# Patient Record
Sex: FEMALE | Race: WHITE | NOT HISPANIC OR LATINO | Employment: OTHER | URBAN - NONMETROPOLITAN AREA
[De-identification: names, ages, dates, MRNs, and addresses within clinical notes are randomized per-mention and may not be internally consistent; named-entity substitution may affect disease eponyms.]

---

## 2017-01-20 ENCOUNTER — TELEPHONE (OUTPATIENT)
Dept: GASTROENTEROLOGY | Facility: CLINIC | Age: 27
End: 2017-01-20

## 2017-01-20 DIAGNOSIS — Z83.71 FAMILY HISTORY OF POLYPS IN THE COLON: Primary | ICD-10-CM

## 2017-01-20 NOTE — TELEPHONE ENCOUNTER
Pt records from referring physician reviewed  According to medical record Corrine Conte does not have a history of heart disease or lung disease.  According to medical record Corrine Conte is not currently on blood thinner.  According to medical record Corrine Conte is not currently exhibiting abdominal pain, weight loss, hematochezia, melena,  change in bowels, or other symptoms to indicate pt would need to be seen in office.    Will submit order for Corrine Conte to proceed with CScope    Medication will be verified as well as a lack of GI related symptomology when pt is scheduled for procedure.

## 2017-02-17 RX ORDER — LEVONORGESTREL AND ETHINYL ESTRADIOL 0.1-0.02MG
KIT ORAL
Refills: 2 | COMMUNITY
Start: 2017-01-16 | End: 2017-02-22

## 2017-02-22 ENCOUNTER — OFFICE VISIT (OUTPATIENT)
Dept: GASTROENTEROLOGY | Facility: CLINIC | Age: 27
End: 2017-02-22

## 2017-02-22 VITALS
OXYGEN SATURATION: 99 % | DIASTOLIC BLOOD PRESSURE: 56 MMHG | SYSTOLIC BLOOD PRESSURE: 104 MMHG | HEIGHT: 68 IN | TEMPERATURE: 97.4 F | HEART RATE: 76 BPM | BODY MASS INDEX: 23.55 KG/M2 | WEIGHT: 155.4 LBS

## 2017-02-22 DIAGNOSIS — Z83.71 FAMILY HISTORY OF COLONIC POLYPS: Primary | ICD-10-CM

## 2017-02-22 PROCEDURE — S0260 H&P FOR SURGERY: HCPCS | Performed by: NURSE PRACTITIONER

## 2017-02-22 RX ORDER — LEVONORGESTREL AND ETHINYL ESTRADIOL 0.1-0.02MG
1 KIT ORAL DAILY
COMMUNITY
End: 2019-07-23 | Stop reason: HOSPADM

## 2017-02-22 NOTE — PROGRESS NOTES
Chief Complaint   Patient presents with   • Constipation     Patient states she has had problems with constipation in the past.  Her mother has a hx of colon polyps and her PCP wanted her checked to see if she needs a colonoscopy before the age of 30.     Subjective   HPI    Corrine Conte is a 26 y.o. female who presents to office for preventative maintenance.  There is not  a personal history of colon polyps.  There is not a history of colon cancer.  She does not have complaints of nausea/vomiting, change in bowels, weight loss, no BRBPR, no melena.  There is not a family history of colon cancer.  There is a family history of colon polyps-mother age of 19.  Pt last colonoscopy-no previous colonoscopy .  Bowels do move on regular basis.    Past Medical History   Diagnosis Date   • Family history of polyps in the colon      Past Surgical History   Procedure Laterality Date   • Tonsillectomy     • Leep       Outpatient Prescriptions Marked as Taking for the 2/22/17 encounter (Office Visit) with NAYAN Cornell   Medication Sig Dispense Refill   • levonorgestrel-ethinyl estradiol (SRONYX) 0.1-20 MG-MCG per tablet Take 1 tablet by mouth Daily.       Allergies   Allergen Reactions   • Diphenhydramine    • Sulfa Antibiotics      Social History     Social History   • Marital status: Single     Spouse name: N/A   • Number of children: N/A   • Years of education: N/A     Occupational History   • Not on file.     Social History Main Topics   • Smoking status: Never Smoker   • Smokeless tobacco: Never Used   • Alcohol use Yes      Comment: OCC   • Drug use: No   • Sexual activity: Defer      Comment:      Other Topics Concern   • Not on file     Social History Narrative     Family History   Problem Relation Age of Onset   • Colon polyps Mother 19     MULTIPLE POLYPS   • Hypertension Father    • Diabetes Father      PRE-DIABETES   • Obesity Father    • Hypertension Brother    • Breast cancer Maternal  Grandmother      POST-MENOPAUSE   • Heart disease Maternal Grandfather    • Diabetes Paternal Grandfather    • Heart disease Paternal Grandfather 56     BYPASS AND VALVE REPLACEMENT   • Colon cancer Neg Hx    • Rectal cancer Neg Hx    • Esophageal cancer Neg Hx    • Liver cancer Neg Hx    • Liver disease Neg Hx    • Stomach cancer Neg Hx      Review of Systems   Constitutional: Negative for fatigue, fever and unexpected weight change.   HENT: Negative for hearing loss, sore throat and voice change.    Eyes: Negative for visual disturbance.   Respiratory: Negative for cough, shortness of breath and wheezing.    Cardiovascular: Negative for chest pain and palpitations.   Gastrointestinal: Negative for abdominal pain, blood in stool and vomiting.   Endocrine: Negative for polydipsia and polyuria.   Genitourinary: Negative for difficulty urinating, dysuria, hematuria and urgency.   Musculoskeletal: Negative for joint swelling and myalgias.   Skin: Negative for color change, rash and wound.   Neurological: Negative for dizziness, tremors, seizures and syncope.   Hematological: Does not bruise/bleed easily.   Psychiatric/Behavioral: Negative for agitation and confusion. The patient is not nervous/anxious.      Objective   Vitals:    02/22/17 0813   BP: 104/56   Pulse: 76   Temp: 97.4 °F (36.3 °C)   SpO2: 99%     Physical Exam   Constitutional: She is oriented to person, place, and time. She appears well-developed and well-nourished.   HENT:   Head: Normocephalic and atraumatic.   Eyes: Conjunctivae are normal. Pupils are equal, round, and reactive to light. No scleral icterus.   Neck: No JVD present. No thyroid mass and no thyromegaly present.   Cardiovascular: Normal rate, regular rhythm and normal heart sounds.  Exam reveals no gallop and no friction rub.    No murmur heard.  Pulmonary/Chest: Effort normal and breath sounds normal. No accessory muscle usage. No respiratory distress. She has no wheezes. She has no rales.    Abdominal: Soft. Bowel sounds are normal. She exhibits no distension, no ascites and no mass. There is no splenomegaly or hepatomegaly. There is no tenderness. There is no rebound and no guarding.   Genitourinary:   Genitourinary Comments: Rectal-Did not examine   Musculoskeletal: Normal range of motion. She exhibits no edema.   Neurological: She is alert and oriented to person, place, and time.   Deemed a reliable historian, able to converse without difficulty and able to move all extremities without difficulty   Skin: Skin is warm and dry.   Psychiatric: She has a normal mood and affect. Her behavior is normal.     Imaging Results (most recent)     None        Assessment/Plan   Corrine was seen today for constipation.    Diagnoses and all orders for this visit:    Family history of colonic polyps  -     Case request  -     Sod Picosulfate-Mag Ox-Cit Acd (PREPOPIK) 10-3.5-12 MG-GM-GM pack; Take as directed      COLONOSCOPY WITH ANESTHESIA (N/A)      All risks, benefits, alternatives, and indications of colonoscopy procedure have been discussed with the patient. Risks to include perforation of the colon requiring possible surgery or colostomy, risk of bleeding from biopsies or removal of colon tissue, possibility of missing a colon polyp or cancer, or adverse drug reaction.  Benefits to include the diagnosis and management of disease of the colon and rectum. Alternatives to include barium enema, radiographic evaluation, lab testing or no intervention. Pt verbalizes understanding and agrees.     Current guidelines indicate first degree relative should undergo CScope at age 40 or 10 yrs prior to age of diagnosis, whichever is first.    There are no Patient Instructions on file for this visit.

## 2017-03-13 ENCOUNTER — ANESTHESIA EVENT (OUTPATIENT)
Dept: GASTROENTEROLOGY | Facility: HOSPITAL | Age: 27
End: 2017-03-13

## 2017-03-14 ENCOUNTER — ANESTHESIA (OUTPATIENT)
Dept: GASTROENTEROLOGY | Facility: HOSPITAL | Age: 27
End: 2017-03-14

## 2017-03-14 ENCOUNTER — HOSPITAL ENCOUNTER (OUTPATIENT)
Facility: HOSPITAL | Age: 27
Setting detail: HOSPITAL OUTPATIENT SURGERY
Discharge: HOME | End: 2017-03-14
Attending: INTERNAL MEDICINE | Admitting: INTERNAL MEDICINE

## 2017-03-14 ENCOUNTER — TELEPHONE (OUTPATIENT)
Dept: GASTROENTEROLOGY | Facility: CLINIC | Age: 27
End: 2017-03-14

## 2017-03-14 VITALS
TEMPERATURE: 98.1 F | WEIGHT: 150 LBS | HEIGHT: 68 IN | SYSTOLIC BLOOD PRESSURE: 107 MMHG | HEART RATE: 63 BPM | OXYGEN SATURATION: 100 % | BODY MASS INDEX: 22.73 KG/M2 | DIASTOLIC BLOOD PRESSURE: 63 MMHG | RESPIRATION RATE: 14 BRPM

## 2017-03-14 LAB — B-HCG UR QL: NEGATIVE

## 2017-03-14 PROCEDURE — 45378 DIAGNOSTIC COLONOSCOPY: CPT | Performed by: INTERNAL MEDICINE

## 2017-03-14 PROCEDURE — 25010000002 PROPOFOL 10 MG/ML EMULSION: Performed by: NURSE ANESTHETIST, CERTIFIED REGISTERED

## 2017-03-14 PROCEDURE — 81025 URINE PREGNANCY TEST: CPT | Performed by: NURSE ANESTHETIST, CERTIFIED REGISTERED

## 2017-03-14 RX ORDER — SODIUM CHLORIDE 0.9 % (FLUSH) 0.9 %
1-10 SYRINGE (ML) INJECTION AS NEEDED
Status: DISCONTINUED | OUTPATIENT
Start: 2017-03-14 | End: 2017-03-14 | Stop reason: HOSPADM

## 2017-03-14 RX ORDER — PROPOFOL 10 MG/ML
VIAL (ML) INTRAVENOUS AS NEEDED
Status: DISCONTINUED | OUTPATIENT
Start: 2017-03-14 | End: 2017-03-14 | Stop reason: SURG

## 2017-03-14 RX ORDER — LIDOCAINE HYDROCHLORIDE 20 MG/ML
INJECTION, SOLUTION INFILTRATION; PERINEURAL AS NEEDED
Status: DISCONTINUED | OUTPATIENT
Start: 2017-03-14 | End: 2017-03-14 | Stop reason: SURG

## 2017-03-14 RX ORDER — SODIUM CHLORIDE 9 MG/ML
100 INJECTION, SOLUTION INTRAVENOUS CONTINUOUS
Status: DISCONTINUED | OUTPATIENT
Start: 2017-03-14 | End: 2017-03-14 | Stop reason: HOSPADM

## 2017-03-14 RX ORDER — GLYCOPYRROLATE 0.2 MG/ML
INJECTION INTRAMUSCULAR; INTRAVENOUS AS NEEDED
Status: DISCONTINUED | OUTPATIENT
Start: 2017-03-14 | End: 2017-03-14 | Stop reason: SURG

## 2017-03-14 RX ADMIN — LIDOCAINE HYDROCHLORIDE 30 MG: 20 INJECTION, SOLUTION INFILTRATION; PERINEURAL at 09:57

## 2017-03-14 RX ADMIN — PROPOFOL 50 MG: 10 INJECTION, EMULSION INTRAVENOUS at 10:05

## 2017-03-14 RX ADMIN — PROPOFOL 100 MG: 10 INJECTION, EMULSION INTRAVENOUS at 09:57

## 2017-03-14 RX ADMIN — SODIUM CHLORIDE 100 ML/HR: 9 INJECTION, SOLUTION INTRAVENOUS at 09:35

## 2017-03-14 RX ADMIN — PROPOFOL 50 MG: 10 INJECTION, EMULSION INTRAVENOUS at 10:02

## 2017-03-14 RX ADMIN — GLYCOPYRROLATE 0.2 MG: 0.2 INJECTION, SOLUTION INTRAMUSCULAR; INTRAVENOUS at 09:55

## 2017-03-14 RX ADMIN — PROPOFOL 50 MG: 10 INJECTION, EMULSION INTRAVENOUS at 09:59

## 2017-03-14 NOTE — PLAN OF CARE
Problem: Patient Care Overview (Adult)  Goal: Plan of Care Review  Outcome: Outcome(s) achieved Date Met:  03/14/17 03/14/17 1019   Patient Care Overview   Progress progress toward functional goals as expected   Outcome Evaluation   Outcome Summary/Follow up Plan d/c criteria met   Coping/Psychosocial Response Interventions   Plan Of Care Reviewed With patient;family

## 2017-03-14 NOTE — PLAN OF CARE
Problem: GI Endoscopy (Adult)  Goal: Signs and Symptoms of Listed Potential Problems Will be Absent or Manageable (GI Endoscopy)  Outcome: Outcome(s) achieved Date Met:  03/14/17

## 2017-03-14 NOTE — PLAN OF CARE
Problem: Patient Care Overview (Adult)  Goal: Plan of Care Review  Outcome: Ongoing (interventions implemented as appropriate)    03/14/17 1007   Patient Care Overview   Progress improving   Outcome Evaluation   Outcome Summary/Follow up Plan pt tolerated procedure well          Problem: GI Endoscopy (Adult)  Goal: Signs and Symptoms of Listed Potential Problems Will be Absent or Manageable (GI Endoscopy)  Outcome: Ongoing (interventions implemented as appropriate)    03/14/17 1007   GI Endoscopy   Problems Assessed (GI Endoscopy) all   Problems Present (GI Endoscopy) none

## 2017-03-14 NOTE — ANESTHESIA PREPROCEDURE EVALUATION
Anesthesia Evaluation     Patient summary reviewed   history of anesthetic complications: PONV     Airway   Mallampati: I  TM distance: >3 FB  Neck ROM: full  no difficulty expected  Dental - normal exam     Pulmonary - negative pulmonary ROS   Cardiovascular - negative cardio ROS  Exercise tolerance: excellent (>7 METS)        Neuro/Psych- negative ROS  GI/Hepatic/Renal/Endo - negative ROS     Musculoskeletal (-) negative ROS    Abdominal    Substance History - negative use     OB/GYN          Other - negative ROS                                   Anesthesia Plan    ASA 1     general     intravenous induction   Anesthetic plan and risks discussed with patient.

## 2017-03-14 NOTE — ANESTHESIA POSTPROCEDURE EVALUATION
Patient: Corrine Conte    Procedure Summary     Date Anesthesia Start Anesthesia Stop Room / Location    03/14/17 0953 1008  [facility] /  [facility]       Procedure Diagnosis Surgeon Provider    COLONOSCOPY WITH ANESTHESIA (N/A ) Family history of colonic polyps  (Family history of colonic polyps [Z83.71]) DO Rick Barrientos CRNA          Anesthesia Type: general  Last vitals  /77 (03/14/17 0917)    Temp 98.1 °F (36.7 °C) (03/14/17 0917)    Pulse 79 (03/14/17 0917)   Resp 16 (03/14/17 0917)    SpO2 100 % (03/14/17 0917)      Post Anesthesia Care and Evaluation    Patient location during evaluation: PHASE II  Patient participation: complete - patient cannot participate  Level of consciousness: responsive to light touch  Pain score: 0  Pain management: adequate  Airway patency: patent  Anesthetic complications: No anesthetic complications  PONV Status: none  Cardiovascular status: acceptable  Respiratory status: acceptable  Hydration status: acceptable

## 2017-04-07 RX ORDER — LEVONORGESTREL AND ETHINYL ESTRADIOL 0.1-0.02MG
KIT ORAL
Qty: 28 TABLET | Refills: 0 | Status: SHIPPED | OUTPATIENT
Start: 2017-04-07 | End: 2017-04-30 | Stop reason: SDUPTHER

## 2017-05-01 RX ORDER — LEVONORGESTREL AND ETHINYL ESTRADIOL 0.1-0.02MG
KIT ORAL
Qty: 28 TABLET | Refills: 0 | Status: SHIPPED | OUTPATIENT
Start: 2017-05-01 | End: 2017-06-21 | Stop reason: SDUPTHER

## 2017-06-16 RX ORDER — LEVONORGESTREL AND ETHINYL ESTRADIOL 0.1-0.02MG
KIT ORAL
Qty: 28 TABLET | Refills: 0 | OUTPATIENT
Start: 2017-06-16

## 2017-06-21 RX ORDER — LEVONORGESTREL AND ETHINYL ESTRADIOL 0.1-0.02MG
KIT ORAL
Qty: 28 TABLET | Refills: 1 | Status: SHIPPED | OUTPATIENT
Start: 2017-06-21 | End: 2017-07-14 | Stop reason: SDUPTHER

## 2017-07-14 ENCOUNTER — OFFICE VISIT (OUTPATIENT)
Dept: OBGYN | Age: 27
End: 2017-07-14
Payer: COMMERCIAL

## 2017-07-14 VITALS
DIASTOLIC BLOOD PRESSURE: 76 MMHG | SYSTOLIC BLOOD PRESSURE: 117 MMHG | HEIGHT: 68 IN | BODY MASS INDEX: 22.73 KG/M2 | HEART RATE: 77 BPM | WEIGHT: 150 LBS

## 2017-07-14 DIAGNOSIS — Z12.4 CERVICAL CANCER SCREENING: Primary | ICD-10-CM

## 2017-07-14 DIAGNOSIS — B37.31 YEAST INFECTION OF THE VAGINA: ICD-10-CM

## 2017-07-14 PROCEDURE — 99395 PREV VISIT EST AGE 18-39: CPT | Performed by: NURSE PRACTITIONER

## 2017-07-14 RX ORDER — LEVONORGESTREL AND ETHINYL ESTRADIOL 0.1-0.02MG
KIT ORAL
Qty: 28 TABLET | Refills: 11 | Status: SHIPPED | OUTPATIENT
Start: 2017-07-14 | End: 2019-01-23

## 2017-07-14 RX ORDER — FLUCONAZOLE 100 MG/1
100 TABLET ORAL ONCE
Qty: 1 TABLET | Refills: 0 | Status: SHIPPED | OUTPATIENT
Start: 2017-07-14 | End: 2017-07-14

## 2017-07-14 ASSESSMENT — ENCOUNTER SYMPTOMS
APNEA: 0
CONSTIPATION: 0
NAUSEA: 0
WHEEZING: 0
SORE THROAT: 0
SHORTNESS OF BREATH: 0
ABDOMINAL PAIN: 0
TROUBLE SWALLOWING: 0
DIARRHEA: 0

## 2017-08-31 ENCOUNTER — OFFICE VISIT (OUTPATIENT)
Dept: OTOLARYNGOLOGY | Facility: CLINIC | Age: 27
End: 2017-08-31

## 2017-08-31 VITALS
TEMPERATURE: 98.1 F | BODY MASS INDEX: 22.07 KG/M2 | SYSTOLIC BLOOD PRESSURE: 118 MMHG | DIASTOLIC BLOOD PRESSURE: 88 MMHG | WEIGHT: 145.6 LBS | HEIGHT: 68 IN

## 2017-08-31 DIAGNOSIS — D48.9 NEOPLASM OF UNCERTAIN BEHAVIOR: Primary | ICD-10-CM

## 2017-08-31 PROCEDURE — 99203 OFFICE O/P NEW LOW 30 MIN: CPT | Performed by: OTOLARYNGOLOGY

## 2017-08-31 RX ORDER — FLUCONAZOLE 200 MG/1
200 TABLET ORAL
COMMUNITY
End: 2019-07-23 | Stop reason: HOSPADM

## 2017-09-06 ENCOUNTER — TELEPHONE (OUTPATIENT)
Dept: OTOLARYNGOLOGY | Facility: CLINIC | Age: 27
End: 2017-09-06

## 2017-09-06 NOTE — TELEPHONE ENCOUNTER
----- Message from Kasandra Warren RN sent at 9/5/2017  4:38 PM CDT -----  Call and schedule iop.    9/6/17 I contacted patient to schedule IOP. She will check her school schedule and work schedule and call me next week to schedule appt.

## 2019-01-23 ENCOUNTER — OFFICE VISIT (OUTPATIENT)
Dept: OBGYN | Age: 29
End: 2019-01-23
Payer: COMMERCIAL

## 2019-01-23 VITALS
HEIGHT: 67 IN | HEART RATE: 89 BPM | WEIGHT: 151 LBS | BODY MASS INDEX: 23.7 KG/M2 | TEMPERATURE: 98.2 F | SYSTOLIC BLOOD PRESSURE: 125 MMHG | DIASTOLIC BLOOD PRESSURE: 79 MMHG

## 2019-01-23 DIAGNOSIS — Z01.419 WELL FEMALE EXAM WITH ROUTINE GYNECOLOGICAL EXAM: Primary | ICD-10-CM

## 2019-01-23 DIAGNOSIS — Z31.9 INFERTILITY MANAGEMENT: ICD-10-CM

## 2019-01-23 DIAGNOSIS — Z12.4 SCREENING FOR CERVICAL CANCER: ICD-10-CM

## 2019-01-23 PROCEDURE — 99395 PREV VISIT EST AGE 18-39: CPT | Performed by: OBSTETRICS & GYNECOLOGY

## 2019-01-23 RX ORDER — NITROFURANTOIN 25; 75 MG/1; MG/1
CAPSULE ORAL
Refills: 0 | COMMUNITY
Start: 2019-01-21 | End: 2019-04-30 | Stop reason: ALTCHOICE

## 2019-01-23 ASSESSMENT — ENCOUNTER SYMPTOMS
EYES NEGATIVE: 1
GASTROINTESTINAL NEGATIVE: 1
RESPIRATORY NEGATIVE: 1

## 2019-02-18 ENCOUNTER — TELEPHONE (OUTPATIENT)
Dept: OBGYN | Age: 29
End: 2019-02-18

## 2019-03-11 ENCOUNTER — HOSPITAL ENCOUNTER (OUTPATIENT)
Dept: GENERAL RADIOLOGY | Age: 29
Discharge: HOME | End: 2019-03-11
Payer: COMMERCIAL

## 2019-03-11 DIAGNOSIS — Z31.9 INFERTILITY MANAGEMENT: ICD-10-CM

## 2019-03-11 PROCEDURE — 6360000004 HC RX CONTRAST MEDICATION: Performed by: OBSTETRICS & GYNECOLOGY

## 2019-03-11 PROCEDURE — 74740 X-RAY FEMALE GENITAL TRACT: CPT

## 2019-03-11 RX ADMIN — IOPAMIDOL 15 ML: 612 INJECTION, SOLUTION INTRATHECAL at 14:35

## 2019-03-22 ENCOUNTER — HOSPITAL ENCOUNTER (OUTPATIENT)
Dept: ULTRASOUND IMAGING | Age: 29
Discharge: HOME | End: 2019-03-22
Payer: COMMERCIAL

## 2019-03-22 DIAGNOSIS — Z31.9 INFERTILITY MANAGEMENT: ICD-10-CM

## 2019-03-22 PROCEDURE — 76830 TRANSVAGINAL US NON-OB: CPT

## 2019-04-08 ENCOUNTER — OFFICE VISIT (OUTPATIENT)
Dept: OBGYN | Age: 29
End: 2019-04-08
Payer: COMMERCIAL

## 2019-04-08 VITALS
SYSTOLIC BLOOD PRESSURE: 120 MMHG | WEIGHT: 150 LBS | HEART RATE: 81 BPM | DIASTOLIC BLOOD PRESSURE: 71 MMHG | HEIGHT: 67 IN | BODY MASS INDEX: 23.54 KG/M2

## 2019-04-08 DIAGNOSIS — N91.2 AMENORRHEA: ICD-10-CM

## 2019-04-08 DIAGNOSIS — N91.2 AMENORRHEA: Primary | ICD-10-CM

## 2019-04-08 LAB
CONTROL: ABNORMAL
GONADOTROPIN, CHORIONIC (HCG) QUANT: 8121 MIU/ML (ref 0–5.3)
PREGNANCY TEST URINE, POC: ABNORMAL
PROGESTERONE LEVEL: 24.35 NG/ML

## 2019-04-08 PROCEDURE — 81025 URINE PREGNANCY TEST: CPT | Performed by: NURSE PRACTITIONER

## 2019-04-08 PROCEDURE — 99213 OFFICE O/P EST LOW 20 MIN: CPT | Performed by: NURSE PRACTITIONER

## 2019-04-08 RX ORDER — AMOXICILLIN 500 MG
2 CAPSULE ORAL NIGHTLY
COMMUNITY

## 2019-04-08 RX ORDER — CHLORAL HYDRATE 500 MG
3000 CAPSULE ORAL 3 TIMES DAILY
COMMUNITY
End: 2019-04-30 | Stop reason: ALTCHOICE

## 2019-04-08 ASSESSMENT — ENCOUNTER SYMPTOMS
EYES NEGATIVE: 1
RESPIRATORY NEGATIVE: 1
GASTROINTESTINAL NEGATIVE: 1
ALLERGIC/IMMUNOLOGIC NEGATIVE: 1
DIARRHEA: 0
CONSTIPATION: 0

## 2019-04-08 NOTE — PROGRESS NOTES
Nika Harris is a 33 y.o. female who presents today for her medical conditions/ complaints as noted below. Nika Harris is c/o of Confirmation        HPI  Pt presents with +UPT at home and in office. Has been TTC x1 year. Had HSG last month, very excited. Taking PNV and DHA. Current on pap and exam. Sure about LMP, approx 5-6 weeks today. Patient's last menstrual period was 2019 (exact date).     Past Medical History:   Diagnosis Date    Dysplasia of cervix      Past Surgical History:   Procedure Laterality Date    BREAST ENHANCEMENT SURGERY  2019    LEEP      TONSILLECTOMY AND ADENOIDECTOMY       Family History   Problem Relation Age of Onset    High Blood Pressure Father     Diabetes Paternal Grandfather     Heart Disease Paternal Grandfather      Social History     Tobacco Use    Smoking status: Never Smoker    Smokeless tobacco: Never Used   Substance Use Topics    Alcohol use: No       Current Outpatient Medications   Medication Sig Dispense Refill    Omega-3 Fatty Acids (FISH OIL) 1200 MG CAPS Take 2 capsules by mouth nightly Indications: pt takes 2pills nightly to equal the 1200mg      Prenatal Vit-Fe Fumarate-FA (PRENATAL VITAMIN PO) Take by mouth      MELATONIN PO Take by mouth      Omega-3 Fatty Acids (FISH OIL) 1000 MG CAPS Take 3,000 mg by mouth 3 times daily      nitrofurantoin, macrocrystal-monohydrate, (MACROBID) 100 MG capsule TK 1 C PO Q 12 H WITH FOOD  0     No current facility-administered medications for this visit. Allergies   Allergen Reactions    Benadryl [Diphenhydramine]     Sulfa Antibiotics      Vitals:    19 1446   BP: 120/71   Pulse: 81     Body mass index is 23.49 kg/m². Review of Systems   Constitutional: Negative. HENT: Negative. Eyes: Negative. Respiratory: Negative. Cardiovascular: Negative. Gastrointestinal: Negative. Negative for constipation and diarrhea. Endocrine: Negative.     Genitourinary: Positive for menstrual problem (missed period x1). Negative for frequency and urgency. Musculoskeletal: Negative. Skin: Negative. Allergic/Immunologic: Negative. Neurological: Negative. Hematological: Negative. Psychiatric/Behavioral: Negative. All other systems reviewed and are negative. Physical Exam   Constitutional: She is oriented to person, place, and time. She appears well-developed and well-nourished. Musculoskeletal: Normal range of motion. Neurological: She is alert and oriented to person, place, and time. Skin: Skin is warm and dry. Psychiatric: She has a normal mood and affect. Nursing note and vitals reviewed. Diagnosis Orders   1. Amenorrhea  US OB Transvaginal    POCT urine pregnancy    HCG, Quantitative, Pregnancy    Progesterone       MEDICATIONS:  No orders of the defined types were placed in this encounter. ORDERS:  Orders Placed This Encounter   Procedures    US OB Transvaginal    HCG, Quantitative, Pregnancy    Progesterone    POCT urine pregnancy       PLAN:  NOB visit, viability u/s and labs in 2 weeks  Gave handouts  Checking quant and prog today    Patient Instructions   We are committed to providing you with the best care possible. In order to help us achieve these goals please remember to bring all medications, herbal products, and over the counter supplements with you to each visit. If your provider has ordered testing for you, please be sure to follow up with our office if you have not received results within 7 days after testing took place. *If you receive a survey after visiting one of our offices, please take the time to share your experience concerning your physician office visit. These surveys are confidential and no health information about you is shared. We are eager to improve for you and we are counting on your feedback to help make that happen.

## 2019-04-30 ENCOUNTER — INITIAL PRENATAL (OUTPATIENT)
Dept: OBGYN | Age: 29
End: 2019-04-30

## 2019-04-30 ENCOUNTER — TELEPHONE (OUTPATIENT)
Dept: OBGYN | Age: 29
End: 2019-04-30

## 2019-04-30 ENCOUNTER — HOSPITAL ENCOUNTER (OUTPATIENT)
Dept: ULTRASOUND IMAGING | Age: 29
Discharge: HOME | End: 2019-04-30
Payer: COMMERCIAL

## 2019-04-30 VITALS
SYSTOLIC BLOOD PRESSURE: 114 MMHG | DIASTOLIC BLOOD PRESSURE: 68 MMHG | WEIGHT: 150 LBS | HEART RATE: 86 BPM | BODY MASS INDEX: 23.49 KG/M2

## 2019-04-30 DIAGNOSIS — O34.41 HISTORY OF LOOP ELECTROSURGICAL EXCISION PROCEDURE (LEEP) OF CERVIX AFFECTING PREGNANCY IN FIRST TRIMESTER: ICD-10-CM

## 2019-04-30 DIAGNOSIS — N91.2 AMENORRHEA: ICD-10-CM

## 2019-04-30 DIAGNOSIS — Z3A.08 8 WEEKS GESTATION OF PREGNANCY: ICD-10-CM

## 2019-04-30 DIAGNOSIS — Z98.890 HISTORY OF LOOP ELECTROSURGICAL EXCISION PROCEDURE (LEEP) OF CERVIX AFFECTING PREGNANCY IN FIRST TRIMESTER: ICD-10-CM

## 2019-04-30 DIAGNOSIS — O30.031 MONOCHORIONIC DIAMNIOTIC TWIN GESTATION IN FIRST TRIMESTER: Primary | ICD-10-CM

## 2019-04-30 DIAGNOSIS — O30.031 MONOCHORIONIC DIAMNIOTIC TWIN GESTATION IN FIRST TRIMESTER: ICD-10-CM

## 2019-04-30 LAB
ABO/RH: NORMAL
ANTIBODY SCREEN: NORMAL

## 2019-04-30 PROCEDURE — 76817 TRANSVAGINAL US OBSTETRIC: CPT

## 2019-04-30 PROCEDURE — 0500F INITIAL PRENATAL CARE VISIT: CPT | Performed by: NURSE PRACTITIONER

## 2019-04-30 RX ORDER — UREA 10 %
800 LOTION (ML) TOPICAL DAILY
Qty: 30 TABLET | Refills: 11 | Status: SHIPPED | OUTPATIENT
Start: 2019-04-30

## 2019-04-30 RX ORDER — ONDANSETRON 4 MG/1
4 TABLET, ORALLY DISINTEGRATING ORAL EVERY 8 HOURS PRN
Qty: 30 TABLET | Refills: 1 | Status: SHIPPED | OUTPATIENT
Start: 2019-04-30 | End: 2019-07-02

## 2019-04-30 NOTE — TELEPHONE ENCOUNTER
Appt with [facility] is for 5/6/2019 at 11:30. Faxed records. Left message for pt to call back.  Elizabeth

## 2019-04-30 NOTE — PROGRESS NOTES
Pt presents today for routine prenatal visit. Pt denies vaginal bleeding,  or leaking of fluid. Pt states has had some cramping thinks might be constipation.

## 2019-04-30 NOTE — PROGRESS NOTES
Cecilio Ramirez is here for a return obstetrical visit. Today she is 8w4d weeks EGA. She is doing well and has no complaints. She  does not have vaginal bleeding, leaking of fluid, contractions. She does not have blurred vision, SOB, or increased swelling in legs or face. Pt does not feel fetal movement regularly. Pt found out today she is having TWINS! Mono/Di, will need MFM referral. Getting Nob labs today. Has also had LEEP. Plan of care was discussed with patient. Patient was encouraged to adhere to a well-balanced diet, including increasing water intake and limiting excessive caffeine and salt. The benefits of exercise were discussed; however she was advised against heavy lifting, sit-ups and abdominal crunches. A list of safe OTC medications was provided and discussed. The patient was cautioned against the use of tanning beds, hot tubs, saunas, and x-rays. Avoidance of tobacco, alcohol and illicit drugs was also discussed due to harmful effects on the fetus and increased risks associated with pregnancy. Certain labs and ultrasounds are required at certain times during pregnancy but others are optional, including the serum integrated screen/Standish/AFP/ Panorama, and other genetic testing. The patient was encouraged to attend childbirth classes and general hospital information was provided based on patients hospital of choice. Objective: Mother's Prenatal Vitals  BP: 114/68  Weight: 150 lb (68 kg)  Pulse: 86  Alb/Glu  Albumin: Negative  Glucose: Negative  Prenatal Fetal Information  Fetal Heart Rate: 167  Movement: Absent  Prenatal Fetal Information B  Fetal Heart Rate B: 170  Pt is A&Ox3, in no acute distress. Normocephalic, atraumatic. PERRL. Resp even and non-labored. Skin pink, warm & dry. Gravid abdomen. PARTIDA's well. Gait steady. Assessment:  IUP at 8w4d wks      Diagnosis Orders   1.  Monochorionic diamniotic twin gestation in first trimester  HIV Obstetric Panel    Urine Culture    C.trachomatis N.gonorrhoeae DNA    Varicella Zoster Antibody, IgG    External Referral To Maternal Fetal Medicine   2. 8 weeks gestation of pregnancy     3. History of loop electrosurgical excision procedure (LEEP) of cervix affecting pregnancy in first trimester  External Referral To Maternal Fetal Medicine     Plan:Pt counseled on Genetic testing  Continue with routine prenatal care.   RTC in 4 wk for prenatal visit  MFM referral  Starting extra folic acid    MEDICATIONS:  Orders Placed This Encounter   Medications    folic acid (FOLVITE) 358 MCG tablet     Sig: Take 1 tablet by mouth daily     Dispense:  30 tablet     Refill:  11    ondansetron (ZOFRAN ODT) 4 MG disintegrating tablet     Sig: Take 1 tablet by mouth every 8 hours as needed for Nausea or Vomiting     Dispense:  30 tablet     Refill:  1       ORDERS:  Orders Placed This Encounter   Procedures    Urine Culture    C.trachomatis N.gonorrhoeae DNA    HIV Obstetric Panel    Varicella Zoster Antibody, IgG    External Referral To Maternal Fetal Medicine

## 2019-05-02 LAB
BASOPHILS ABSOLUTE: 0 K/UL (ref 0–0.2)
BASOPHILS RELATIVE PERCENT: 0.1 % (ref 0–1)
EOSINOPHILS ABSOLUTE: 0.2 K/UL (ref 0–0.6)
EOSINOPHILS RELATIVE PERCENT: 2.2 % (ref 0–5)
HCT VFR BLD CALC: 38.4 % (ref 37–47)
HEMOGLOBIN: 12.8 G/DL (ref 12–16)
HEPATITIS B SURFACE ANTIGEN INTERPRETATION: ABNORMAL
HIV 1,2 COMBO ANTIGEN/ANTIBODY: ABNORMAL
LYMPHOCYTES ABSOLUTE: 1.5 K/UL (ref 1.1–4.5)
LYMPHOCYTES RELATIVE PERCENT: 20.8 % (ref 20–40)
MCH RBC QN AUTO: 32.3 PG (ref 27–31)
MCHC RBC AUTO-ENTMCNC: 33.3 G/DL (ref 33–37)
MCV RBC AUTO: 97 FL (ref 81–99)
MONOCYTES ABSOLUTE: 0.7 K/UL (ref 0–0.9)
MONOCYTES RELATIVE PERCENT: 10.2 % (ref 0–10)
NEUTROPHILS ABSOLUTE: 4.8 K/UL (ref 1.5–7.5)
NEUTROPHILS RELATIVE PERCENT: 66.4 % (ref 50–65)
PDW BLD-RTO: 12.7 % (ref 11.5–14.5)
PLATELET # BLD: 136 K/UL (ref 130–400)
PMV BLD AUTO: 11.2 FL (ref 9.4–12.3)
RBC # BLD: 3.96 M/UL (ref 4.2–5.4)
RPR: ABNORMAL
RUBELLA ANTIBODY IGG: REACTIVE
URINE CULTURE, ROUTINE: NORMAL
WBC # BLD: 7.3 K/UL (ref 4.8–10.8)

## 2019-05-03 LAB
APTIMA MEDIA TYPE: NORMAL
CHLAMYDIA TRACHOMATIS AMPLIFIED DET: NEGATIVE
N GONORRHOEAE AMPLIFIED DET: NEGATIVE
SPECIMEN SOURCE: NORMAL

## 2019-05-06 LAB — VZV IGG SER QL IA: 2.91

## 2019-05-15 ENCOUNTER — ROUTINE PRENATAL (OUTPATIENT)
Dept: OBGYN | Age: 29
End: 2019-05-15

## 2019-05-15 VITALS
BODY MASS INDEX: 23.81 KG/M2 | DIASTOLIC BLOOD PRESSURE: 76 MMHG | SYSTOLIC BLOOD PRESSURE: 114 MMHG | HEART RATE: 80 BPM | WEIGHT: 152 LBS

## 2019-05-15 DIAGNOSIS — O30.031 MONOCHORIONIC DIAMNIOTIC TWIN GESTATION IN FIRST TRIMESTER: Primary | ICD-10-CM

## 2019-05-15 PROCEDURE — 0502F SUBSEQUENT PRENATAL CARE: CPT | Performed by: OBSTETRICS & GYNECOLOGY

## 2019-05-15 NOTE — PATIENT INSTRUCTIONS
Patient Education        Weeks 10 to 14 of Your Pregnancy: Care Instructions  Your Care Instructions    By weeks 10 to 14 of your pregnancy, the placenta has formed inside your uterus. It is possible to hear your baby's heartbeat with a special ultrasound device. Your baby's eyes can and do move. The arms and legs can bend. This is a good time to think about testing for birth defects. There are two types of tests: screening and diagnostic. Screening tests show the chance that a baby has a certain birth defect. They can't tell you for sure that your baby has a problem. Diagnostic tests show if a baby has a certain birth defect. It's your choice whether to have these tests. You and your partner can talk to your doctor or midwife about birth defects tests. Follow-up care is a key part of your treatment and safety. Be sure to make and go to all appointments, and call your doctor if you are having problems. It's also a good idea to know your test results and keep a list of the medicines you take. How can you care for yourself at home? Decide about tests  · You can have screening tests and diagnostic tests to check for birth defects. The decision to have a test for birth defects is personal. Think about your age, your chance of passing on a family disease, your need to know about any problems, and what you might do after you have the test results. ? Triple or quadruple (quad) blood tests. These screening tests can be done between 15 and 20 weeks of pregnancy. They check the amounts of three or four substances in your blood. The doctor looks at these test results, along with your age and other factors, to find out the chance that your baby may have certain problems. ? Amniocentesis. This diagnostic test is used to look for chromosomal problems in the baby's cells.  It can be done between 15 and 20 weeks of pregnancy, usually around week 16.  ? Nuchal translucency test. This test uses ultrasound to measure the thickness of the area at the back of the baby's neck. An increase in the thickness can be an early sign of Down syndrome. ? Chorionic villus sampling (CVS). This is a test that looks for certain genetic problems with your baby. The same genes that are in your baby are in the placenta. A small piece of the placenta is taken out and tested. This test is done when you are 10 to 13 weeks pregnant. Ease discomfort  · Slow down and take naps when you feel tired. · If your emotions swing, talk to someone. Crying, anxiety, and concentration problems are common. · If your gums bleed, try a softer toothbrush. If your gums are puffy and bleed a lot, see your dentist.  · If you feel dizzy:  ? Get up slowly after sitting or lying down. ? Drink plenty of fluids. ? Eat small snacks to keep your blood sugar stable. ? Put your head between your legs as though you were tying your shoelaces. ? Lie down with your legs higher than your head. Use pillows to prop up your feet. · If you have a headache:  ? Lie down. ? Ask your partner or a good friend for a neck massage. ? Try cool cloths over your forehead or across the back of your neck. ? Use acetaminophen (Tylenol) for pain relief. Do not use nonsteroidal anti-inflammatory drugs (NSAIDs), such as ibuprofen (Advil, Motrin) or naproxen (Aleve), unless your doctor says it is okay. · If you have a nosebleed, pinch your nose gently, and hold it for a short while. To prevent nosebleeds, try massaging a small dab of petroleum jelly, such as Vaseline, in your nostrils. · If your nose is stuffed up, try saline (saltwater) nose sprays. Do not use decongestant sprays. Care for your breasts  · Wear a bra that gives you good support. · Know that changes in your breasts are normal.  ? Your breasts may get larger and more tender. Tenderness usually gets better by 12 weeks. ? Your nipples may get darker and larger, and small bumps around your nipples may show more. ?  The veins in your chest and breasts may show more. · Don't worry about \"toughening'\" your nipples. Breastfeeding will naturally do this. Where can you learn more? Go to https://chpepiceweb.[email or web address] org and sign in to your [employer] account. Enter B916 in the Digital Chocolate box to learn more about \"Weeks 10 to 14 of Your Pregnancy: Care Instructions. \"     If you do not have an account, please click on the \"Sign Up Now\" link. Current as of: September 5, 2018  Content Version: 12.0  © 0322-4789 [employer]. Care instructions adapted under license by [facility] [place] ([facility]). If you have questions about a medical condition or this instruction, always ask your healthcare professional. [address] any warranty or liability for your use of this information.

## 2019-05-15 NOTE — PROGRESS NOTES
Subjective:  Agata Freedman is here for a return obstetrical visit. Today she is 10w5d weeks EGA. She is having nausea and vomiting. She  does not have vaginal bleeding, dizziness, or cramping. Objective: Mother's Prenatal Vitals  BP: 114/76  Weight: 152 lb (68.9 kg)  Pulse: 80  Patient Position: Sitting  Alb/Glu  Albumin: Negative  Glucose: Negative  Prenatal Fetal Information  Fetal Heart Rate: 158/US  Movement: Absent  Prenatal Fetal Information B  Fetal Heart Rate B: 163/US  Movement B: Absent  Pt is A&Ox3, in no acute distress. Normocephalic, atraumatic. PERRL. Resp even and non-labored. Skin pink, warm & dry. Gravid abdomen. PARTIDA's well. Gait steady. Assessment:    IUP at 10w5d wks      Diagnosis Orders   1. Monochorionic diamniotic twin gestation in first trimester       Plan:  Pt counseled on SAB  Pt will continue to see MFM  Continue with routine prenatal care. Bonjesta samples given to pt and rx sent in.   RTC in 4 wks for prenatal visit  MEDICATIONS:  Orders Placed This Encounter   Medications    Doxylamine-Pyridoxine ER 20-20 MG TBCR     Sig: Take 1 tablet by mouth at bedtime. May take 1 tablet in am if needed     Dispense:  60 tablet     Refill:  5     ORDERS:  No orders of the defined types were placed in this encounter.

## 2019-05-15 NOTE — PROGRESS NOTES
Pt presents today for routine prenatal visit. Pt denies vaginal bleeding,  or leaking of fluid. Pt states has had some cramping. Pt states her INS will not cover zofran would like something different.

## 2019-05-15 NOTE — PROGRESS NOTES
Pt presents today for a routine prenatal visit. Pt denies vaginal bleeding, leaking of fluid or cramping.

## 2019-06-03 ENCOUNTER — TRANSCRIBE ORDERS (OUTPATIENT)
Dept: LAB | Facility: HOSPITAL | Age: 29
End: 2019-06-03

## 2019-07-02 ENCOUNTER — ROUTINE PRENATAL (OUTPATIENT)
Dept: OBGYN | Age: 29
End: 2019-07-02

## 2019-07-02 VITALS
SYSTOLIC BLOOD PRESSURE: 117 MMHG | HEART RATE: 81 BPM | DIASTOLIC BLOOD PRESSURE: 76 MMHG | BODY MASS INDEX: 25.69 KG/M2 | WEIGHT: 164 LBS

## 2019-07-02 DIAGNOSIS — O09.92 HIGH-RISK PREGNANCY IN SECOND TRIMESTER: Primary | ICD-10-CM

## 2019-07-02 DIAGNOSIS — O34.42 HISTORY OF LOOP ELECTROSURGICAL EXCISION PROCEDURE (LEEP) OF CERVIX AFFECTING PREGNANCY IN SECOND TRIMESTER: ICD-10-CM

## 2019-07-02 DIAGNOSIS — Z98.890 HISTORY OF LOOP ELECTROSURGICAL EXCISION PROCEDURE (LEEP) OF CERVIX AFFECTING PREGNANCY IN SECOND TRIMESTER: ICD-10-CM

## 2019-07-02 DIAGNOSIS — O30.032 MONOCHORIONIC DIAMNIOTIC TWIN GESTATION IN SECOND TRIMESTER: ICD-10-CM

## 2019-07-02 PROCEDURE — 0502F SUBSEQUENT PRENATAL CARE: CPT | Performed by: NURSE PRACTITIONER

## 2019-07-19 ENCOUNTER — ROUTINE PRENATAL (OUTPATIENT)
Dept: OBGYN | Age: 29
End: 2019-07-19

## 2019-07-19 VITALS
SYSTOLIC BLOOD PRESSURE: 127 MMHG | DIASTOLIC BLOOD PRESSURE: 73 MMHG | WEIGHT: 171 LBS | BODY MASS INDEX: 26.78 KG/M2 | HEART RATE: 88 BPM

## 2019-07-19 DIAGNOSIS — O30.032 MONOCHORIONIC DIAMNIOTIC TWIN GESTATION IN SECOND TRIMESTER: Primary | ICD-10-CM

## 2019-07-19 PROCEDURE — 0502F SUBSEQUENT PRENATAL CARE: CPT | Performed by: OBSTETRICS & GYNECOLOGY

## 2019-07-19 NOTE — PROGRESS NOTES
Pt presents today for routine prenatal visit. Pt denies vaginal bleeding, cramping, or leaking of fluid +fetal movement.

## 2019-07-19 NOTE — PATIENT INSTRUCTIONS
to help with muscle ache. Prevent leg cramps  · Be sure to get enough calcium. If you are worried that you are not getting enough, talk to your doctor. · Exercise every day, and stretch your legs before bed. · Take a warm bath before bed, and try leg warmers at night. Where can you learn more? Go to [email or web address]://chpemagdaleneewnata.[email or web address]. org and sign in to your [employer] account. Enter H784 in the [employer] box to learn more about \"Weeks 18 to 22 of Your Pregnancy: Care Instructions. \"     If you do not have an account, please click on the \"Sign Up Now\" link. Current as of: September 5, 2018  Content Version: 12.0  © 4819-4930 Healthwise, Incorporated. Care instructions adapted under license by [facility] ([facility]). If you have questions about a medical condition or this instruction, always ask your healthcare professional. [address] any warranty or liability for your use of this information.

## 2019-07-22 ENCOUNTER — HOSPITAL ENCOUNTER (OUTPATIENT)
Facility: HOSPITAL | Age: 29
Setting detail: OBSERVATION
Discharge: HOME | End: 2019-07-23
Attending: OBSTETRICS & GYNECOLOGY | Admitting: OBSTETRICS & GYNECOLOGY

## 2019-07-22 PROBLEM — Z34.90 SUPERVISION OF NORMAL PREGNANCY: Status: ACTIVE | Noted: 2019-07-22

## 2019-07-22 PROCEDURE — G0378 HOSPITAL OBSERVATION PER HR: HCPCS

## 2019-07-22 PROCEDURE — 96360 HYDRATION IV INFUSION INIT: CPT

## 2019-07-22 RX ORDER — ASPIRIN 81 MG/1
81 TABLET, CHEWABLE ORAL DAILY
COMMUNITY
End: 2019-08-19

## 2019-07-22 RX ORDER — SODIUM CHLORIDE, SODIUM LACTATE, POTASSIUM CHLORIDE, CALCIUM CHLORIDE 600; 310; 30; 20 MG/100ML; MG/100ML; MG/100ML; MG/100ML
100 INJECTION, SOLUTION INTRAVENOUS CONTINUOUS
Status: DISCONTINUED | OUTPATIENT
Start: 2019-07-22 | End: 2019-07-23 | Stop reason: HOSPADM

## 2019-07-22 RX ORDER — FOLIC ACID 1 MG/1
1 TABLET ORAL DAILY
COMMUNITY
End: 2019-08-19

## 2019-07-22 RX ORDER — SODIUM CHLORIDE, SODIUM LACTATE, POTASSIUM CHLORIDE, CALCIUM CHLORIDE 600; 310; 30; 20 MG/100ML; MG/100ML; MG/100ML; MG/100ML
999 INJECTION, SOLUTION INTRAVENOUS CONTINUOUS
Status: DISCONTINUED | OUTPATIENT
Start: 2019-07-22 | End: 2019-07-23 | Stop reason: HOSPADM

## 2019-07-22 RX ADMIN — SODIUM CHLORIDE, POTASSIUM CHLORIDE, SODIUM LACTATE AND CALCIUM CHLORIDE 100 ML/HR: 600; 310; 30; 20 INJECTION, SOLUTION INTRAVENOUS at 20:27

## 2019-07-22 RX ADMIN — SODIUM CHLORIDE, POTASSIUM CHLORIDE, SODIUM LACTATE AND CALCIUM CHLORIDE 999 ML/HR: 600; 310; 30; 20 INJECTION, SOLUTION INTRAVENOUS at 17:31

## 2019-07-22 RX ADMIN — SODIUM CHLORIDE, POTASSIUM CHLORIDE, SODIUM LACTATE AND CALCIUM CHLORIDE 999 ML/HR: 600; 310; 30; 20 INJECTION, SOLUTION INTRAVENOUS at 19:08

## 2019-07-22 NOTE — NURSING NOTE
Spoke with Dr. Darby by phone.  Reported that pt is having contractions but does not feel them.  Orders received.

## 2019-07-23 VITALS
BODY MASS INDEX: 26.68 KG/M2 | RESPIRATION RATE: 16 BRPM | TEMPERATURE: 98.5 F | HEART RATE: 75 BPM | WEIGHT: 170 LBS | SYSTOLIC BLOOD PRESSURE: 115 MMHG | OXYGEN SATURATION: 99 % | HEIGHT: 67 IN | DIASTOLIC BLOOD PRESSURE: 67 MMHG

## 2019-07-23 PROBLEM — D48.9 NEOPLASM OF UNCERTAIN BEHAVIOR: Status: RESOLVED | Noted: 2017-08-31 | Resolved: 2019-07-23

## 2019-07-23 PROBLEM — Z34.90 SUPERVISION OF NORMAL PREGNANCY: Status: RESOLVED | Noted: 2019-07-22 | Resolved: 2019-07-23

## 2019-07-23 PROCEDURE — G0378 HOSPITAL OBSERVATION PER HR: HCPCS

## 2019-07-23 NOTE — H&P
"[facility]   Corrine Conte  : 1990  MRN: [id number]  CSN: [id number]    History and Physical    Subjective   Corrine Conte is a 29 y.o.  who presented to L&D due to cramping. Noted to have uterine irritability. P had an u/s this afternoon and showed some funneling.  Pt reports good  Mov, known Mono/Di twins.  Pt had  A leep procedure in the past.    Past Medical History:   Diagnosis Date   • Family history of polyps in the colon    • PONV (postoperative nausea and vomiting)      Past Surgical History:   Procedure Laterality Date   • ADENOIDECTOMY     • COLONOSCOPY N/A 3/14/2017    Procedure: COLONOSCOPY WITH ANESTHESIA;  Surgeon: Tony Farias DO;  Location: [facility] ENDOSCOPY;  Service:    • LEEP     • TONSILLECTOMY       Social History    Tobacco Use      Smoking status: Never Smoker      Smokeless tobacco: Never Used      Current Facility-Administered Medications:   •  lactated ringers infusion, 999 mL/hr, Intravenous, Continuous, Kobe Darby MD, Last Rate: 999 mL/hr at 19, 999 mL/hr at 19  •  lactated ringers infusion, 999 mL/hr, Intravenous, Continuous, Kobe Darby MD, Stopped at 19  •  lactated ringers infusion, 100 mL/hr, Intravenous, Continuous, Kobe Darby MD, Last Rate: 100 mL/hr at 19, 100 mL/hr at 19    Allergies   Allergen Reactions   • Diphenhydramine Rash   • Sulfa Antibiotics Rash       Review of Systems      Objective   /67   Pulse 75   Temp 98.5 °F (36.9 °C) (Temporal)   Resp 16   Ht 170.2 cm (67\")   Wt 77.1 kg (170 lb)   SpO2 99%   Breastfeeding? Yes   BMI 26.63 kg/m²   General: well developed; well nourished  no acute distress   Heart: regular rate and rhythm, S1, S2 normal, no murmur, click, rub or gallop   Lungs: breathing is unlabored   Abdomen: soft, non-tender; no masses  no umbilical or inguinal hernias are present  no hepato-splenomegaly   Pelvis:: Clinical staff was present for " exam, cervix long closed soft   Labs  Lab Results   Component Value Date     06/02/2016    HGB 13.2 06/02/2016    HCT 38.2 06/02/2016    WBC 5.48 06/02/2016        Assessment   1. Cramping  2. Mono/Di twins  3. Hx of leep procedure    The risks, benefits, and alternatives of the procedure; along with the risks of anesthesia was discussed in full with the patient and family and all questions were answered.       Plan   1. Admit to labor and delivery for observation, iv fluids if contractions become significant.    Kobe Darby MD  7/23/2019  12:09 PM

## 2019-07-23 NOTE — NURSING NOTE
Dr. Rogers at bedside.  Pessary placed without diff. POC reviewed.  No additional questions from pt.

## 2019-07-23 NOTE — PROGRESS NOTES
Pt reports feeling well. Pt was seen by carlos Rodríguez consult pending. Plan  Discharge home on  progesterone

## 2019-07-23 NOTE — CONSULTS
MFM Consult    Corrine is well known to me  G1 with MC/DA twins at 20 2/7 weeks    Multiple issues with this pregnancy  Initially some concern for an enlarged NT of one of the twins.   Negative ffDNA screen and this issue resolved.    Over the last few weeks, more concerned for asymmetry of the growth and fluid. Suspicion for TTTS.     On US yesterday (see AS report), both fetuses are growing appropriately, 14% discordance and fluid volumes were appropriate for each (>3cm), however her cervix found to have significant funneling and cervix 1cm dilated    Admitted last night for uterine monitoring  Having occ contractions on the monitor. Not felt by Corrine.   Consistent with twins. No clinical suspicion for infection    Long discussion with the couple regarding the premature cervical dilation with twins and the concern for a , possibly raysa-viable delivery.     We discussed different clinical options  With her having twins, cervical cerclage is not considered a reasonable clinical option  We discussed observation versus vaginal progesterone plus pessary.     We broadly discussed M&M characteristics for early delivery. At this gestational age, recommend management, with bed rest, at home. Couple also meeting with Dr Ordoñez (appreciated) from [facility].   We need to gain weeks for this pregnancy. Will form a plan in the coming weeks on when we will change from outpatient to inpatient management.   Multiple questions answered for the couple.     Recommend  Bed rest until delivery  Vaginal progesterone nightly, script sent in.   Will stop work--will provide letter  A #4 pessary was placed without complication  Will have return to my office for reassessment of fluid, cervix and further discussion.   S/sx of PTL reviewed. To call if changes/concerns.     All questions answered. Time on floor/unit for chart review, patient evaluation, discussion and coordination of care was >75 minutes with >50% in face to face counseling  and coordination of care on the issues as listed above.

## 2019-07-23 NOTE — CONSULTS
Prenatal Consult    Referring OB:  Dr. Rogers  Active Problems:    * No active hospital problems. *      Reason for Consultation: potential premature twins at 20 weeks gestation    Maternal History:   First last name is a 29 y.o. yr/o  with: history of LEEP procedure.  The  EDC is  Estimated Date of Delivery: Estimated Date of Delivery: 19    Consult:  mother available for discussion.  We reviewed the fetal and  aspects of the above conditions to include: estimated survival rate, estimated intact survival rate, respiratory distress syndrome, transient tachypnea of the , beneficial effects of steroids, early onset of sepsis, intraventricular hemorrhage, cerebral palsy, mental retardation, retinopathy of prematurity, deafness, chronic lung disease, patent ductus arteriosus, necrotizing enterocolitis, late onset sepsis, apnea of prematurity, anemia of prematurity, feeding difficulty, temperature instability, jaundice and hypoglycemia    Estimated length of stay: ADELE  Discussed the importance of providing human milk for pre-term and late pre-term infants: yes  Reviewed policies and procedures for NICU/ICN  Reviewed structure and function of ULP-Neonatology     We will plan to attend delivery when requested.    Plan for  resuscitation: full resuscitation    Additional comments: None    Thank you for allowing us to participate in the care of your patient.     Kenji Ordoñez MD  19  1:16 PM      40 minutes were spent in consultation, greater than 70% face to face time.

## 2019-08-02 ENCOUNTER — APPOINTMENT (OUTPATIENT)
Dept: ULTRASOUND IMAGING | Facility: HOSPITAL | Age: 29
End: 2019-08-02

## 2019-08-02 ENCOUNTER — HOSPITAL ENCOUNTER (OUTPATIENT)
Facility: HOSPITAL | Age: 29
Setting detail: OBSERVATION
Discharge: HOME | End: 2019-08-05
Attending: OBSTETRICS & GYNECOLOGY | Admitting: OBSTETRICS & GYNECOLOGY

## 2019-08-02 PROBLEM — Z34.90 PREGNANCY: Status: ACTIVE | Noted: 2019-08-02

## 2019-08-02 PROBLEM — O30.002 TWIN GESTATION IN SECOND TRIMESTER: Status: ACTIVE | Noted: 2019-08-02

## 2019-08-02 LAB
A1 MICROGLOB PLACENTAL VAG QL: POSITIVE
ABO GROUP BLD: NORMAL
BASOPHILS # BLD AUTO: 0.02 10*3/MM3 (ref 0–0.2)
BASOPHILS NFR BLD AUTO: 0.2 % (ref 0–2)
BILIRUB UR QL STRIP: NEGATIVE
BLD GP AB SCN SERPL QL: NEGATIVE
CLARITY UR: CLEAR
COLOR UR: YELLOW
DEPRECATED RDW RBC AUTO: 48.1 FL (ref 40–54)
EOSINOPHIL # BLD AUTO: 0.23 10*3/MM3 (ref 0–0.7)
EOSINOPHIL NFR BLD AUTO: 1.8 % (ref 0–4)
ERYTHROCYTE [DISTWIDTH] IN BLOOD BY AUTOMATED COUNT: 13.8 % (ref 12–15)
GLUCOSE UR STRIP-MCNC: ABNORMAL MG/DL
HCT VFR BLD AUTO: 30.3 % (ref 37–47)
HGB BLD-MCNC: 10.8 G/DL (ref 12–16)
HGB UR QL STRIP.AUTO: NEGATIVE
IMM GRANULOCYTES # BLD AUTO: 0.07 10*3/MM3 (ref 0–0.05)
IMM GRANULOCYTES NFR BLD AUTO: 0.5 % (ref 0–5)
KETONES UR QL STRIP: NEGATIVE
LEUKOCYTE ESTERASE UR QL STRIP.AUTO: NEGATIVE
LYMPHOCYTES # BLD AUTO: 1.27 10*3/MM3 (ref 0.72–4.86)
LYMPHOCYTES NFR BLD AUTO: 9.7 % (ref 15–45)
MCH RBC QN AUTO: 33.5 PG (ref 28–32)
MCHC RBC AUTO-ENTMCNC: 35.6 G/DL (ref 33–36)
MCV RBC AUTO: 94.1 FL (ref 82–98)
MONOCYTES # BLD AUTO: 1.21 10*3/MM3 (ref 0.19–1.3)
MONOCYTES NFR BLD AUTO: 9.2 % (ref 4–12)
NEUTROPHILS # BLD AUTO: 10.34 10*3/MM3 (ref 1.87–8.4)
NEUTROPHILS NFR BLD AUTO: 78.6 % (ref 39–78)
NITRITE UR QL STRIP: NEGATIVE
NRBC BLD AUTO-RTO: 0 /100 WBC (ref 0–0.2)
PH UR STRIP.AUTO: 6.5 [PH] (ref 5–8)
PLATELET # BLD AUTO: 140 10*3/MM3 (ref 130–400)
PMV BLD AUTO: 10.3 FL (ref 6–12)
PROT UR QL STRIP: NEGATIVE
RBC # BLD AUTO: 3.22 10*6/MM3 (ref 4.2–5.4)
RH BLD: POSITIVE
SP GR UR STRIP: 1.02 (ref 1–1.03)
T&S EXPIRATION DATE: NORMAL
UROBILINOGEN UR QL STRIP: ABNORMAL
WBC NRBC COR # BLD: 13.14 10*3/MM3 (ref 4.8–10.8)

## 2019-08-02 PROCEDURE — 86900 BLOOD TYPING SEROLOGIC ABO: CPT | Performed by: OBSTETRICS & GYNECOLOGY

## 2019-08-02 PROCEDURE — 86901 BLOOD TYPING SEROLOGIC RH(D): CPT | Performed by: OBSTETRICS & GYNECOLOGY

## 2019-08-02 PROCEDURE — 81003 URINALYSIS AUTO W/O SCOPE: CPT | Performed by: OBSTETRICS & GYNECOLOGY

## 2019-08-02 PROCEDURE — 84112 EVAL AMNIOTIC FLUID PROTEIN: CPT | Performed by: OBSTETRICS & GYNECOLOGY

## 2019-08-02 PROCEDURE — G0378 HOSPITAL OBSERVATION PER HR: HCPCS

## 2019-08-02 PROCEDURE — 96367 TX/PROPH/DG ADDL SEQ IV INF: CPT

## 2019-08-02 PROCEDURE — 76815 OB US LIMITED FETUS(S): CPT

## 2019-08-02 PROCEDURE — 25010000002 AMPICILLIN PER 500 MG: Performed by: OBSTETRICS & GYNECOLOGY

## 2019-08-02 PROCEDURE — 99219 PR INITIAL OBSERVATION CARE/DAY 50 MINUTES: CPT | Performed by: OBSTETRICS & GYNECOLOGY

## 2019-08-02 PROCEDURE — 96365 THER/PROPH/DIAG IV INF INIT: CPT

## 2019-08-02 PROCEDURE — 85025 COMPLETE CBC W/AUTO DIFF WBC: CPT | Performed by: OBSTETRICS & GYNECOLOGY

## 2019-08-02 PROCEDURE — 25010000002 AZITHROMYCIN PER 500 MG: Performed by: OBSTETRICS & GYNECOLOGY

## 2019-08-02 PROCEDURE — 86850 RBC ANTIBODY SCREEN: CPT | Performed by: OBSTETRICS & GYNECOLOGY

## 2019-08-02 RX ORDER — SODIUM CHLORIDE 0.9 % (FLUSH) 0.9 %
5 SYRINGE (ML) INJECTION AS NEEDED
Status: DISCONTINUED | OUTPATIENT
Start: 2019-08-02 | End: 2019-08-05 | Stop reason: HOSPADM

## 2019-08-02 RX ORDER — ACETAMINOPHEN 325 MG/1
650 TABLET ORAL EVERY 4 HOURS PRN
Status: DISCONTINUED | OUTPATIENT
Start: 2019-08-02 | End: 2019-08-05 | Stop reason: HOSPADM

## 2019-08-02 RX ORDER — SODIUM CHLORIDE 0.9 % (FLUSH) 0.9 %
3 SYRINGE (ML) INJECTION EVERY 12 HOURS SCHEDULED
Status: DISCONTINUED | OUTPATIENT
Start: 2019-08-02 | End: 2019-08-05 | Stop reason: HOSPADM

## 2019-08-02 RX ORDER — HYDROXYZINE HYDROCHLORIDE 25 MG/1
50 TABLET, FILM COATED ORAL NIGHTLY PRN
Status: DISCONTINUED | OUTPATIENT
Start: 2019-08-02 | End: 2019-08-05 | Stop reason: HOSPADM

## 2019-08-02 RX ADMIN — AMPICILLIN SODIUM 2 G: 2 INJECTION, POWDER, FOR SOLUTION INTRAVENOUS at 21:22

## 2019-08-02 RX ADMIN — AZITHROMYCIN MONOHYDRATE 500 MG: 500 INJECTION, POWDER, LYOPHILIZED, FOR SOLUTION INTRAVENOUS at 22:32

## 2019-08-02 RX ADMIN — ACETAMINOPHEN 650 MG: 325 TABLET, FILM COATED ORAL at 23:34

## 2019-08-02 NOTE — H&P
"[facility]  Obstetric History and Physical    Chief Complaint   Patient presents with   • Vaginal Bleeding     since 0800 this am, states bleeding has been a small amount and bright red, denies any pain.  states her babies have been moving this morning but not as much as usual.       Subjective     Patient is a 29 y.o. female  currently at 21w6d, who presents with spotting 3 times over the last 12 hours. She has twins with premature cervical dilation and has been under the care of [facility]. She currently has a 3\" donut pessary in place, and is using vaginal progesterone. She noticed the blood initially with voiding, but the last time had a spot on a pad. She denies leaking fluid at home, but had a small gush on L&D and a few drips while walking to the bathroom. She denies fever, chills, cramping, or abdominal pain. Her symptoms are unchanged since starting, and there are no aggravating factors.    No current facility-administered medications on file prior to encounter.      Current Outpatient Medications on File Prior to Encounter   Medication Sig   • aspirin 81 MG chewable tablet Chew 81 mg Daily.   • folic acid (FOLVITE) 1 MG tablet Take 1 mg by mouth Daily.   • Omega-3 Fatty Acids (FISH OIL PO) Take  by mouth.   • Prenatal Vit-Fe Fumarate-FA (PRENATAL FORMULA PO) Take  by mouth.   • progesterone (PROMETRIUM) 100 MG capsule Take 2 capsules by mouth Daily. (Patient taking differently: Insert 200 mg into the vagina Daily.)   • [DISCONTINUED] progesterone (PROMETRIUM) 100 MG capsule Take 2 capsules by mouth Daily.      Allergies   Allergen Reactions   • Diphenhydramine Rash   • Sulfa Antibiotics Rash         Past OB History:     Obstetric History       T0      L0     SAB0   TAB0   Ectopic0   Molar0   Multiple0   Live Births0       # Outcome Date GA Lbr Adam/2nd Weight Sex Delivery Anes PTL Lv   1 Current                   Past Medical History: Past Medical History:   Diagnosis Date   • Family history of " polyps in the colon    • PONV (postoperative nausea and vomiting)       Past Surgical History Past Surgical History:   Procedure Laterality Date   • ADENOIDECTOMY     • COLONOSCOPY N/A 3/14/2017    Procedure: COLONOSCOPY WITH ANESTHESIA;  Surgeon: Tony Farias DO;  Location: [facility] ENDOSCOPY;  Service:    • LEEP  2010   • TONSILLECTOMY        Family History: Family History   Problem Relation Age of Onset   • Colon polyps Mother 19        MULTIPLE POLYPS   • Hypertension Father    • Diabetes Father         PRE-DIABETES   • Obesity Father    • Hypertension Brother    • Breast cancer Maternal Grandmother         POST-MENOPAUSE   • Heart disease Maternal Grandfather    • Diabetes Paternal Grandfather    • Heart disease Paternal Grandfather 56        BYPASS AND VALVE REPLACEMENT   • Colon cancer Neg Hx    • Rectal cancer Neg Hx    • Esophageal cancer Neg Hx    • Liver cancer Neg Hx    • Liver disease Neg Hx    • Stomach cancer Neg Hx       Social History:  reports that she has never smoked. She has never used smokeless tobacco.   reports that she does not drink alcohol.   reports that she does not use drugs.        Review of Systems   Constitutional: Negative for chills and fever.   HENT: Negative for congestion.    Eyes: Negative for visual disturbance.   Respiratory: Negative for shortness of breath.    Cardiovascular: Negative for chest pain.   Gastrointestinal: Negative for abdominal pain and nausea.   Endocrine: Negative for polyuria.   Genitourinary: Positive for vaginal bleeding and vaginal discharge. Negative for difficulty urinating.   Musculoskeletal: Negative for back pain.   Skin: Negative for rash.   Allergic/Immunologic: Negative for immunocompromised state.   Neurological: Negative for headaches.   Hematological: Does not bruise/bleed easily.         Objective     Vital Signs Range for the last 24 hours  Temperature: Temp:  [97.5 °F (36.4 °C)-97.8 °F (36.6 °C)] 97.8 °F (36.6 °C)   Temp Source: Temp  src: Axillary   BP: BP: (106-118)/(60-64) 106/60   Pulse: Heart Rate:  [] 81   Respirations: Resp:  [16] 16   SPO2: SpO2:  [97 %-98 %] 97 %   O2 Amount (l/min):     O2 Devices Device (Oxygen Therapy): room air   Weight: Weight:  [76.7 kg (169 lb)] 76.7 kg (169 lb)     Physical Examination: General appearance - alert, well appearing, and in no distress  Eyes - sclera anicteric  Neck - trachea midline  Chest - breathing unlabored  Heart - normal rate and regular rhythm  Abdomen - soft, nontender, nondistended  Pelvic - pessary in place, no active vaginal bleeding, small amount clear fluid on glove  Musculoskeletal - no calf tenderness  Extremities - no ankle edema  Skin - dry    Fern negative    Presentation: Vertex / vertex by US   Cervix: Exam by:  MD, pessary in place, no active vaginal bleeding, small amount clear fluid     Fetal Heart Rate Assessment   Method: Fetal HR Assessment Method: intermittent auscultation, using Doppler   Beats/min: Fetal HR (beats/min): 145   Present x 2 by US     Laboratory Results: UA normal except for glucose, Amnisure positive  Radiology Review: Bedside US performed, vertex / vertex presentation, normal fluid x 2 by single deepest pocket    Assessment & Plan    Assessment:  1.  Intrauterine pregnancy at 21w6d gestation with twins.    2.  Leaking fluid, vaginal bleeding  3.  Obstetrical history significant for premature cervical dilation.  4.  Donut pessary in place    Plan:  1. Admit overnight for observation, will observe for leaking fluid and possible contractions  2. Plan of care has been reviewed with patient and Dr Rogers with [facility]  3.  Risks, benefits of treatment plan have been discussed.  4.  All questions have been answered.  5.  Plan repeat US in the morning to recheck fluid levels, if ruptured membranes then will give antibiotics for latency.  6. CBC on admission      Foster Flores MD  8/2/2019  5:37 PM

## 2019-08-02 NOTE — NURSING NOTE
Vaginal bleeding since 0800 this am, states bleeding has been a small amount and bright red, denies any pain.  states her babies have been moving this morning but not as much as usual. States small pink spot on a v pad she wore to hospital.

## 2019-08-03 ENCOUNTER — APPOINTMENT (OUTPATIENT)
Dept: ULTRASOUND IMAGING | Facility: HOSPITAL | Age: 29
End: 2019-08-03

## 2019-08-03 PROCEDURE — 99224 PR SBSQ OBSERVATION CARE/DAY 15 MINUTES: CPT | Performed by: OBSTETRICS & GYNECOLOGY

## 2019-08-03 PROCEDURE — 25010000002 AMPICILLIN PER 500 MG: Performed by: OBSTETRICS & GYNECOLOGY

## 2019-08-03 PROCEDURE — 76815 OB US LIMITED FETUS(S): CPT

## 2019-08-03 PROCEDURE — 25010000002 AZITHROMYCIN PER 500 MG: Performed by: OBSTETRICS & GYNECOLOGY

## 2019-08-03 PROCEDURE — G0378 HOSPITAL OBSERVATION PER HR: HCPCS

## 2019-08-03 RX ADMIN — AZITHROMYCIN MONOHYDRATE 500 MG: 500 INJECTION, POWDER, LYOPHILIZED, FOR SOLUTION INTRAVENOUS at 22:51

## 2019-08-03 RX ADMIN — AMPICILLIN SODIUM 2 G: 2 INJECTION, POWDER, FOR SOLUTION INTRAVENOUS at 21:37

## 2019-08-03 RX ADMIN — AMPICILLIN SODIUM 2 G: 2 INJECTION, POWDER, FOR SOLUTION INTRAVENOUS at 03:09

## 2019-08-03 RX ADMIN — ACETAMINOPHEN 650 MG: 325 TABLET, FILM COATED ORAL at 09:46

## 2019-08-03 RX ADMIN — AMPICILLIN SODIUM 2 G: 2 INJECTION, POWDER, FOR SOLUTION INTRAVENOUS at 15:40

## 2019-08-03 RX ADMIN — SODIUM CHLORIDE, PRESERVATIVE FREE 10 ML: 5 INJECTION INTRAVENOUS at 09:44

## 2019-08-03 RX ADMIN — ACETAMINOPHEN 650 MG: 325 TABLET, FILM COATED ORAL at 15:45

## 2019-08-03 RX ADMIN — ACETAMINOPHEN 650 MG: 325 TABLET, FILM COATED ORAL at 19:52

## 2019-08-03 RX ADMIN — AMPICILLIN SODIUM 2 G: 2 INJECTION, POWDER, FOR SOLUTION INTRAVENOUS at 09:35

## 2019-08-03 NOTE — PROGRESS NOTES
No c/o contractions but leaking small amount of fluid when ambulating to bathroom  Fern +  IV antibiotics started  Will remove pessary if signs of labor or infection

## 2019-08-03 NOTE — PROGRESS NOTES
Foster Flores MD  [facility]  [address]  [place] [address]  Office [phone]  Fax [phone]      [facility]  Corrine Conte  : 1990  MRN: [id number]  CSN: [id number]    Labor progress note    Subjective   She reports leaking small amounts of clear fluid. No vaginal bleeding or contractions. No fever or chills.     Objective   Min/max vitals past 24 hours:  Temp  Min: 97.5 °F (36.4 °C)  Max: 99.1 °F (37.3 °C)   BP  Min: 99/62  Max: 119/76   Pulse  Min: 78  Max: 101   Resp  Min: 16  Max: 18        FHT's: Present by doppler and US    No acute distress  Breathing unlabored  Abdomen nontender, uterine fundus nontender  No vaginal exam performed     US today   Shows oligohydramnios baby A      Assessment   1. IUP at 22w0d  2. Twins  3. Ruptured membranes, baby A     Plan   1.   Continue IV antibiotics, observe for signs of infection or labor  2.   Repeat CBC in next couple of days  3.   Possible MFM consult on Monday    Foster Flores MD  8/3/2019  9:19 AM

## 2019-08-03 NOTE — NURSING NOTE
Pt assisted to bathroom to void, small amount of blood and mucus noted in toilet.  Approximately 1/2 of v pad saturated with clear fluid.  Dr. Flores informed.  Pt denies any pain.

## 2019-08-04 PROCEDURE — 99224 PR SBSQ OBSERVATION CARE/DAY 15 MINUTES: CPT | Performed by: OBSTETRICS & GYNECOLOGY

## 2019-08-04 PROCEDURE — G0378 HOSPITAL OBSERVATION PER HR: HCPCS

## 2019-08-04 PROCEDURE — 25010000002 AMPICILLIN PER 500 MG: Performed by: OBSTETRICS & GYNECOLOGY

## 2019-08-04 RX ORDER — AZITHROMYCIN 250 MG/1
500 TABLET, FILM COATED ORAL
Status: DISCONTINUED | OUTPATIENT
Start: 2019-08-04 | End: 2019-08-05 | Stop reason: HOSPADM

## 2019-08-04 RX ORDER — AMOXICILLIN 500 MG/1
1000 CAPSULE ORAL EVERY 8 HOURS SCHEDULED
Status: DISCONTINUED | OUTPATIENT
Start: 2019-08-04 | End: 2019-08-05 | Stop reason: HOSPADM

## 2019-08-04 RX ADMIN — AMPICILLIN SODIUM 2 G: 2 INJECTION, POWDER, FOR SOLUTION INTRAVENOUS at 15:32

## 2019-08-04 RX ADMIN — HYDROXYZINE HYDROCHLORIDE 50 MG: 25 TABLET, FILM COATED ORAL at 00:41

## 2019-08-04 RX ADMIN — AMOXICILLIN 1000 MG: 500 CAPSULE ORAL at 22:08

## 2019-08-04 RX ADMIN — AZITHROMYCIN 500 MG: 250 TABLET, FILM COATED ORAL at 22:08

## 2019-08-04 RX ADMIN — SODIUM CHLORIDE, PRESERVATIVE FREE 3 ML: 5 INJECTION INTRAVENOUS at 09:31

## 2019-08-04 RX ADMIN — ACETAMINOPHEN 650 MG: 325 TABLET, FILM COATED ORAL at 00:23

## 2019-08-04 RX ADMIN — AMPICILLIN SODIUM 2 G: 2 INJECTION, POWDER, FOR SOLUTION INTRAVENOUS at 03:58

## 2019-08-04 RX ADMIN — AMPICILLIN SODIUM 2 G: 2 INJECTION, POWDER, FOR SOLUTION INTRAVENOUS at 09:32

## 2019-08-04 NOTE — PLAN OF CARE
Problem: Patient Care Overview  Goal: Plan of Care Review  Outcome: Ongoing (interventions implemented as appropriate)   19 0633   Coping/Psychosocial   Plan of Care Reviewed With patient;spouse   Plan of Care Review   Progress no change   OTHER   Outcome Summary Patient is  with PPROM with monochorionic diamniotic twins. US done 8/3 and the fluid in sac a had decreased to 1.5. Patient receiving antibiotics.     Goal: Individualization and Mutuality  Outcome: Ongoing (interventions implemented as appropriate)    Goal: Discharge Needs Assessment  Outcome: Ongoing (interventions implemented as appropriate)    Goal: Interprofessional Rounds/Family Conf  Outcome: Ongoing (interventions implemented as appropriate)      Problem:  Labor (Adult,Obstetrics,Pediatric)  Goal: Signs and Symptoms of Listed Potential Problems Will be Absent, Minimized or Managed ( Labor)  Outcome: Ongoing (interventions implemented as appropriate)

## 2019-08-04 NOTE — PROGRESS NOTES
Foster Flores MD  [facility]  [address]  [place] [address]  Office [phone]  Fax [phone]      [facility]  Corrine Conte  : 1990  MRN: [id number]  CSN: [id number]    Labor progress note    22 1/7 weeks    Subjective   She reports leaking small amounts of clear fluid. No vaginal bleeding or contractions. No fever or chills.       Objective   Min/max vitals past 24 hours:  Temp  Min: 97.9 °F (36.6 °C)  Max: 98.8 °F (37.1 °C)   BP  Min: 98/57  Max: 112/66   Pulse  Min: 81  Max: 98   Resp  Min: 16  Max: 18        FHT's: Present x 2 by doppler  No acute distress  Breathing unlabored  Abdomen nontender, uterine fundus nontender  No vaginal exam performed              Assessment   1. IUP at 22w1d  2. Twins  3. Ruptured membranes, baby A     Plan   1.   Finish IV antibiotics this evening, change to oral antibiotics  2.  Observe for signs of infection or labor  3.  Repeat CBC and MFM consult tomorrow morning    Foster Flores MD  2019  9:38 AM

## 2019-08-04 NOTE — PLAN OF CARE
Problem: Patient Care Overview  Goal: Plan of Care Review  Outcome: Ongoing (interventions implemented as appropriate)   19 1546   Coping/Psychosocial   Plan of Care Reviewed With patient;spouse   Plan of Care Review   Progress no change   OTHER   Outcome Summary Patient is  with PPROM with monochorionic diamniotic twins. US done 8/3. Fluid in sac decreased to 1.5. Patient receiving antibiotics. 19 0330     Goal: Individualization and Mutuality  Outcome: Ongoing (interventions implemented as appropriate)   19 0633   Individualization   Patient Specific Goals (Include Timeframe) Stay pregnant with healthy fetus' until viability, No infection   Mutuality/Individual Preferences   How Would You and/or Your Support Person Like to Participate in Your Care? Be involved in every aspect of care and have all plans of care reviewed with patient and spouse   Mutuality/Individual Preferences   How to Address Anxieties/Fears All questions answered as they arise     Goal: Discharge Needs Assessment  Outcome: Ongoing (interventions implemented as appropriate)   19 1546   Discharge Needs Assessment   Readmission Within the Last 30 Days no previous admission in last 30 days   Concerns to be Addressed discharge planning   Patient/Family Anticipated Services at Transition none   Transportation Concerns car, none   Transportation Anticipated car, drives self   Anticipated Changes Related to Illness none   Equipment Needed After Discharge none   Disability   Equipment Currently Used at Home none       Problem:  Labor (Adult,Obstetrics,Pediatric)  Goal: Signs and Symptoms of Listed Potential Problems Will be Absent, Minimized or Managed ( Labor)  Outcome: Ongoing (interventions implemented as appropriate)   19 0633   Goal/Outcome Evaluation   Problems Assessed ( Labor) all   Problems Present ( Labor) premature rupture of membranes

## 2019-08-05 VITALS
BODY MASS INDEX: 26.53 KG/M2 | WEIGHT: 169 LBS | OXYGEN SATURATION: 99 % | HEIGHT: 67 IN | HEART RATE: 81 BPM | SYSTOLIC BLOOD PRESSURE: 109 MMHG | RESPIRATION RATE: 16 BRPM | DIASTOLIC BLOOD PRESSURE: 63 MMHG | TEMPERATURE: 98 F

## 2019-08-05 LAB
BASOPHILS # BLD AUTO: 0.03 10*3/MM3 (ref 0–0.2)
BASOPHILS NFR BLD AUTO: 0.2 % (ref 0–2)
DEPRECATED RDW RBC AUTO: 49 FL (ref 40–54)
EOSINOPHIL # BLD AUTO: 0.32 10*3/MM3 (ref 0–0.7)
EOSINOPHIL NFR BLD AUTO: 2.5 % (ref 0–4)
ERYTHROCYTE [DISTWIDTH] IN BLOOD BY AUTOMATED COUNT: 13.9 % (ref 12–15)
HCT VFR BLD AUTO: 32.3 % (ref 37–47)
HGB BLD-MCNC: 10.9 G/DL (ref 12–16)
IMM GRANULOCYTES # BLD AUTO: 0.09 10*3/MM3 (ref 0–0.05)
IMM GRANULOCYTES NFR BLD AUTO: 0.7 % (ref 0–5)
LYMPHOCYTES # BLD AUTO: 1.52 10*3/MM3 (ref 0.72–4.86)
LYMPHOCYTES NFR BLD AUTO: 12 % (ref 15–45)
MCH RBC QN AUTO: 32.7 PG (ref 28–32)
MCHC RBC AUTO-ENTMCNC: 33.7 G/DL (ref 33–36)
MCV RBC AUTO: 97 FL (ref 82–98)
MONOCYTES # BLD AUTO: 1.25 10*3/MM3 (ref 0.19–1.3)
MONOCYTES NFR BLD AUTO: 9.9 % (ref 4–12)
NEUTROPHILS # BLD AUTO: 9.47 10*3/MM3 (ref 1.87–8.4)
NEUTROPHILS NFR BLD AUTO: 74.7 % (ref 39–78)
NRBC BLD AUTO-RTO: 0 /100 WBC (ref 0–0.2)
PLATELET # BLD AUTO: 146 10*3/MM3 (ref 130–400)
PMV BLD AUTO: 10.1 FL (ref 6–12)
RBC # BLD AUTO: 3.33 10*6/MM3 (ref 4.2–5.4)
WBC NRBC COR # BLD: 12.68 10*3/MM3 (ref 4.8–10.8)

## 2019-08-05 PROCEDURE — 99217 PR OBSERVATION CARE DISCHARGE MANAGEMENT: CPT | Performed by: OBSTETRICS & GYNECOLOGY

## 2019-08-05 PROCEDURE — 85025 COMPLETE CBC W/AUTO DIFF WBC: CPT | Performed by: OBSTETRICS & GYNECOLOGY

## 2019-08-05 PROCEDURE — G0378 HOSPITAL OBSERVATION PER HR: HCPCS

## 2019-08-05 RX ORDER — AZITHROMYCIN 250 MG/1
TABLET, FILM COATED ORAL
Qty: 8 TABLET | Refills: 0 | Status: SHIPPED | OUTPATIENT
Start: 2019-08-06 | End: 2019-08-12 | Stop reason: HOSPADM

## 2019-08-05 RX ORDER — AMOXICILLIN 500 MG/1
1000 CAPSULE ORAL 3 TIMES DAILY
Qty: 26 CAPSULE | Refills: 0 | Status: SHIPPED | OUTPATIENT
Start: 2019-08-05 | End: 2019-08-12 | Stop reason: HOSPADM

## 2019-08-05 RX ADMIN — AMOXICILLIN 1000 MG: 500 CAPSULE ORAL at 05:59

## 2019-08-05 NOTE — PROGRESS NOTES
Foster Flores MD  [facility]  [address]  [place] [id number]  Office [phone]  Fax [phone]      [facility]  Corrine Conte  : 1990  MRN: [id number]  CSN: [id number]    Labor progress note    Subjective   She reports leaking small amounts of clear fluid. No vaginal bleeding or contractions. No fever or chills.           Objective   Min/max vitals past 24 hours:  Temp  Min: 97.9 °F (36.6 °C)  Max: 98.6 °F (37 °C)   BP  Min: 98/57  Max: 104/60   Pulse  Min: 78  Max: 82   Resp  Min: 16  Max: 16        FHT's: Present x 2 by doppler  No acute distress  Breathing unlabored  Abdomen nontender, uterine fundus nontender  No vaginal exam performed              Assessment   1. IUP at 22w2d  2. Twins  3. Ruptured membranes, baby A     Plan   1.  IV antibiotic course completed. Now on oral antibiotics.  2.  Observe for signs of infection or labor  3.  CBC and MFM consult this morning  4.  Possible discharge later today, pending MFM recommendations      Foster Flores MD  2019  7:39 AM

## 2019-08-05 NOTE — PAYOR COMM NOTE
"[facility]  JEANETTE,   [phone] OR  FAX  [phone]    REF:  [id number]    Corrine Conte (29 y.o. Female)     Date of Birth Social Security Number Address Home Phone MRN    1990  [address] [address] [phone] [id number]    Rastafari Marital Status          None        Admission Date Admission Type Admitting Provider Attending Provider Department, Room/Bed    8/2/19 Urgent Foster Flores MD Sublette, Matthew L, MD [facility] LABOR DELIVERY, L04/1    Discharge Date Discharge Disposition Discharge Destination                       Attending Provider:  Foster Flores MD    Allergies:  Diphenhydramine, Sulfa Antibiotics    Isolation:  None   Infection:  None   Code Status:  CPR    Ht:  170.2 cm (67\")   Wt:  76.7 kg (169 lb)    Admission Cmt:  None   Principal Problem:  None                Active Insurance as of 8/2/2019     Primary Coverage     Payor Plan Insurance Group Employer/Plan Group    [employer] [place] [id number]     Payor Plan Address Payor Plan Phone Number Payor Plan Fax Number Effective Dates    [address]   10/5/2014 - None Entered    [place] [id number]       Subscriber Name Subscriber Birth Date Member ID       NEIL CONTE 7/21/1984 [id number]                 Emergency Contacts      (Rel.) Home Phone Work Phone Mobile Phone    Neil Conte (Spouse) [phone] -- --        Darlene Phelan RN   Registered Nurse   Obstetrics   Nursing Note   Signed   Date of Service:  8/2/2019  6:15 PM   Creation Time:  8/2/2019  7:17 PM            Signed            Added by:  [x]Darlene Phelan, RN    Pt assisted to bathroom to void, small amount of blood and mucus noted in toilet.  Approximately 1/2 of v pad saturated with clear fluid.  Dr. Flores informed.  Pt denies any pain.                  Darlene Phelan, RN   Registered Nurse   Obstetrics   Nursing Note   Addendum   Date of Service:  " "2019  7:06 PM   Creation Time:  2019  7:03 PM                  Added by:  [dayne]Darlene Phelan RN    Pt states she started having some small amounts of watery fluid leak out since .          Revision History     19 0633    Coping/Psychosocial   Plan of Care Reviewed With patient;spouse   Plan of Care Review   Progress no change   OTHER   Outcome Summary Patient is 22/1 with PPROM with monochorionic diamniotic twins. US done 8/3 and the fluid in sac a had decreased to 1.5. Patient receiving antibiotics.        19 1546    Coping/Psychosocial   Plan of Care Reviewed With patient;spouse   Plan of Care Review   Progress no change   OTHER   Outcome Summary Patient is 22/1 with PPROM with monochorionic diamniotic twins. US done 8/3. Fluid in sac decreased to 1.5. Patient receiving antibiotics. 19 0330          History & Physical      Foster Flores MD at 2019  5:37 PM          [facility]  Obstetric History and Physical    Chief Complaint   Patient presents with   • Vaginal Bleeding     since 0800 this am, states bleeding has been a small amount and bright red, denies any pain.  states her babies have been moving this morning but not as much as usual.       Subjective     Patient is a 29 y.o. female  currently at 21w6d, who presents with spotting 3 times over the last 12 hours. She has twins with premature cervical dilation and has been under the care of [facility]. She currently has a 3\" donut pessary in place, and is using vaginal progesterone. She noticed the blood initially with voiding, but the last time had a spot on a pad. She denies leaking fluid at home, but had a small gush on L&D and a few drips while walking to the bathroom. She denies fever, chills, cramping, or abdominal pain. Her symptoms are unchanged since starting, and there are no aggravating factors.    No current facility-administered medications on file prior to encounter.      Current Outpatient Medications on File " Prior to Encounter   Medication Sig   • aspirin 81 MG chewable tablet Chew 81 mg Daily.   • folic acid (FOLVITE) 1 MG tablet Take 1 mg by mouth Daily.   • Omega-3 Fatty Acids (FISH OIL PO) Take  by mouth.   • Prenatal Vit-Fe Fumarate-FA (PRENATAL FORMULA PO) Take  by mouth.   • progesterone (PROMETRIUM) 100 MG capsule Take 2 capsules by mouth Daily. (Patient taking differently: Insert 200 mg into the vagina Daily.)   • [DISCONTINUED] progesterone (PROMETRIUM) 100 MG capsule Take 2 capsules by mouth Daily.      Allergies   Allergen Reactions   • Diphenhydramine Rash   • Sulfa Antibiotics Rash         Past OB History:     Obstetric History       T0      L0     SAB0   TAB0   Ectopic0   Molar0   Multiple0   Live Births0       # Outcome Date GA Lbr Adam/2nd Weight Sex Delivery Anes PTL Lv   1 Current                   Past Medical History: Past Medical History:   Diagnosis Date   • Family history of polyps in the colon    • PONV (postoperative nausea and vomiting)       Past Surgical History Past Surgical History:   Procedure Laterality Date   • ADENOIDECTOMY     • COLONOSCOPY N/A 3/14/2017    Procedure: COLONOSCOPY WITH ANESTHESIA;  Surgeon: Tony Farias DO;  Location: [facility] ENDOSCOPY;  Service:    • LEEP     • TONSILLECTOMY        Family History: Family History   Problem Relation Age of Onset   • Colon polyps Mother 19        MULTIPLE POLYPS   • Hypertension Father    • Diabetes Father         PRE-DIABETES   • Obesity Father    • Hypertension Brother    • Breast cancer Maternal Grandmother         POST-MENOPAUSE   • Heart disease Maternal Grandfather    • Diabetes Paternal Grandfather    • Heart disease Paternal Grandfather 56        BYPASS AND VALVE REPLACEMENT   • Colon cancer Neg Hx    • Rectal cancer Neg Hx    • Esophageal cancer Neg Hx    • Liver cancer Neg Hx    • Liver disease Neg Hx    • Stomach cancer Neg Hx       Social History:  reports that she has never smoked. She has never used  smokeless tobacco.   reports that she does not drink alcohol.   reports that she does not use drugs.        Review of Systems   Constitutional: Negative for chills and fever.   HENT: Negative for congestion.    Eyes: Negative for visual disturbance.   Respiratory: Negative for shortness of breath.    Cardiovascular: Negative for chest pain.   Gastrointestinal: Negative for abdominal pain and nausea.   Endocrine: Negative for polyuria.   Genitourinary: Positive for vaginal bleeding and vaginal discharge. Negative for difficulty urinating.   Musculoskeletal: Negative for back pain.   Skin: Negative for rash.   Allergic/Immunologic: Negative for immunocompromised state.   Neurological: Negative for headaches.   Hematological: Does not bruise/bleed easily.         Objective     Vital Signs Range for the last 24 hours  Temperature: Temp:  [97.5 °F (36.4 °C)-97.8 °F (36.6 °C)] 97.8 °F (36.6 °C)   Temp Source: Temp src: Axillary   BP: BP: (106-118)/(60-64) 106/60   Pulse: Heart Rate:  [] 81   Respirations: Resp:  [16] 16   SPO2: SpO2:  [97 %-98 %] 97 %   O2 Amount (l/min):     O2 Devices Device (Oxygen Therapy): room air   Weight: Weight:  [76.7 kg (169 lb)] 76.7 kg (169 lb)     Physical Examination: General appearance - alert, well appearing, and in no distress  Eyes - sclera anicteric  Neck - trachea midline  Chest - breathing unlabored  Heart - normal rate and regular rhythm  Abdomen - soft, nontender, nondistended  Pelvic - pessary in place, no active vaginal bleeding, small amount clear fluid on glove  Musculoskeletal - no calf tenderness  Extremities - no ankle edema  Skin - dry    Fern negative    Presentation: Vertex / vertex by US   Cervix: Exam by:  MD, pessary in place, no active vaginal bleeding, small amount clear fluid     Fetal Heart Rate Assessment   Method: Fetal HR Assessment Method: intermittent auscultation, using Doppler   Beats/min: Fetal HR (beats/min): 145   Present x 2 by US     Laboratory  Results: UA normal except for glucose, Amnisure positive  Radiology Review: Bedside US performed, vertex / vertex presentation, normal fluid x 2 by single deepest pocket    Assessment & Plan    Assessment:  1.  Intrauterine pregnancy at 21w6d gestation with twins.    2.  Leaking fluid, vaginal bleeding  3.  Obstetrical history significant for premature cervical dilation.  4.  Donut pessary in place    Plan:  1. Admit overnight for observation, will observe for leaking fluid and possible contractions  2. Plan of care has been reviewed with patient and Dr Rogers with [facility]  3.  Risks, benefits of treatment plan have been discussed.  4.  All questions have been answered.  5.  Plan repeat US in the morning to recheck fluid levels, if ruptured membranes then will give antibiotics for latency.  6. CBC on admission      Foster Flores MD  8/2/2019  5:37 PM    Electronically signed by Foster Flores MD at 8/2/2019  5:49 PM       Hospital Medications (all)       Dose Frequency Start End    acetaminophen (TYLENOL) tablet 650 mg 650 mg Every 4 Hours PRN 8/2/2019     Sig - Route: Take 2 tablets by mouth Every 4 (Four) Hours As Needed for Mild Pain  or Headache. - Oral    amoxicillin (AMOXIL) capsule 1,000 mg 1,000 mg Every 8 Hours Scheduled 8/4/2019 8/9/2019    Sig - Route: Take 2 capsules by mouth Every 8 (Eight) Hours. - Oral    ampicillin 2 g/100 mL 0.9% NS (MBP) 2 g Every 6 Hours 8/2/2019 8/4/2019    Sig - Route: Infuse 100 mL into a venous catheter Every 6 (Six) Hours. - Intravenous    azithromycin (ZITHROMAX) tablet 500 mg 500 mg Every 24 Hours Scheduled 8/4/2019 8/10/2019    Sig - Route: Take 2 tablets by mouth Daily. - Oral    AZITHROMYCIN 500 MG/250 ML 0.9% NS IVPB (vial-mate) 500 mg Every 24 Hours 8/2/2019 8/3/2019    Sig - Route: Infuse 500 mg into a venous catheter Daily. - Intravenous    hydrOXYzine (ATARAX) tablet 50 mg 50 mg Nightly PRN 8/2/2019     Sig - Route: Take 2 tablets by mouth At Night As  Needed for Sleep. - Oral    sodium chloride 0.9 % flush 3 mL 3 mL Every 12 Hours Scheduled 8/2/2019     Sig - Route: Infuse 3 mL into a venous catheter Every 12 (Twelve) Hours. - Intravenous    sodium chloride 0.9 % flush 5 mL 5 mL As Needed 8/2/2019     Sig - Route: Infuse 5 mL into a venous catheter As Needed for Line Care (After Medication Administration or Blood Draw). - Intravenous    ampicillin (PRINCIPEN) capsule 500 mg (Discontinued) 500 mg Every 6 Hours Scheduled 8/4/2019 8/4/2019    Sig - Route: Take 2 capsules by mouth Every 6 (Six) Hours. - Oral    Reason for Discontinue: Alternate therapy          Orders (last 72 hrs)     Start     Ordered    08/05/19 0702  Inpatient Maternal & Fetal Medicine Consult  IN AM     Specialty:  Maternal and Fetal Medicine  Provider:  (Not yet assigned)    08/04/19 1027    08/05/19 0600  CBC & Differential  Morning Draw      08/04/19 1027    08/05/19 0600  CBC Auto Differential  PROCEDURE ONCE      08/05/19 0001    08/04/19 2200  ampicillin (PRINCIPEN) capsule 500 mg  Every 6 Hours Scheduled,   Status:  Discontinued      08/04/19 0945    08/04/19 2200  azithromycin (ZITHROMAX) tablet 500 mg  Every 24 Hours Scheduled      08/04/19 0945    08/04/19 2200  amoxicillin (AMOXIL) capsule 1,000 mg  Every 8 Hours Scheduled      08/04/19 1046    08/03/19 0910  US Ob Limited 1 + Fetuses  1 Time Imaging      08/03/19 0750    08/03/19 0750  US Ob 14 + Weeks Each Additional Gestation  1 Time Imaging,   Status:  Canceled      08/03/19 0750    08/02/19 2200  AZITHROMYCIN 500 MG/250 ML 0.9% NS IVPB (vial-mate)  Every 24 Hours      08/02/19 2039 08/02/19 2130  ampicillin 2 g/100 mL 0.9% NS (MBP)  Every 6 Hours      08/02/19 2039 08/02/19 2130  sodium chloride 0.9 % flush 3 mL  Every 12 Hours Scheduled      08/02/19 2044 08/02/19 2044  Insert Peripheral IV  Once      08/02/19 2044 08/02/19 2044  PERIPHERAL IV CARE - Connectors / Hubs Must Be Scrubbed 15 Seconds Using 70% Alcohol &  Allowed to Dry Before Accessing Line  Continuous      08/02/19 2044 08/02/19 2043  sodium chloride 0.9 % flush 5 mL  As Needed      08/02/19 2044 08/02/19 1735  hydrOXYzine (ATARAX) tablet 50 mg  Nightly PRN      08/02/19 1735    08/02/19 1735  acetaminophen (TYLENOL) tablet 650 mg  Every 4 Hours PRN      08/02/19 1735    08/02/19 1735  Check and Document Fetal Heart Tones if Less Than 24 Weeks  Every Shift      08/02/19 1735    08/02/19 1735  Diet Regular  Diet Effective Now      08/02/19 1735    08/02/19 1735  CBC & Differential  Once      08/02/19 1735    08/02/19 1735  CBC Auto Differential  PROCEDURE ONCE      08/02/19 1735    08/02/19 1732  VTE Prophylaxis Not Indicated: No Risk Factors (0); </= 3 (Low Risk)  Once      08/02/19 1735    08/02/19 1731  Admit To Obstetrics Inpatient  Once      08/02/19 1735    08/02/19 1731  Code Status and Medical Interventions:  Continuous      08/02/19 1735    08/02/19 1731  Vital Signs Per Hospital Policy  Per Hospital Policy      08/02/19 1735    08/02/19 1731  Notify Provider (Specified)  Until Discontinued      08/02/19 1735    08/02/19 1731  Notify Provider if Membranes Ruptured, Bleeding Greater Than 1 Pad Per Hour, Fetal Heart Tone Abnormality or Severe Pain  Until Discontinued      08/02/19 1735    08/02/19 1731  Type & Screen  Once      08/02/19 1735    08/02/19 1720  Ok to leave toco off, apply toco if pt c/o cramping  Misc Nursing Order (Specify)  Once     Comments:  Ok to leave toco off, apply toco if pt c/o cramping    08/02/19 1914    08/02/19 1701  US Ob Limited 1 + Fetuses  1 Time Imaging      08/02/19 1646    08/02/19 1647  US Ob 14 + Weeks Single or First Gestation  1 Time Imaging,   Status:  Canceled      08/02/19 1646    08/02/19 1546  Urinalysis With Microscopic If Indicated (No Culture) -  Once      08/02/19 1546    08/02/19 1546  PAMG-1, Rapid Assay For ROM - Amniotic Fluid,  STAT      08/02/19 1546    08/02/19 1517  Initiate Observation Status   Once      19 1518    19 1431  Obtain fetal heart tones with doppler  Misc Nursing Order (Specify)  Once     Comments:  Obtain fetal heart tones with doppler    19 1518    Unscheduled  Change Peripheral IV Site  As Needed     Comments:  Frequency Per Facility Policy    19 2044    Unscheduled  PERIPHERAL IV CARE - Change Dressing As Needed When Damp, Loose or Soiled  As Needed      19 204             Physician Progress Notes (last 72 hours) (Notes from 2019  8:12 AM through 2019  8:12 AM)      Foster Flores MD at 2019  7:39 AM              Foster Flores MD  [facility]  [address] [place] [address] [address] [place], [place] [address]  Office [phone]  Fax [phone]       Patricia Conte  : 1990  MRN: [id number]  CSN: [id number]    Labor progress note    Subjective   She reports leaking small amounts of clear fluid. No vaginal bleeding or contractions. No fever or chills.          Objective   Min/max vitals past 24 hours:  Temp  Min: 97.9 °F (36.6 °C)  Max: 98.6 °F (37 °C)   BP  Min: 98/57  Max: 104/60   Pulse  Min: 78  Max: 82   Resp  Min: 16  Max: 16        FHT's: Present x 2 by doppler  No acute distress  Breathing unlabored  Abdomen nontender, uterine fundus nontender  No vaginal exam performed             Assessment   1. IUP at 22w2d  2. Twins  3. Ruptured membranes, baby A    Plan   1.  IV antibiotic course completed. Now on oral antibiotics.  2.  Observe for signs of infection or labor  3.  CBC and MFM consult this morning  4.  Possible discharge later today, pending MFM recommendations      Foster Flores MD  2019  7:39 AM            Electronically signed by Foster Flores MD at 2019  7:42 AM     Foster Flores MD at 2019  9:38 AM              Foster Flores MD  [facility]  [address] [facility] [address] [place], [place] [address]  Office [phone]  Fax [phone]       Patricia BARON  Inés  : 1990  MRN: [id number]  CSN: [id number]    Labor progress note    22 1/7 weeks    Subjective   She reports leaking small amounts of clear fluid. No vaginal bleeding or contractions. No fever or chills.      Objective   Min/max vitals past 24 hours:  Temp  Min: 97.9 °F (36.6 °C)  Max: 98.8 °F (37.1 °C)   BP  Min: 98/57  Max: 112/66   Pulse  Min: 81  Max: 98   Resp  Min: 16  Max: 18        FHT's: Present x 2 by doppler  No acute distress  Breathing unlabored  Abdomen nontender, uterine fundus nontender  No vaginal exam performed             Assessment   4. IUP at 22w1d  5. Twins  6. Ruptured membranes, baby A    Plan   1.   Finish IV antibiotics this evening, change to oral antibiotics  2.  Observe for signs of infection or labor  3.  Repeat CBC and MFM consult tomorrow morning    Foster Flores MD  2019  9:38 AM            Electronically signed by Foster Flores MD at 2019  9:42 AM     Foster Flores MD at 8/3/2019  9:19 AM              Foster Flores MD  [facility]  [address] [place] [address] [address] [place] [address]  Office [phone]  Fax [phone]      [facility]  Corrine Conte  : 1990  MRN: [id number]  CSN: [id number]    Labor progress note    Subjective   She reports leaking small amounts of clear fluid. No vaginal bleeding or contractions. No fever or chills.    Objective   Min/max vitals past 24 hours:  Temp  Min: 97.5 °F (36.4 °C)  Max: 99.1 °F (37.3 °C)   BP  Min: 99/62  Max: 119/76   Pulse  Min: 78  Max: 101   Resp  Min: 16  Max: 18        FHT's: Present by doppler and US    No acute distress  Breathing unlabored  Abdomen nontender, uterine fundus nontender  No vaginal exam performed     US today   Shows oligohydramnios baby A     Assessment   7. IUP at 22w0d  8. Twins  9. Ruptured membranes, baby A    Plan   1.   Continue IV antibiotics, observe for signs of infection or labor  2.   Repeat CBC in next couple of days  3.   Possible MFM consult on  Monday    Foster Flores MD  8/3/2019  9:19 AM            Electronically signed by Foster Flores MD at 8/3/2019  9:25 AM     Foster Flores MD at 8/2/2019 10:39 PM        No c/o contractions but leaking small amount of fluid when ambulating to bathroom  Fern +  IV antibiotics started  Will remove pessary if signs of labor or infection    Electronically signed by Foster Flores MD at 8/2/2019 10:40 PM

## 2019-08-05 NOTE — CONSULTS
After Visit Summary   7/14/2017    Radha Leija    MRN: [id number]           Patient Information     Date Of Birth          1983        Visit Information        Provider Department      7/14/2017 9:30 Phoebe Matta MD [facility]        Today's Diagnoses     Supervision of other normal pregnancy, antepartum    -  1    GBS (group B Streptococcus carrier), +RV culture, currently pregnant           Follow-ups after your visit        Your next 10 appointments already scheduled     Jul 21, 2017  3:00 PM CDT   ESTABLISHED PRENATAL with Phoebe Hammer MD   [facility] ([facility])    [address] [place] [id number] [phone]              Who to contact     If you have questions or need follow up information about today's clinic visit or your schedule please contact [facility] directly at [phone].  Normal or non-critical lab and imaging results will be communicated to you by MyChart, letter or phone within 4 business days after the clinic has received the results. If you do not hear from us within 7 days, please contact the clinic through [employer] or phone. If you have a critical or abnormal lab result, we will notify you by phone as soon as possible.  Submit refill requests through [employer] or call your pharmacy and they will forward the refill request to us. Please allow 3 business days for your refill to be completed.          Additional Information About Your Visit        MyChart Information     [employer] gives you secure access to your electronic health record. If you see a primary care provider, you can also send messages to your care team and make appointments. If you have questions, please call your primary care clinic.  If you do not have a primary care provider, please call [phone] and they will assist you.        Care EveryWhere ID     This is your Care EveryWhere ID.  "MFM consult    Date of consults:  19    Reason for Consult:  PPROM/TWINS    Consulting Physician:  Dr. Flores    Active issues:    1.  IUP:  Pt is currently 22w2w, EDC 19.    2. PPROM:  Corrine is a .  She is well known to our office and being followed for mono/di twins and shortened cervix.  Currently has a pessary in place.  Pt reports light spotting and leaking early Friday and \"gush\" of fluid late Friday afternoon.  Fern +.  US supports ROM of twin A.  MVP on twin B nl.  She has completed IV antibiotics and now on oral antibiotics. She has remained afebrile and denies contractions.  Plan of care discussed with Dr. Rogers.  If patient remains stable, without signs of labor or infection, would support outpatient management.  She should remain on oral antibiotics x 1 wk.  Our goal would be to transfer her [place] when she is closer to 23-24wks for inpatient management based upon thoughts of resuscitation from the couple. Reviewed signs of PTL/infection and pt to return to L&D if any changes.      FOLLOW-UP:  Pt has a f/u appt in our office 19 @12:15    All questions answered.  Time on floor/unit for chart review, patient evaluation, discussion and coordination of care was >25 minutes with >50% in face to face counseling and coordination of care.        Discussed the case at length with Salma. Stable without signs of infection at this time  Will discuss longer term management when she returns to my office later this week.   Ángel    " This could be used by other organizations to access your [facility] medical records  [id number]        Your Vitals Were     Pulse Temperature Last Period BMI (Body Mass Index)          106 97.8  F (36.6  C) (Oral) 10/23/2016 24.52 kg/m2         Blood Pressure from Last 3 Encounters:   07/14/17 110/65   07/07/17 111/60   06/23/17 105/69    Weight from Last 3 Encounters:   07/14/17 170 lb 14.4 oz (77.5 kg)   07/07/17 168 lb 12.8 oz (76.6 kg)   06/23/17 167 lb 9.6 oz (76 kg)              Today, you had the following     No orders found for display       Primary Care Provider Office Phone # Fax #    Sandra S MARY Arboleda [phone]       [facility] [address] [place] [address]        Equal Access to Services     BRUNA ROGEL : Hadii aad ku elainao [place], waaxda luqadaha, qaybta kaalmada adeegyada, edd wei . So [facility] [phone].    ATENCIÓN: Si habla español, tiene a arrieta disposición servicios gratuitos de asistencia lingüística. Llame al [phone].    We comply with applicable federal civil rights laws and [place] laws. We do not discriminate on the basis of race, color, national origin, age, disability sex, sexual orientation or gender identity.            Thank you!     Thank you for choosing [facility]  for your care. Our goal is always to provide you with excellent care. Hearing back from our patients is one way we can continue to improve our services. Please take a few minutes to complete the written survey that you may receive in the mail after your visit with us. Thank you!             Your Updated Medication List - Protect others around you: Learn how to safely use, store and throw away your medicines at www.disposemymeds.org.          This list is accurate as of: 7/14/17  2:47 PM.  Always use your most recent med list.                   Brand Name Dispense Instructions for use Diagnosis    prenatal  multivitamin  plus iron 27-0.8 MG Tabs per tablet      Take 1 tablet by mouth daily

## 2019-08-05 NOTE — NURSING NOTE
Salma at bedside updating patient on plan of care. Plans to let patient go home on bedrest and will return Thursday for US.     Jaylene Rincon RN  8:43 AM

## 2019-08-05 NOTE — DISCHARGE SUMMARY
Patricia Conte  : 1990  MRN: [id number]  CSN: [id number]    Discharge Summary      Date of Admission: 2019   Date of Discharge: 2019   Discharge Diagnosis: 1.  Twin pregnancy at 22 weeks 2 days gestational age  2. ruptured membranes   Procedures Performed:  IV antibiotics      Consults:  Maternal-fetal medicine   Brief History: Patient is a 29 y.o.who presented to labor and delivery complaining of vaginal spotting. while in labor and delivery she was noted to be leaking a small amount of clear fluid.     Hospital Course:  She was admitted for observation, and was noted to continue to be leaking fluid.  AmniSure test was positive, and there was ferning noted on microscopy.  She was then started on 48 hours of intravenous ampicillin and a azithromycin.  She was observed for signs of labor or infection.  Serial ultrasounds revealed development of oligohydramnios of baby A.  She remained stable, and on hospital day #4, maternal-fetal medicine consult was obtained. WBC on day of discharge is stable compared to admission.  She will be discharged home on oral antibiotics and has follow-up scheduled on Thursday in the [facility] office. She is given instructions to return if she develops vaginal bleeding, contractions, or fever (or other signs of infection).   Pending Studies: None.     Condition at discharge: stable   Discharge Medications:    Your medication list      START taking these medications      Instructions Last Dose Given Next Dose Due   amoxicillin 500 MG capsule  Commonly known as:  AMOXIL      Take 2 capsules by mouth 3 (Three) Times a Day for 13 doses.       azithromycin 250 MG tablet  Commonly known as:  ZITHROMAX  Start taking on:  2019      Take 2 tablets daily for 4 days.          CHANGE how you take these medications      Instructions Last Dose Given Next Dose Due   progesterone 100 MG capsule  Commonly known as:  PROMETRIUM  What changed:  how to take this      Take 2  capsules by mouth Daily.          CONTINUE taking these medications      Instructions Last Dose Given Next Dose Due   aspirin 81 MG chewable tablet      Chew 81 mg Daily.       FISH OIL PO      Take  by mouth.       folic acid 1 MG tablet  Commonly known as:  FOLVITE      Take 1 mg by mouth Daily.       PRENATAL FORMULA PO      Take  by mouth.             Where to Get Your Medications      You can get these medications from any pharmacy    Bring a paper prescription for each of these medications  · amoxicillin 500 MG capsule  · azithromycin 250 MG tablet        Discharge Disposition: home   Follow-up: Future Appointments   Date Time Provider Department Center   8/19/2019 10:45 AM Foster Flores MD [facility] None            This note has been electronically signed.    Foster Flores MD  August 5, 2019  9:20 AM

## 2019-08-05 NOTE — PLAN OF CARE
Problem: Patient Care Overview  Goal: Plan of Care Review  Outcome: Outcome(s) achieved Date Met: 19 1007   Coping/Psychosocial   Plan of Care Reviewed With patient;spouse   Plan of Care Review   Progress no change     Goal: Individualization and Mutuality  Outcome: Outcome(s) achieved Date Met: 19 0633 19 1007   Individualization   Patient Specific Goals (Include Timeframe) --  Patient being discharged home on bedrest. Plans to see MFM on Thursday with hopes of transferring to [facility] to deliver at 23 weeks.    Mutuality/Individual Preferences   What Anxieties, Fears, Concerns, or Questions Do You Have About Your Care? Fear that labor will not hold off until viability --    How Would You and/or Your Support Person Like to Participate in Your Care? Be involved in every aspect of care and have all plans of care reviewed with patient and spouse --    Mutuality/Individual Preferences   How to Address Anxieties/Fears All questions answered as they arise --      Goal: Discharge Needs Assessment  Outcome: Outcome(s) achieved Date Met: 19 1546 19 1007   Discharge Needs Assessment   Readmission Within the Last 30 Days no previous admission in last 30 days --    Concerns to be Addressed --  adjustment to diagnosis/illness;coping/stress;discharge planning;grief and loss;medication   Patient/Family Anticipates Transition to --  home with family   Patient/Family Anticipated Services at Transition --  none   Transportation Concerns --  car, none   Transportation Anticipated --  family or friend will provide;car, drives self   Anticipated Changes Related to Illness --  inability to work  (able to work from home)   Equipment Needed After Discharge --  none   Outpatient/Agency/Support Group Needs --  ([facility])   Offered/Gave Vendor List --  no   Current Discharge Risk --  other (see comments)  (Risk of  delivery )   Disability   Equipment Currently Used at Home --   none       Problem:  Labor (Adult,Obstetrics,Pediatric)  Goal: Signs and Symptoms of Listed Potential Problems Will be Absent, Minimized or Managed ( Labor)  Outcome: Ongoing (interventions implemented as appropriate)   19 [facility]   Goal/Outcome Evaluation   Problems Assessed ( Labor) all   Problems Present ( Labor) premature rupture of membranes  (Pessary still in place. Patient educated on s/s of infection)

## 2019-08-05 NOTE — PAYOR COMM NOTE
"[facility]  JEANETTE,  [phone]  OR   FAX  [phone]    REF: [id number]     Corrine Conte (29 y.o. Female)     Date of Birth Social Security Number Address Home Phone MRN    1990  [address] [address] [phone] [id number]    Judaism Marital Status          None        Admission Date Admission Type Admitting Provider Attending Provider Department, Room/Bed    19 Urgent Foster Flores MD  [facility] LABOR DELIVERY, L04    Discharge Date Discharge Disposition Discharge Destination        2019 Home or Self Care              Attending Provider:  (none)   Allergies:  Diphenhydramine, Sulfa Antibiotics    Isolation:  None   Infection:  None   Code Status:  Prior    Ht:  170.2 cm (67\")   Wt:  76.7 kg (169 lb)    Admission Cmt:  None   Principal Problem:  Twin gestation in second trimester [O30.002]                 Active Insurance as of 2019     Primary Coverage     Payor Plan Insurance Group Employer/Plan Group    [facility] BLUE CROSS [place] [id number]     Payor Plan Address Payor Plan Phone Number Payor Plan Fax Number Effective Dates    [address]   10/5/2014 - None Entered    [place] [id number]       Subscriber Name Subscriber Birth Date Member ID       NEIL CONTE 1985 [id number]                 Emergency Contacts      (Rel.) Home Phone Work Phone Mobile Phone    InésNeil (Spouse) [phone] -- --               Discharge Summary      Foster Flores MD at 2019  9:19 AM           Patricia Conte  : 1990  MRN: [id number]  CSN: [id number]    Discharge Summary      Date of Admission: 2019   Date of Discharge: 2019   Discharge Diagnosis: 1.  Twin pregnancy at 22 weeks 2 days gestational age  2. ruptured membranes   Procedures Performed:  IV antibiotics      Consults:  Maternal-fetal medicine   Brief History: Patient is a 29 y.o.who presented to labor and " delivery complaining of vaginal spotting. while in labor and delivery she was noted to be leaking a small amount of clear fluid.     Hospital Course:  She was admitted for observation, and was noted to continue to be leaking fluid.  AmniSure test was positive, and there was ferning noted on microscopy.  She was then started on 48 hours of intravenous ampicillin and a azithromycin.  She was observed for signs of labor or infection.  Serial ultrasounds revealed development of oligohydramnios of baby A.  She remained stable, and on hospital day #4, maternal-fetal medicine consult was obtained. WBC on day of discharge is stable compared to admission.  She will be discharged home on oral antibiotics and has follow-up scheduled on Thursday in the [facility] office. She is given instructions to return if she develops vaginal bleeding, contractions, or fever (or other signs of infection).   Pending Studies: None.     Condition at discharge: stable   Discharge Medications:    Your medication list      START taking these medications      Instructions Last Dose Given Next Dose Due   amoxicillin 500 MG capsule  Commonly known as:  AMOXIL      Take 2 capsules by mouth 3 (Three) Times a Day for 13 doses.       azithromycin 250 MG tablet  Commonly known as:  ZITHROMAX  Start taking on:  8/6/2019      Take 2 tablets daily for 4 days.          CHANGE how you take these medications      Instructions Last Dose Given Next Dose Due   progesterone 100 MG capsule  Commonly known as:  PROMETRIUM  What changed:  how to take this      Take 2 capsules by mouth Daily.          CONTINUE taking these medications      Instructions Last Dose Given Next Dose Due   aspirin 81 MG chewable tablet      Chew 81 mg Daily.       FISH OIL PO      Take  by mouth.       folic acid 1 MG tablet  Commonly known as:  FOLVITE      Take 1 mg by mouth Daily.       PRENATAL FORMULA PO      Take  by mouth.             Where to Get Your Medications      You can get these  medications from any pharmacy    Bring a paper prescription for each of these medications  · amoxicillin 500 MG capsule  · azithromycin 250 MG tablet        Discharge Disposition: home   Follow-up: Future Appointments   Date Time Provider Department Center   8/19/2019 10:45 AM Foster Flores MD [facility] None            This note has been electronically signed.    Foster Flores MD  August 5, 2019  9:20 AM     Electronically signed by Foster Flores MD at 8/5/2019  9:25 AM

## 2019-08-11 ENCOUNTER — APPOINTMENT (OUTPATIENT)
Dept: ULTRASOUND IMAGING | Facility: HOSPITAL | Age: 29
End: 2019-08-11

## 2019-08-11 ENCOUNTER — ANESTHESIA EVENT (OUTPATIENT)
Dept: LABOR AND DELIVERY | Facility: HOSPITAL | Age: 29
End: 2019-08-11

## 2019-08-11 ENCOUNTER — HOSPITAL ENCOUNTER (INPATIENT)
Facility: HOSPITAL | Age: 29
LOS: 1 days | Discharge: HOME | End: 2019-08-12
Attending: OBSTETRICS & GYNECOLOGY | Admitting: OBSTETRICS & GYNECOLOGY

## 2019-08-11 ENCOUNTER — ANESTHESIA (OUTPATIENT)
Dept: LABOR AND DELIVERY | Facility: HOSPITAL | Age: 29
End: 2019-08-11

## 2019-08-11 PROBLEM — O60.00 PRETERM LABOR: Status: ACTIVE | Noted: 2019-08-11

## 2019-08-11 LAB
ABO GROUP BLD: NORMAL
BASOPHILS # BLD AUTO: 0.04 10*3/MM3 (ref 0–0.2)
BASOPHILS NFR BLD AUTO: 0.3 % (ref 0–1.5)
BLD GP AB SCN SERPL QL: NEGATIVE
DEPRECATED RDW RBC AUTO: 43.5 FL (ref 37–54)
EOSINOPHIL # BLD AUTO: 0.15 10*3/MM3 (ref 0–0.4)
EOSINOPHIL NFR BLD AUTO: 0.9 % (ref 0.3–6.2)
ERYTHROCYTE [DISTWIDTH] IN BLOOD BY AUTOMATED COUNT: 13 % (ref 12.3–15.4)
HCT VFR BLD AUTO: 33.1 % (ref 34–46.6)
HGB BLD-MCNC: 11.8 G/DL (ref 12–15.9)
IMM GRANULOCYTES # BLD AUTO: 0.12 10*3/MM3 (ref 0–0.05)
IMM GRANULOCYTES NFR BLD AUTO: 0.8 % (ref 0–0.5)
LYMPHOCYTES # BLD AUTO: 1.14 10*3/MM3 (ref 0.7–3.1)
LYMPHOCYTES NFR BLD AUTO: 7.1 % (ref 19.6–45.3)
MCH RBC QN AUTO: 32.9 PG (ref 26.6–33)
MCHC RBC AUTO-ENTMCNC: 35.6 G/DL (ref 31.5–35.7)
MCV RBC AUTO: 92.2 FL (ref 79–97)
MONOCYTES # BLD AUTO: 1.25 10*3/MM3 (ref 0.1–0.9)
MONOCYTES NFR BLD AUTO: 7.8 % (ref 5–12)
NEUTROPHILS # BLD AUTO: 13.29 10*3/MM3 (ref 1.7–7)
NEUTROPHILS NFR BLD AUTO: 83.1 % (ref 42.7–76)
NRBC BLD AUTO-RTO: 0 /100 WBC (ref 0–0.2)
PLATELET # BLD AUTO: 163 10*3/MM3 (ref 140–450)
PMV BLD AUTO: 9.8 FL (ref 6–12)
RBC # BLD AUTO: 3.59 10*6/MM3 (ref 3.77–5.28)
RH BLD: POSITIVE
T&S EXPIRATION DATE: NORMAL
WBC NRBC COR # BLD: 15.99 10*3/MM3 (ref 3.4–10.8)

## 2019-08-11 PROCEDURE — 59409 OBSTETRICAL CARE: CPT | Performed by: OBSTETRICS & GYNECOLOGY

## 2019-08-11 PROCEDURE — 86923 COMPATIBILITY TEST ELECTRIC: CPT

## 2019-08-11 PROCEDURE — 88305 TISSUE EXAM BY PATHOLOGIST: CPT | Performed by: OBSTETRICS & GYNECOLOGY

## 2019-08-11 PROCEDURE — 25010000002 AMPICILLIN PER 500 MG: Performed by: OBSTETRICS & GYNECOLOGY

## 2019-08-11 PROCEDURE — 25010000002 ROPIVACAINE PER 1 MG: Performed by: NURSE ANESTHETIST, CERTIFIED REGISTERED

## 2019-08-11 PROCEDURE — 86900 BLOOD TYPING SEROLOGIC ABO: CPT | Performed by: OBSTETRICS & GYNECOLOGY

## 2019-08-11 PROCEDURE — 85025 COMPLETE CBC W/AUTO DIFF WBC: CPT | Performed by: OBSTETRICS & GYNECOLOGY

## 2019-08-11 PROCEDURE — 25010000002 GENTAMICIN PER 80 MG: Performed by: OBSTETRICS & GYNECOLOGY

## 2019-08-11 PROCEDURE — 86850 RBC ANTIBODY SCREEN: CPT | Performed by: OBSTETRICS & GYNECOLOGY

## 2019-08-11 PROCEDURE — 25010000002 FENTANYL CITRATE (PF) 250 MCG/5ML SOLUTION: Performed by: NURSE ANESTHETIST, CERTIFIED REGISTERED

## 2019-08-11 PROCEDURE — 36415 COLL VENOUS BLD VENIPUNCTURE: CPT | Performed by: OBSTETRICS & GYNECOLOGY

## 2019-08-11 PROCEDURE — 86901 BLOOD TYPING SEROLOGIC RH(D): CPT | Performed by: OBSTETRICS & GYNECOLOGY

## 2019-08-11 PROCEDURE — 59410 OBSTETRICAL CARE: CPT | Performed by: OBSTETRICS & GYNECOLOGY

## 2019-08-11 PROCEDURE — 76815 OB US LIMITED FETUS(S): CPT

## 2019-08-11 RX ORDER — ZOLPIDEM TARTRATE 5 MG/1
5 TABLET ORAL NIGHTLY PRN
Status: DISCONTINUED | OUTPATIENT
Start: 2019-08-11 | End: 2019-08-12 | Stop reason: HOSPADM

## 2019-08-11 RX ORDER — BUTORPHANOL TARTRATE 1 MG/ML
1 INJECTION, SOLUTION INTRAMUSCULAR; INTRAVENOUS
Status: DISCONTINUED | OUTPATIENT
Start: 2019-08-11 | End: 2019-08-11 | Stop reason: SDUPTHER

## 2019-08-11 RX ORDER — PROMETHAZINE HYDROCHLORIDE 12.5 MG/1
12.5 SUPPOSITORY RECTAL EVERY 6 HOURS PRN
Status: DISCONTINUED | OUTPATIENT
Start: 2019-08-11 | End: 2019-08-12 | Stop reason: HOSPADM

## 2019-08-11 RX ORDER — OXYTOCIN/0.9 % SODIUM CHLORIDE 30/500 ML
125 PLASTIC BAG, INJECTION (ML) INTRAVENOUS CONTINUOUS PRN
Status: DISCONTINUED | OUTPATIENT
Start: 2019-08-11 | End: 2019-08-12 | Stop reason: HOSPADM

## 2019-08-11 RX ORDER — BISACODYL 10 MG
10 SUPPOSITORY, RECTAL RECTAL DAILY PRN
Status: DISCONTINUED | OUTPATIENT
Start: 2019-08-12 | End: 2019-08-12 | Stop reason: HOSPADM

## 2019-08-11 RX ORDER — MAGNESIUM SULFATE HEPTAHYDRATE 40 MG/ML
INJECTION, SOLUTION INTRAVENOUS
Status: COMPLETED
Start: 2019-08-11 | End: 2019-08-11

## 2019-08-11 RX ORDER — CALCIUM CARBONATE 200(500)MG
2 TABLET,CHEWABLE ORAL 3 TIMES DAILY PRN
Status: DISCONTINUED | OUTPATIENT
Start: 2019-08-11 | End: 2019-08-12 | Stop reason: HOSPADM

## 2019-08-11 RX ORDER — BUTORPHANOL TARTRATE 1 MG/ML
2 INJECTION, SOLUTION INTRAMUSCULAR; INTRAVENOUS
Status: DISCONTINUED | OUTPATIENT
Start: 2019-08-11 | End: 2019-08-12 | Stop reason: HOSPADM

## 2019-08-11 RX ORDER — ONDANSETRON 4 MG/1
4 TABLET, FILM COATED ORAL EVERY 6 HOURS PRN
Status: DISCONTINUED | OUTPATIENT
Start: 2019-08-11 | End: 2019-08-12 | Stop reason: HOSPADM

## 2019-08-11 RX ORDER — METHYLERGONOVINE MALEATE 0.2 MG/ML
200 INJECTION INTRAVENOUS ONCE AS NEEDED
Status: DISCONTINUED | OUTPATIENT
Start: 2019-08-11 | End: 2019-08-12 | Stop reason: HOSPADM

## 2019-08-11 RX ORDER — MISOPROSTOL 200 UG/1
800 TABLET ORAL AS NEEDED
Status: DISCONTINUED | OUTPATIENT
Start: 2019-08-11 | End: 2019-08-12 | Stop reason: HOSPADM

## 2019-08-11 RX ORDER — LIDOCAINE HYDROCHLORIDE 10 MG/ML
5 INJECTION, SOLUTION EPIDURAL; INFILTRATION; INTRACAUDAL; PERINEURAL AS NEEDED
Status: DISCONTINUED | OUTPATIENT
Start: 2019-08-11 | End: 2019-08-12 | Stop reason: HOSPADM

## 2019-08-11 RX ORDER — IBUPROFEN 600 MG/1
600 TABLET ORAL EVERY 8 HOURS PRN
Status: DISCONTINUED | OUTPATIENT
Start: 2019-08-11 | End: 2019-08-12 | Stop reason: HOSPADM

## 2019-08-11 RX ORDER — SODIUM CHLORIDE, SODIUM LACTATE, POTASSIUM CHLORIDE, CALCIUM CHLORIDE 600; 310; 30; 20 MG/100ML; MG/100ML; MG/100ML; MG/100ML
125 INJECTION, SOLUTION INTRAVENOUS CONTINUOUS
Status: DISCONTINUED | OUTPATIENT
Start: 2019-08-11 | End: 2019-08-12 | Stop reason: HOSPADM

## 2019-08-11 RX ORDER — CARBOPROST TROMETHAMINE 250 UG/ML
250 INJECTION, SOLUTION INTRAMUSCULAR AS NEEDED
Status: DISCONTINUED | OUTPATIENT
Start: 2019-08-11 | End: 2019-08-12 | Stop reason: HOSPADM

## 2019-08-11 RX ORDER — FENTANYL CITRATE 50 UG/ML
INJECTION, SOLUTION INTRAMUSCULAR; INTRAVENOUS AS NEEDED
Status: DISCONTINUED | OUTPATIENT
Start: 2019-08-11 | End: 2019-08-11 | Stop reason: SURG

## 2019-08-11 RX ORDER — CARBOPROST TROMETHAMINE 250 UG/ML
INJECTION, SOLUTION INTRAMUSCULAR
Status: DISCONTINUED
Start: 2019-08-11 | End: 2019-08-11 | Stop reason: WASHOUT

## 2019-08-11 RX ORDER — MISOPROSTOL 200 UG/1
TABLET ORAL
Status: DISCONTINUED
Start: 2019-08-11 | End: 2019-08-11 | Stop reason: WASHOUT

## 2019-08-11 RX ORDER — PROMETHAZINE HYDROCHLORIDE 25 MG/1
25 TABLET ORAL EVERY 6 HOURS PRN
Status: DISCONTINUED | OUTPATIENT
Start: 2019-08-11 | End: 2019-08-12 | Stop reason: HOSPADM

## 2019-08-11 RX ORDER — ACETAMINOPHEN 325 MG/1
650 TABLET ORAL EVERY 4 HOURS PRN
Status: DISCONTINUED | OUTPATIENT
Start: 2019-08-11 | End: 2019-08-12 | Stop reason: HOSPADM

## 2019-08-11 RX ORDER — SODIUM CHLORIDE 0.9 % (FLUSH) 0.9 %
3 SYRINGE (ML) INJECTION EVERY 12 HOURS SCHEDULED
Status: DISCONTINUED | OUTPATIENT
Start: 2019-08-11 | End: 2019-08-12 | Stop reason: HOSPADM

## 2019-08-11 RX ORDER — PRENATAL VIT/IRON FUM/FOLIC AC 27MG-0.8MG
1 TABLET ORAL DAILY
Status: DISCONTINUED | OUTPATIENT
Start: 2019-08-12 | End: 2019-08-12 | Stop reason: HOSPADM

## 2019-08-11 RX ORDER — IBUPROFEN 800 MG/1
800 TABLET ORAL EVERY 8 HOURS PRN
Status: DISCONTINUED | OUTPATIENT
Start: 2019-08-11 | End: 2019-08-11 | Stop reason: HOSPADM

## 2019-08-11 RX ORDER — HYDROCODONE BITARTRATE AND ACETAMINOPHEN 7.5; 325 MG/1; MG/1
1 TABLET ORAL EVERY 4 HOURS PRN
Status: DISCONTINUED | OUTPATIENT
Start: 2019-08-11 | End: 2019-08-12 | Stop reason: HOSPADM

## 2019-08-11 RX ORDER — PROMETHAZINE HYDROCHLORIDE 25 MG/ML
12.5 INJECTION, SOLUTION INTRAMUSCULAR; INTRAVENOUS EVERY 6 HOURS PRN
Status: DISCONTINUED | OUTPATIENT
Start: 2019-08-11 | End: 2019-08-12 | Stop reason: HOSPADM

## 2019-08-11 RX ORDER — OXYTOCIN/0.9 % SODIUM CHLORIDE 30/500 ML
250 PLASTIC BAG, INJECTION (ML) INTRAVENOUS CONTINUOUS
Status: ACTIVE | OUTPATIENT
Start: 2019-08-11 | End: 2019-08-11

## 2019-08-11 RX ORDER — SODIUM CHLORIDE 0.9 % (FLUSH) 0.9 %
1-10 SYRINGE (ML) INJECTION AS NEEDED
Status: DISCONTINUED | OUTPATIENT
Start: 2019-08-11 | End: 2019-08-12 | Stop reason: HOSPADM

## 2019-08-11 RX ORDER — METHYLERGONOVINE MALEATE 0.2 MG/ML
INJECTION INTRAVENOUS
Status: DISCONTINUED
Start: 2019-08-11 | End: 2019-08-11 | Stop reason: WASHOUT

## 2019-08-11 RX ORDER — ONDANSETRON 2 MG/ML
4 INJECTION INTRAMUSCULAR; INTRAVENOUS EVERY 6 HOURS PRN
Status: DISCONTINUED | OUTPATIENT
Start: 2019-08-11 | End: 2019-08-11 | Stop reason: SDUPTHER

## 2019-08-11 RX ORDER — DOCUSATE SODIUM 100 MG/1
100 CAPSULE, LIQUID FILLED ORAL 2 TIMES DAILY PRN
Status: DISCONTINUED | OUTPATIENT
Start: 2019-08-11 | End: 2019-08-12 | Stop reason: HOSPADM

## 2019-08-11 RX ORDER — MAGNESIUM SULFATE 1 G/100ML
4 INJECTION INTRAVENOUS ONCE
Status: DISCONTINUED | OUTPATIENT
Start: 2019-08-11 | End: 2019-08-11

## 2019-08-11 RX ORDER — ONDANSETRON 4 MG/1
4 TABLET, FILM COATED ORAL EVERY 6 HOURS PRN
Status: DISCONTINUED | OUTPATIENT
Start: 2019-08-11 | End: 2019-08-11 | Stop reason: SDUPTHER

## 2019-08-11 RX ORDER — LIDOCAINE HYDROCHLORIDE AND EPINEPHRINE 15; 5 MG/ML; UG/ML
INJECTION, SOLUTION EPIDURAL AS NEEDED
Status: DISCONTINUED | OUTPATIENT
Start: 2019-08-11 | End: 2019-08-11 | Stop reason: SURG

## 2019-08-11 RX ORDER — BUTORPHANOL TARTRATE 1 MG/ML
1 INJECTION, SOLUTION INTRAMUSCULAR; INTRAVENOUS
Status: DISCONTINUED | OUTPATIENT
Start: 2019-08-11 | End: 2019-08-12 | Stop reason: HOSPADM

## 2019-08-11 RX ORDER — ONDANSETRON 2 MG/ML
4 INJECTION INTRAMUSCULAR; INTRAVENOUS EVERY 6 HOURS PRN
Status: DISCONTINUED | OUTPATIENT
Start: 2019-08-11 | End: 2019-08-12 | Stop reason: HOSPADM

## 2019-08-11 RX ORDER — TERBUTALINE SULFATE 1 MG/ML
0.25 INJECTION, SOLUTION SUBCUTANEOUS AS NEEDED
Status: DISCONTINUED | OUTPATIENT
Start: 2019-08-11 | End: 2019-08-12 | Stop reason: HOSPADM

## 2019-08-11 RX ORDER — OXYTOCIN/0.9 % SODIUM CHLORIDE 30/500 ML
999 PLASTIC BAG, INJECTION (ML) INTRAVENOUS ONCE
Status: COMPLETED | OUTPATIENT
Start: 2019-08-11 | End: 2019-08-11

## 2019-08-11 RX ORDER — EPHEDRINE SULFATE 50 MG/ML
5 INJECTION, SOLUTION INTRAVENOUS
Status: DISCONTINUED | OUTPATIENT
Start: 2019-08-11 | End: 2019-08-12 | Stop reason: HOSPADM

## 2019-08-11 RX ADMIN — MAGNESIUM SULFATE HEPTAHYDRATE 20 G: 40 INJECTION, SOLUTION INTRAVENOUS at 17:55

## 2019-08-11 RX ADMIN — OXYTOCIN-SODIUM CHLORIDE 0.9% IV SOLN 30 UNIT/500ML 250 ML/HR: 30-0.9/5 SOLUTION at 18:55

## 2019-08-11 RX ADMIN — LIDOCAINE HYDROCHLORIDE AND EPINEPHRINE 3 ML: 15; 5 INJECTION, SOLUTION EPIDURAL at 18:26

## 2019-08-11 RX ADMIN — OXYTOCIN-SODIUM CHLORIDE 0.9% IV SOLN 30 UNIT/500ML 999 ML/HR: 30-0.9/5 SOLUTION at 18:38

## 2019-08-11 RX ADMIN — FENTANYL CITRATE 25 MCG: 50 INJECTION INTRAMUSCULAR; INTRAVENOUS at 18:23

## 2019-08-11 RX ADMIN — AMPICILLIN 2 G: 2 INJECTION, POWDER, FOR SOLUTION INTRAVENOUS at 18:20

## 2019-08-11 RX ADMIN — FENTANYL CITRATE 225 MCG: 50 INJECTION INTRAMUSCULAR; INTRAVENOUS at 18:30

## 2019-08-11 RX ADMIN — SODIUM CHLORIDE, POTASSIUM CHLORIDE, SODIUM LACTATE AND CALCIUM CHLORIDE 999 ML/HR: 600; 310; 30; 20 INJECTION, SOLUTION INTRAVENOUS at 18:18

## 2019-08-11 RX ADMIN — GENTAMICIN SULFATE 310 MG: 40 INJECTION, SOLUTION INTRAMUSCULAR; INTRAVENOUS at 19:18

## 2019-08-11 RX ADMIN — ROPIVACAINE HYDROCHLORIDE 10 ML/HR: 2 INJECTION, SOLUTION EPIDURAL; INFILTRATION at 18:31

## 2019-08-11 RX ADMIN — SODIUM CHLORIDE, POTASSIUM CHLORIDE, SODIUM LACTATE AND CALCIUM CHLORIDE 125 ML/HR: 600; 310; 30; 20 INJECTION, SOLUTION INTRAVENOUS at 16:58

## 2019-08-11 RX ADMIN — ZOLPIDEM TARTRATE 5 MG: 5 TABLET ORAL at 23:39

## 2019-08-11 NOTE — H&P
Mickey Pereira MD  [facility]  [address]  [place] [address]  Office [phone]  Fax [phone]      [facility]  Corrine Conte  : 1990  MRN: [id number]  CSN: [id number]    History and Physical    Subjective   Corrine Conte is a 29 y.o. year old  with an Estimated Date of Delivery: 19 currently at 23w1d presenting with regular contractions and change in vaginal discharge or green color.  This started just a few hours ago.  Her chart is reviewed.  This patient is somewhat familiar to me.  She was recently admitted for  premature rupture of membranes at approximately 21 weeks and 6 days.  She was given IV antibiotics for 48 hours.  Maternal-fetal medicine felt it was safe to send her home as there was no evidence of infection or labor.  Her pregnancy is complicated by twins.  She had a pessary placed approximately 3 weeks ago due to early cervical dilation.  She had an ultrasound just 3 days ago with maternal-fetal medicine.  Those results are reviewed with neonatology today.  A was vertex and B was transverse at that time.    Prenatal care has been with Dr. Flores.  It has been complicated by See above.    Obstetric History       T0      L0     SAB0   TAB0   Ectopic0   Molar0   Multiple0   Live Births0       # Outcome Date GA Lbr Adam/2nd Weight Sex Delivery Anes PTL Lv   1 Current                   Past Medical History:   Diagnosis Date   • Family history of polyps in the colon    • PONV (postoperative nausea and vomiting)        Past Surgical History:   Procedure Laterality Date   • ADENOIDECTOMY     • COLONOSCOPY N/A 3/14/2017    Procedure: COLONOSCOPY WITH ANESTHESIA;  Surgeon: Tony Farias DO;  Location: [facility] ENDOSCOPY;  Service:    • LEEP     • TONSILLECTOMY           Current Facility-Administered Medications:   •  magnesium sulfate 20 GM/500ML infusion  - ADS Override Pull, , , ,   •  acetaminophen (TYLENOL) tablet 650 mg, 650  Spoke with pt and he states that he is having a deep cleaning on his teeth and he needs a medical clearance before he can schedule his procedure. Please advise   mg, Oral, Q4H PRN, Mickey Pereira MD  •  ampicillin 2 g/100 mL 0.9% NS (MBP), 2 g, Intravenous, Q6H, Mickey Pereira MD  •  butorphanol (STADOL) injection 1 mg, 1 mg, Intravenous, Q3H PRN, Mickey Pereira MD  •  butorphanol (STADOL) injection 2 mg, 2 mg, Intravenous, Q3H PRN, Mickey Pereira MD  •  gentamicin (GARAMYCIN) 310 mg in sodium chloride 0.9 % 100 mL IVPB, 5 mg/kg/day (Ideal), Intravenous, Q24H, Mickey Pereira MD  •  lactated ringers infusion, 125 mL/hr, Intravenous, Continuous, Mickey Pereira MD, Last Rate: 125 mL/hr at 08/11/19 1658, 125 mL/hr at 08/11/19 1658  •  lactated ringers infusion, 125 mL/hr, Intravenous, Continuous, Mickey Pereira MD  •  lidocaine PF 1% (XYLOCAINE) injection 5 mL, 5 mL, Intradermal, PRN, Mickey Pereira MD  •  sodium chloride 0.9 % flush 1-10 mL, 1-10 mL, Intravenous, PRN, Mickey Pereira MD  •  sodium chloride 0.9 % flush 3 mL, 3 mL, Intravenous, Q12H, Mickey Pereira MD  •  terbutaline (BRETHINE) injection 0.25 mg, 0.25 mg, Subcutaneous, PRN, Mickey Pereira MD    Allergies   Allergen Reactions   • Diphenhydramine Rash   • Sulfa Antibiotics Rash       Family History   Problem Relation Age of Onset   • Colon polyps Mother 19        MULTIPLE POLYPS   • Hypertension Father    • Diabetes Father         PRE-DIABETES   • Obesity Father    • Hypertension Brother    • Breast cancer Maternal Grandmother         POST-MENOPAUSE   • Heart disease Maternal Grandfather    • Diabetes Paternal Grandfather    • Heart disease Paternal Grandfather 56        BYPASS AND VALVE REPLACEMENT   • Colon cancer Neg Hx    • Rectal cancer Neg Hx    • Esophageal cancer Neg Hx    • Liver cancer Neg Hx    • Liver disease Neg Hx    • Stomach cancer Neg Hx        Social History     Tobacco Use   • Smoking status: Never Smoker   • Smokeless tobacco: Never Used   Substance Use Topics   • Alcohol use: No     Frequency: Never   • Drug use: No       Review of Systems        Objective   BP  (!) 77/55   Pulse 108   Temp 97.7 °F (36.5 °C) (Temporal)   Resp 18   SpO2 100%   General: well developed; well nourished  And very uncomfortable with contractions   Heart: Not performed.   Lungs: breathing is unlabored   Abdomen: No fundal tenderness but there are palpable contractions and she is tender with contractions.   FHT's:  Fetal heart tones are appropriate for both fetuses at the given gestational age.  And category 1  external monitors used   Cervix: was checked (by me): 10 cm / 100 % / +1 this is an estimation.  She has a pessary in place.  I feel that the pessary is holding the vertex in the lower uterine segment if not the vagina.   Presentation: cephalic   Contractions: regular     Bedside ultrasound performed by myself shows a to be in vertex presentation.  The head is in the left upper quadrant and appears to be in footling breech presentation.  Formal ultrasound has been ordered.  Ultrasound exam is difficult given the patient's discomfort.    EFW please see recent ultrasound for maternal-fetal medicine  Pelvis not clinically important     Prenatal Labs  Lab Results   Component Value Date    HGB 11.8 (L) 2019    ABORH A POS 2019    ABO A 2019    RH Positive 2019    ABSCRN Negative 2019       Current Labs Reviewed   Maternal-fetal medicine consults       Assessment   1. IUP at 23w1d  2. Group B strep status: unknown  3.  labor  4. Mono/Di twin pregnancy  5. Prolonged rupture of membranes  6. Suspected vertex/breech presentation  7. Based on history as well as green vaginal discharge, chorioamnionitis suspected.     Plan   1. Lengthy discussion with the patient as well as with neonatology.   2. IV antibiotics for suspected chorioamnionitis  3. Magnesium neuro protection  4. Will take to the operating room for removal of pessary as I anticipate fetus a will deliver quickly.  5. Depending on her clinical situation after delivery of fetus a, will  reevaluate  6. If she stabilizes, consider expectant management.  7. However, if labor continues especially if there is obvious rupture of membranes around fetus B, will proceed with  section for malpresentation  8. All this was discussed with the patient at length she verbalizes understanding.    Mickey Pereira MD  2019  5:54 PM

## 2019-08-11 NOTE — ANESTHESIA PREPROCEDURE EVALUATION
Anesthesia Evaluation     Patient summary reviewed and Nursing notes reviewed   history of anesthetic complications: PONV               Airway   Mallampati: I  TM distance: >3 FB  Neck ROM: full  Dental      Pulmonary - negative pulmonary ROS   Cardiovascular - negative cardio ROS        Neuro/Psych- negative ROS  GI/Hepatic/Renal/Endo - negative ROS     Musculoskeletal     Abdominal    Substance History      OB/GYN    (+) Pregnant,         Other                        Anesthesia Plan    ASA 2     CSE     Anesthetic plan, all risks, benefits, and alternatives have been provided, discussed and informed consent has been obtained with: patient and spouse/significant other.

## 2019-08-11 NOTE — L&D DELIVERY NOTE
[facility]  Twin Delivery Note    Delivery Details     TWIN A  Information for the patient's :  Matilda Conte [[id number]]       Delivery Method: Vaginal, Spontaneous      YOB: 2020  Time of Birth: 6:33 PM            Forceps?: No  Vaccuum?: No  Shoulder Dystocia present: No  Anesthesia:    Combined spinal epidural (CSE)    TWIN B        Information for the patient's :  Matilda Conte B [[id number]]       Delivery Method: Vaginal, Breech      YOB: 2020  Time of Birth: 6:36 PM            Forceps?: No  Vaccuum?: No  Shoulder Dystocia present: No    Anesthesia:   Combined spinal epidural (CSE)               Delivery narrative: Please see previous notes in chart regarding plans for evaluation of the pregnancies.    Once the anesthetic was adequate, the bed was broken away and the patient's legs were placed in stirrups.  The pessary was removed without difficulty or incident.  At this time, it was clear that twin A is vertex was in the vagina as a result of complete dilation of the cervix from  labor complicated by prolonged rupture of membranes.  There was also green discharge consistent with mucopurulent amniotic fluid which is consistent with chorioamnionitis.    Patient effectively pushed to deliver twin A in a vertex presentation atraumatically.  The cord was clamped and cut and the infant was swaddled and placed on the mother's abdomen.  Immediate evaluation of twin A's skull showed some molding likely consistent with pressure from the pessary.    At this time, I reevaluated the cervix which continued to be significantly dilated.  The membranes were taut and bulging almost to the level of the introitus.  This was clearly the amnio sac around fetus B.  This was ruptured and again mucopurulent fluid was noted.  With a single push, she delivered twin B in a breech presentation without difficulty or incident.  Cord was clamped and cut, the infant was  swaddled, and placed on the mother's abdomen.    During clamping of the cords, I clamped umbilical cord a with one clamp and umbilical cord B with 2 clamps in the event pathology needed differentiation.  Pregnancy was known to be a monochorionic pregnancy.    Placenta delivered spontaneously and intact.  Three-vessel cord was noted.  Lochia was minimal.  There were no lacerations.    Sponge and needle counts were correct.    Infant Details     TWIN A  Sex: female     Infant Name:  Birth Weight: No birth weight on file.   Length:    in   Apgars at 1 minute: 1   Apgars at 5 minutes: 1       TWIN B  Sex: female     Infant Name:  Birth Weight: No birth weight on file.   Length:    in                     Placenta, Cord, and Fluid    Placenta delivered:   Twin A-          Twin B- Placenta Delivered: Spontaneous   at: 8/11/2019  6:42 PM     Cord: Twin A-     Twin B-      Nuchal Cord? Twin A- Nuchal Cord?: no  Twin B- Nuchal Cord?: No   Cord Blood obtained: Twin A-                          Twin B-      Cord Gases obtained: Twin A-                            No results found for: PHCVEN, BECVEN   No results found for: PHCART, BECART   Twin B-       No results found for: PHCVEN, BECVEN   No results found for: PHCART, BECART       Repair    Episiotomy:  Not recorded:   Lacerations:  No  Estimated Blood Loss:       Complications  prolonged ROM, chorioamnionitis    Disposition  Mother to Remain in LD  in stable condition currently.  Baby A to remains with mom  in unstable condition currently.  Baby B to remains with mom  in unstable condition currently.        Mickey Pereira MD  08/11/19  7:05 PM

## 2019-08-11 NOTE — ANESTHESIA PROCEDURE NOTES
CSE Block      Patient reassessed immediately prior to procedure    Patient location during procedure: OB  Reason for block: labor pain  Staffing  Resident/CRNA: Danae Myers CRNA  Preanesthetic Checklist  Completed: patient identified, site marked, surgical consent, pre-op evaluation, timeout performed, IV checked, risks and benefits discussed and monitors and equipment checked  CSE  Patient position: right lateral decubitus  Prep: ChloraPrep  Patient monitoring: blood pressure monitoring and EKG  Procedures: palpation technique  Spinal Needle  Needle type: Pencan   Needle gauge: 25 G  Approach: midline  Location: L3-4  Fluid Appearance: clear    Epidural Needle  Injection technique: MIR saline  Needle type: Tuohy   Needle gauge: 17 G  Location: L3-L4  Loss of Resistance: 5cm  Cath Depth at Skin (cm): 12  Aspiration: negative  Test dose: negative      Assessment  Dressing:occlusive dressing applied and secured with tape  Pt Tolerance:patient tolerated the procedure well with no apparent complications  Complications:no

## 2019-08-11 NOTE — PROGRESS NOTES
Addendum:  Further discussion with neonatology, locally.  Per her conversations with neonatology in [place], resuscitation is not required by state law 23 weeks.  She has reviewed neurologic outcomes and survival data with the parents.  They only wish for comfort care.  I discussed this with them personally.  They do not want to proceed with a  section for twin B given the poor prognosis.  For these reasons, we will proceed with plans for delivery in the labor and delivery room.  Formal ultrasound confirms vertex/breech presentation.  We will crossmatch for 2 units given twin pregnancy.  We will continue plan for IV antibiotics for maternal treatment of chorioamnionitis.  Anesthesia also consulted and informed.  They will proceed with regional anesthesia.  Once regional anesthesia is completed, pessary will be removed and delivery will begin.  Nursing staff also informed of this decision.  Uterotonic medicines will be available.    Mickey Pereira MD

## 2019-08-12 VITALS
TEMPERATURE: 98.3 F | OXYGEN SATURATION: 99 % | DIASTOLIC BLOOD PRESSURE: 57 MMHG | SYSTOLIC BLOOD PRESSURE: 103 MMHG | BODY MASS INDEX: 26.59 KG/M2 | WEIGHT: 169.4 LBS | RESPIRATION RATE: 18 BRPM | HEART RATE: 78 BPM | HEIGHT: 67 IN

## 2019-08-12 RX ORDER — ZOLPIDEM TARTRATE 5 MG/1
5 TABLET ORAL NIGHTLY PRN
Qty: 30 TABLET | Refills: 0 | Status: SHIPPED | OUTPATIENT
Start: 2019-08-12 | End: 2019-08-21

## 2019-08-12 NOTE — DISCHARGE SUMMARY
[facility] [place] [facility]    Mickey Pereira MD  [address]  [place] [address]  [phone]      Discharge Summary    [facility]  Corrine Conte  : 1990  MRN: [id number]  CSN: [id number]    Date of Admission: 2019   Date of Discharge:  2019   Delivering Physician:    Matilda Conte [[id number]]   Matilda Wolff [[id number]]   Mickey Pereira         Admission Diagnosis: 1.  labor [O60.00]   Discharge Diagnosis: 1. Pregnancy at 23w1d - delivered       Procedures:    Matilda Conte [[id number]]   2019     Matilda Conte [[id number]]   2019   -      Matilda Conte [[id number]]   Vaginal, Spontaneous     Matilda Conte [[id number]]   Vaginal, Breech        Hospital Course  Patient is a 29 y.o.  who at 23w1d had a vaginal birth of twins.  She had a history of cervical incompetence treated with a pessary, as well as rupture of membranes approximately 9 days prior to presentation in labor.  After extensive counseling, family opted for comfort care only.  Vaginal delivery occurred quickly after removal of pessary.  She did well and desired discharge home on postpartum day 1.  She did receive ampicillin and gentamicin at the time of delivery for treatment of chorioamnionitis.  She remained afebrile postpartum.  She was having no signs or symptoms of continued infection.  Infant     Matilda Conte [[id number]]   female     Matilda Conte [[id number]]   female   fetus weighing      Matilda Conte [[id number]]   480 g (1 lb 0.9 oz)     Matilda Conte [[id number]]   300 g (10.6 oz)    Apgars -       Matilda Conte [[id number]]   1      Matilda Conte [[id number]]   3   @ 1 minute /       Matilda Conte [[id number]]   1      Matilda Conte [[id number]]   1   @ 5 minutes.    Discharge labs  Lab Results   Component Value Date    WBC 15.99 (H) 2019    HGB  11.8 (L) 08/11/2019    HCT 33.1 (L) 08/11/2019     08/11/2019       Discharge Medications     Discharge Medications      New Medications      Instructions Start Date   zolpidem 5 MG tablet  Commonly known as:  AMBIEN   5 mg, Oral, Nightly PRN         Continue These Medications      Instructions Start Date   aspirin 81 MG chewable tablet   81 mg, Oral, Daily      FISH OIL PO   Oral      folic acid 1 MG tablet  Commonly known as:  FOLVITE   1 mg, Oral, Daily      PRENATAL FORMULA PO   Oral         Stop These Medications    amoxicillin 500 MG capsule  Commonly known as:  AMOXIL     azithromycin 250 MG tablet  Commonly known as:  ZITHROMAX     progesterone 100 MG capsule  Commonly known as:  PROMETRIUM            Discharge Disposition Home or Self Care   Condition on Discharge: good   Follow-up: 1 week with Sandra   Patient was encouraged to call for fevers, chills, sweats, increased bleeding, or increased pain.     Mickey Pereira MD  8/12/2019

## 2019-08-12 NOTE — NURSING NOTE
Off going nurse, Pierce, spoke with  Marianne to give update about patient.    Jaylene Rincon RN  11:49 AM

## 2019-08-12 NOTE — SIGNIFICANT NOTE
Dr. Pereira notified of pt's arrival.  Pt c/o sharp lower abd pain that moves to her back and vagina.  Pt has twin gestation, srom at 21 6/7 weeks, states she is having greenish vaginal discharge with blacks specs that started today.  States pain started yesterday and became worse today.  Pt states her pain is severe, very restless in bed. States she has been feeling her babies move today.

## 2019-08-12 NOTE — PROGRESS NOTES
"      Mickey Pereira MD  [facility]  [address]  [place] [address]  Office [phone]  Fax [phone]    [facility]  Vaginal Delivery Progress Note    Subjective   Postpartum Day 1: Vaginal Delivery    The patient feels well.  Her pain is well controlled with nonsteroidal anti-inflammatory drugs.   She is ambulating well.  Patient describes her bleeding as thin lochia.    Breastfeeding: Not applicable    Objective     Vital Signs Range for the last 24 hours  Temperature: Temp:  [97.7 °F (36.5 °C)-98.9 °F (37.2 °C)] 98.5 °F (36.9 °C)   Temp Source: Temp src: Temporal   BP: BP: ()/(49-89) 109/66   Pulse: Heart Rate:  [] 91   Respirations: Resp:  [18-20] 18   SPO2: SpO2:  [85 %-100 %] 99 %   O2 Amount (l/min):     O2 Devices Device (Oxygen Therapy): room air   Weight: Weight:  [76.8 kg (169 lb 6.4 oz)] 76.8 kg (169 lb 6.4 oz)     Admit Height:  Height: 170.2 cm (67\")      Physical Exam:  General:  no acute distresss.  Abdomen: abdomen is soft without significant tenderness, masses, organomegaly or guarding. Fundus: appropriate, firm, non tender  Extremities: normal, atraumatic, no cyanosis, and trace edema.       Lab results reviewed:  Yes   Rubella:  No results found for: RUBELLAIGGIN Nurse Transcribed from prenatal record --  No components found for: EXTRUBELQUAL  Rh Status:    RH type   Date Value Ref Range Status   08/11/2019 Positive  Final     Immunizations:   There is no immunization history on file for this patient.  Lab Results (last 24 hours)     Procedure Component Value Units Date/Time    CBC & Differential [[id number]] Collected:  08/11/19 1709    Specimen:  Blood Updated:  08/11/19 1719    Narrative:       The following orders were created for panel order CBC & Differential.  Procedure                               Abnormality         Status                     ---------                               -----------         ------                     CBC Auto " Differential[[id number]]        Abnormal            Final result                 Please view results for these tests on the individual orders.    CBC Auto Differential [[id number]]  (Abnormal) Collected:  19    Specimen:  Blood Updated:  19     WBC 15.99 10*3/mm3      RBC 3.59 10*6/mm3      Hemoglobin 11.8 g/dL      Hematocrit 33.1 %      MCV 92.2 fL      MCH 32.9 pg      MCHC 35.6 g/dL      RDW 13.0 %      RDW-SD 43.5 fl      MPV 9.8 fL      Platelets 163 10*3/mm3      Neutrophil % 83.1 %      Lymphocyte % 7.1 %      Monocyte % 7.8 %      Eosinophil % 0.9 %      Basophil % 0.3 %      Immature Grans % 0.8 %      Neutrophils, Absolute 13.29 10*3/mm3      Lymphocytes, Absolute 1.14 10*3/mm3      Monocytes, Absolute 1.25 10*3/mm3      Eosinophils, Absolute 0.15 10*3/mm3      Basophils, Absolute 0.04 10*3/mm3      Immature Grans, Absolute 0.12 10*3/mm3      nRBC 0.0 /100 WBC           Assessment/Plan        labor      Corrine Conte is Day 1  post-partum       Matilda Conte [[id number]]   Vaginal, Spontaneous     Matilda Conte [[id number]]   Vaginal, Breech        Matilda Conte [[id number]]         Matilda Conte [[id number]]   chorioamnionitis, prolonged premature rom, possible twin to twin transfusion  .      Plan:  Continue current care Discharge home with standard precautions and return to clinic in 4-6 weeks.      Mickey Pereira MD  2019  6:04 AM

## 2019-08-12 NOTE — NURSING NOTE
pt c/o sharp, low abd pain since yesterday that is worse today, pain moves into her back and is dull and down to her vagina.  states discharge today is green with black flecks, states she is feeling her babies move. States pain is a 9 on a 1-10 scale

## 2019-08-14 LAB
CYTO UR: NORMAL
LAB AP CASE REPORT: NORMAL
LAB AP CLINICAL INFORMATION: NORMAL
PATH REPORT.FINAL DX SPEC: NORMAL
PATH REPORT.GROSS SPEC: NORMAL

## 2019-08-14 NOTE — ANESTHESIA POSTPROCEDURE EVALUATION
Patient: Corrine Conte    Procedure Summary     Date:  08/11/19 Room / Location:      Anesthesia Start:  1814 Anesthesia Stop:  1836    Procedure:  LABOR ANALGESIA Diagnosis:      Scheduled Providers:   Provider:  Danae Myers CRNA    Anesthesia Type:  CSE ASA Status:  2          Anesthesia Type: CSE  Last vitals  BP   103/57 (08/12/19 0530)   Temp   98.3 °F (36.8 °C) (08/12/19 0530)   Pulse   78 (08/12/19 0530)   Resp   18 (08/12/19 0530)     SpO2   99 % (08/11/19 1831)     Post Anesthesia Care and Evaluation    Anesthetic complications: No anesthetic complications  Post Neuraxial Block status: Motor and sensory function returned to baseline and No signs or symptoms of PDPH

## 2019-08-15 LAB
ABO + RH BLD: NORMAL
ABO + RH BLD: NORMAL
BH BB BLOOD EXPIRATION DATE: NORMAL
BH BB BLOOD EXPIRATION DATE: NORMAL
BH BB BLOOD TYPE BARCODE: 6200
BH BB BLOOD TYPE BARCODE: 6200
BH BB DISPENSE STATUS: NORMAL
BH BB DISPENSE STATUS: NORMAL
BH BB PRODUCT CODE: NORMAL
BH BB PRODUCT CODE: NORMAL
BH BB UNIT NUMBER: NORMAL
BH BB UNIT NUMBER: NORMAL
UNIT  ABO: NORMAL
UNIT  ABO: NORMAL
UNIT  RH: NORMAL
UNIT  RH: NORMAL

## 2019-08-19 ENCOUNTER — OFFICE VISIT (OUTPATIENT)
Dept: OBSTETRICS AND GYNECOLOGY | Facility: CLINIC | Age: 29
End: 2019-08-19

## 2019-08-19 VITALS
WEIGHT: 154 LBS | SYSTOLIC BLOOD PRESSURE: 100 MMHG | DIASTOLIC BLOOD PRESSURE: 74 MMHG | HEIGHT: 67 IN | BODY MASS INDEX: 24.17 KG/M2

## 2019-08-19 PROBLEM — O30.002 TWIN GESTATION IN SECOND TRIMESTER: Status: RESOLVED | Noted: 2019-08-02 | Resolved: 2019-08-19

## 2019-08-19 PROBLEM — Z34.90 PREGNANCY: Status: RESOLVED | Noted: 2019-08-02 | Resolved: 2019-08-19

## 2019-08-19 PROBLEM — O60.00 PRETERM LABOR: Status: RESOLVED | Noted: 2019-08-11 | Resolved: 2019-08-19

## 2019-08-19 PROCEDURE — 99213 OFFICE O/P EST LOW 20 MIN: CPT | Performed by: OBSTETRICS & GYNECOLOGY

## 2019-08-19 NOTE — PROGRESS NOTES
"Corrine Conte is a 29 y.o. female here today for short-term follow-up after a previable  twin delivery due to premature rupture of membranes and acute chorioamnionitis.  Since her discharge from the hospital she denies fevers, abdominal pain, or heavy bleeding.  She denies malodorous bleeding.    Visit Vitals  /74 (BP Location: Left arm, Patient Position: Sitting)   Ht 170.2 cm (67\")   Wt 69.9 kg (154 lb)   BMI 24.12 kg/m²     Pleasant female no acute distress  Mood and affect normal  Breathing unlabored  Abdomen is soft and nontender, the uterine fundus is nontender    Assessment: Previable  delivery complicated by chorioamnionitis    There is no sign of persistent endometritis at today's visit.  She will call the office if she has any fevers, significant pain, or heavy vaginal bleeding.  She will follow-up in 1 month for a postpartum exam, and call the meantime if she has any questions or concerns.  Her mood is appropriate at today's visit and she has good family support.    "

## 2019-08-23 ENCOUNTER — TELEPHONE (OUTPATIENT)
Dept: OTHER | Facility: HOSPITAL | Age: 29
End: 2019-08-23

## 2019-08-23 NOTE — TELEPHONE ENCOUNTER
Bereavement Note    Infant twin girls  Date of demise:  8/11/19    Attempted to reach pt to provide emotional support, check on how she is coping with loss. No answer. Did not leave voice mail.

## 2019-09-16 ENCOUNTER — POSTPARTUM VISIT (OUTPATIENT)
Dept: OBSTETRICS AND GYNECOLOGY | Facility: CLINIC | Age: 29
End: 2019-09-16

## 2019-09-16 VITALS
DIASTOLIC BLOOD PRESSURE: 74 MMHG | HEIGHT: 67 IN | BODY MASS INDEX: 24.8 KG/M2 | WEIGHT: 158 LBS | SYSTOLIC BLOOD PRESSURE: 110 MMHG

## 2019-09-16 PROCEDURE — 0503F POSTPARTUM CARE VISIT: CPT | Performed by: OBSTETRICS & GYNECOLOGY

## 2019-09-16 NOTE — PROGRESS NOTES
"Corrine Conte is here for a postpartum visit after a previable twin vaginal delivery 5 weeks ago with chorioamnionitis after rupture of membranes.  The depression questionnaire has been completed.  She has no signs of significant postpartum depression today.  She has not had a period since her delivery.  Her last Pap smear was 2019 and normal.  She has no history of cervical dysplasia.  She declines contraception.    /74 (BP Location: Left arm, Patient Position: Sitting)   Ht 170.2 cm (67\")   Wt 71.7 kg (158 lb)   BMI 24.75 kg/m²    In general pleasant female no acute distress  Neck no thyromegaly  Breasts without erythema tenderness or masses  Abdomen soft and nontender  A Pap smear was not performed.     Assessment: Normal postpartum exam.    We have discussed current Pap smear screening guidelines. She will call if she desires to start hormonal contraception. We discussed an inter-pregnancy interval of at least 6 months as far as risk of  delivery. We also plan MFM referral in the first trimester if not pre-conception.  Corrine will return in 1 year or sooner if needed.    "

## 2019-10-03 ENCOUNTER — TELEPHONE (OUTPATIENT)
Dept: OBSTETRICS AND GYNECOLOGY | Facility: CLINIC | Age: 29
End: 2019-10-03

## 2019-10-03 NOTE — TELEPHONE ENCOUNTER
Pt calls stating she thinks she has yeast infection:  C/o burning, itching, tenderness.  Req to be seen.  Scheduling notified

## 2019-10-04 ENCOUNTER — OFFICE VISIT (OUTPATIENT)
Dept: OBSTETRICS AND GYNECOLOGY | Facility: CLINIC | Age: 29
End: 2019-10-04

## 2019-10-04 VITALS
BODY MASS INDEX: 24.96 KG/M2 | HEIGHT: 67 IN | DIASTOLIC BLOOD PRESSURE: 66 MMHG | SYSTOLIC BLOOD PRESSURE: 128 MMHG | WEIGHT: 159 LBS

## 2019-10-04 DIAGNOSIS — Z78.9 NON-SMOKER: ICD-10-CM

## 2019-10-04 DIAGNOSIS — B37.31 YEAST VAGINITIS: Primary | ICD-10-CM

## 2019-10-04 PROCEDURE — 99213 OFFICE O/P EST LOW 20 MIN: CPT | Performed by: OBSTETRICS & GYNECOLOGY

## 2019-10-04 RX ORDER — FLUCONAZOLE 150 MG/1
150 TABLET ORAL ONCE
Qty: 1 TABLET | Refills: 3 | Status: SHIPPED | OUTPATIENT
Start: 2019-10-04 | End: 2019-10-04

## 2019-10-04 NOTE — PROGRESS NOTES
Subjective   Corrine Conte is a 29 y.o. female is here today for follow-up.    Patient presents today for yeast infection symptoms.    History of Present Illness     Patient is a 2-day history of vaginal discharge, itching, and vaginal burning.  She has no urinary symptoms.  She has not tried anything over-the-counter.    The following portions of the patient's history were reviewed and updated as appropriate: allergies, current medications, past family history, past medical history, past social history, past surgical history and problem list.    Review of Systems   Constitutional: Negative for chills and fever.   HENT: Negative for sore throat.    Gastrointestinal: Negative for abdominal pain, constipation, diarrhea, nausea and vomiting.   Genitourinary: Positive for vaginal discharge. Negative for dysuria, flank pain, frequency, hematuria, pelvic pain and urgency.   Musculoskeletal: Negative for back pain.   Skin: Negative for rash.   Neurological: Negative for headaches.   All other systems reviewed and are negative.      Objective   Physical Exam   Constitutional: She is oriented to person, place, and time. She appears well-developed and well-nourished.   HENT:   Head: Normocephalic and atraumatic.   Abdominal: Soft. Bowel sounds are normal.   Genitourinary:   Genitourinary Comments: Scant blood consistent with tapering lochia.  External erythema and discharge consistent with yeast infection.   Neurological: She is alert and oriented to person, place, and time.   Skin: Skin is warm and dry.   Psychiatric: She has a normal mood and affect. Her behavior is normal. Judgment and thought content normal.   Nursing note and vitals reviewed.        Assessment/Plan   Corrine was seen today for vaginal itching.    Diagnoses and all orders for this visit:    Yeast vaginitis  -     fluconazole (DIFLUCAN) 150 MG tablet; Take 1 tablet by mouth 1 (One) Time for 1 dose.    Non-smoker      Will call if symptoms do not  resolve.  Call for concerns or questions.  Return office as needed.    Mickey Pereira MD

## 2020-05-09 ENCOUNTER — DOCUMENTATION (OUTPATIENT)
Dept: OBSTETRICS AND GYNECOLOGY | Facility: CLINIC | Age: 30
End: 2020-05-09

## 2020-05-09 ENCOUNTER — HOSPITAL ENCOUNTER (EMERGENCY)
Facility: HOSPITAL | Age: 30
Discharge: HOME | End: 2020-05-09
Admitting: EMERGENCY MEDICINE

## 2020-05-09 ENCOUNTER — APPOINTMENT (OUTPATIENT)
Dept: ULTRASOUND IMAGING | Facility: HOSPITAL | Age: 30
End: 2020-05-09

## 2020-05-09 VITALS
DIASTOLIC BLOOD PRESSURE: 77 MMHG | TEMPERATURE: 98 F | BODY MASS INDEX: 25.11 KG/M2 | RESPIRATION RATE: 14 BRPM | HEART RATE: 82 BPM | OXYGEN SATURATION: 99 % | WEIGHT: 160 LBS | SYSTOLIC BLOOD PRESSURE: 125 MMHG | HEIGHT: 67 IN

## 2020-05-09 DIAGNOSIS — O20.9 VAGINAL BLEEDING AFFECTING EARLY PREGNANCY: Primary | ICD-10-CM

## 2020-05-09 LAB
ABO GROUP BLD: NORMAL
ALBUMIN SERPL-MCNC: 4.9 G/DL (ref 3.5–5.2)
ALBUMIN/GLOB SERPL: 2.5 G/DL
ALP SERPL-CCNC: 29 U/L (ref 39–117)
ALT SERPL W P-5'-P-CCNC: 10 U/L (ref 1–33)
ANION GAP SERPL CALCULATED.3IONS-SCNC: 15 MMOL/L (ref 5–15)
AST SERPL-CCNC: 18 U/L (ref 1–32)
BACTERIA UR QL AUTO: ABNORMAL /HPF
BASOPHILS # BLD AUTO: 0.01 10*3/MM3 (ref 0–0.2)
BASOPHILS NFR BLD AUTO: 0.2 % (ref 0–1.5)
BILIRUB SERPL-MCNC: 0.3 MG/DL (ref 0.2–1.2)
BILIRUB UR QL STRIP: NEGATIVE
BLD GP AB SCN SERPL QL: NEGATIVE
BUN BLD-MCNC: 9 MG/DL (ref 6–20)
BUN/CREAT SERPL: 15.8 (ref 7–25)
CALCIUM SPEC-SCNC: 9.3 MG/DL (ref 8.6–10.5)
CHLORIDE SERPL-SCNC: 103 MMOL/L (ref 98–107)
CLARITY UR: CLEAR
CO2 SERPL-SCNC: 21 MMOL/L (ref 22–29)
COLOR UR: YELLOW
CREAT BLD-MCNC: 0.57 MG/DL (ref 0.57–1)
DEPRECATED RDW RBC AUTO: 42 FL (ref 37–54)
EOSINOPHIL # BLD AUTO: 0.16 10*3/MM3 (ref 0–0.4)
EOSINOPHIL NFR BLD AUTO: 3.3 % (ref 0.3–6.2)
ERYTHROCYTE [DISTWIDTH] IN BLOOD BY AUTOMATED COUNT: 12.5 % (ref 12.3–15.4)
GFR SERPL CREATININE-BSD FRML MDRD: 124 ML/MIN/1.73
GLOBULIN UR ELPH-MCNC: 2 GM/DL
GLUCOSE BLD-MCNC: 110 MG/DL (ref 65–99)
GLUCOSE UR STRIP-MCNC: NEGATIVE MG/DL
HCG INTACT+B SERPL-ACNC: 10.44 MIU/ML
HCT VFR BLD AUTO: 37.8 % (ref 34–46.6)
HGB BLD-MCNC: 12.9 G/DL (ref 12–15.9)
HGB UR QL STRIP.AUTO: ABNORMAL
HOLD SPECIMEN: NORMAL
HOLD SPECIMEN: NORMAL
HYALINE CASTS UR QL AUTO: ABNORMAL /LPF
IMM GRANULOCYTES # BLD AUTO: 0.01 10*3/MM3 (ref 0–0.05)
IMM GRANULOCYTES NFR BLD AUTO: 0.2 % (ref 0–0.5)
KETONES UR QL STRIP: NEGATIVE
LEUKOCYTE ESTERASE UR QL STRIP.AUTO: NEGATIVE
LYMPHOCYTES # BLD AUTO: 1.43 10*3/MM3 (ref 0.7–3.1)
LYMPHOCYTES NFR BLD AUTO: 29.2 % (ref 19.6–45.3)
MCH RBC QN AUTO: 31.5 PG (ref 26.6–33)
MCHC RBC AUTO-ENTMCNC: 34.1 G/DL (ref 31.5–35.7)
MCV RBC AUTO: 92.2 FL (ref 79–97)
MONOCYTES # BLD AUTO: 0.38 10*3/MM3 (ref 0.1–0.9)
MONOCYTES NFR BLD AUTO: 7.8 % (ref 5–12)
NEUTROPHILS # BLD AUTO: 2.91 10*3/MM3 (ref 1.7–7)
NEUTROPHILS NFR BLD AUTO: 59.3 % (ref 42.7–76)
NITRITE UR QL STRIP: NEGATIVE
NRBC BLD AUTO-RTO: 0 /100 WBC (ref 0–0.2)
NUMBER OF DOSES: NORMAL
PH UR STRIP.AUTO: 6 [PH] (ref 5–8)
PLATELET # BLD AUTO: 153 10*3/MM3 (ref 140–450)
PMV BLD AUTO: 11 FL (ref 6–12)
POTASSIUM BLD-SCNC: 3.6 MMOL/L (ref 3.5–5.2)
PROT SERPL-MCNC: 6.9 G/DL (ref 6–8.5)
PROT UR QL STRIP: NEGATIVE
RBC # BLD AUTO: 4.1 10*6/MM3 (ref 3.77–5.28)
RBC # UR: ABNORMAL /HPF
REF LAB TEST METHOD: ABNORMAL
RH BLD: POSITIVE
SODIUM BLD-SCNC: 139 MMOL/L (ref 136–145)
SP GR UR STRIP: <=1.005 (ref 1–1.03)
SQUAMOUS #/AREA URNS HPF: ABNORMAL /HPF
UROBILINOGEN UR QL STRIP: ABNORMAL
WBC NRBC COR # BLD: 4.9 10*3/MM3 (ref 3.4–10.8)
WBC UR QL AUTO: ABNORMAL /HPF
WHOLE BLOOD HOLD SPECIMEN: NORMAL
WHOLE BLOOD HOLD SPECIMEN: NORMAL

## 2020-05-09 PROCEDURE — 86900 BLOOD TYPING SEROLOGIC ABO: CPT | Performed by: NURSE PRACTITIONER

## 2020-05-09 PROCEDURE — 87086 URINE CULTURE/COLONY COUNT: CPT | Performed by: NURSE PRACTITIONER

## 2020-05-09 PROCEDURE — 81001 URINALYSIS AUTO W/SCOPE: CPT | Performed by: NURSE PRACTITIONER

## 2020-05-09 PROCEDURE — 84702 CHORIONIC GONADOTROPIN TEST: CPT | Performed by: NURSE PRACTITIONER

## 2020-05-09 PROCEDURE — 86901 BLOOD TYPING SEROLOGIC RH(D): CPT | Performed by: NURSE PRACTITIONER

## 2020-05-09 PROCEDURE — 80053 COMPREHEN METABOLIC PANEL: CPT | Performed by: NURSE PRACTITIONER

## 2020-05-09 PROCEDURE — 85025 COMPLETE CBC W/AUTO DIFF WBC: CPT | Performed by: NURSE PRACTITIONER

## 2020-05-09 PROCEDURE — 86850 RBC ANTIBODY SCREEN: CPT | Performed by: NURSE PRACTITIONER

## 2020-05-09 PROCEDURE — 76817 TRANSVAGINAL US OBSTETRIC: CPT

## 2020-05-09 PROCEDURE — 99284 EMERGENCY DEPT VISIT MOD MDM: CPT

## 2020-05-09 RX ADMIN — SODIUM CHLORIDE 500 ML: 9 INJECTION, SOLUTION INTRAVENOUS at 12:06

## 2020-05-09 NOTE — ED NOTES
Patient is a 30 year old female that presents to ER with complaints of vaginal bleeding during pregnancy. Patient reports that she had a sudden onset of painless bleeding this afternoon around 1100. Patient reports that she went to the restroom she noticed bright red blood when wiping. Patient states that she approximately 5.5 weeks pregnant at this time. Patient has enhanced concern due to the loss of twins whom were born premature in Aug. Patient denies any abdominal pain or discomfort at this time. Patient reports that bleeding has been less than or equal to one maxi pad at this time.      Cornelio Augustine, RN  05/09/20 1026

## 2020-05-09 NOTE — ED PROVIDER NOTES
Subjective   Patient is a 31yo white female presents to emergency dept with vaginal bleeding that started about 1 hour ago after lifting some boxes at work. She states that she is about 5 weeks pregnant. She states that she has not seen dr valencia yet and she has not had ultrasound. She states that she miscarried twins last year as well. She states that she had gone to the bathroom while she was at work and noticed when she wiped that there was blood on the tissue but none in the toilet. She states that she became very frightened when she saw the blood. She denies fever or chills. No nausea or vomiting. No fever or chills. She is A1      History provided by:  Patient   used: No        Review of Systems   Constitutional: Negative.    HENT: Negative.    Eyes: Negative.    Respiratory: Negative.    Cardiovascular: Negative.    Gastrointestinal: Negative.    Endocrine: Negative.    Genitourinary:        Patient is a 31yo white female presents to emergency dept with vaginal bleeding that started about 1 hour ago after lifting some boxes at work. She states that she is about 5 weeks pregnant. She states that she has not seen dr valencia yet and she has not had ultrasound. She states that she miscarried twins last year as well. She states that she had gone to the bathroom while she was at work and noticed when she wiped that there was blood on the tissue but none in the toilet. She states that she became very frightened when she saw the blood. She denies fever or chills. No nausea or vomiting. No fever or chills. She is A1     Musculoskeletal: Negative.    Skin: Negative.    Allergic/Immunologic: Negative.    Neurological: Negative.    Hematological: Negative.    Psychiatric/Behavioral: Negative.    All other systems reviewed and are negative.      Past Medical History:   Diagnosis Date   • Family history of polyps in the colon    • PONV (postoperative nausea and vomiting)        Allergies  "  Allergen Reactions   • Diphenhydramine Rash   • Sulfa Antibiotics Rash       Past Surgical History:   Procedure Laterality Date   • ADENOIDECTOMY     • COLONOSCOPY N/A 3/14/2017    Procedure: COLONOSCOPY WITH ANESTHESIA;  Surgeon: Tony Farias DO;  Location: [facility] ENDOSCOPY;  Service:    • LEEP  2010   • TONSILLECTOMY         Family History   Problem Relation Age of Onset   • Colon polyps Mother 19        MULTIPLE POLYPS   • Hypertension Father    • Diabetes Father         PRE-DIABETES   • Obesity Father    • Hypertension Brother    • Breast cancer Maternal Grandmother         POST-MENOPAUSE   • Heart disease Maternal Grandfather    • Diabetes Paternal Grandfather    • Heart disease Paternal Grandfather 56        BYPASS AND VALVE REPLACEMENT   • Colon cancer Neg Hx    • Rectal cancer Neg Hx    • Esophageal cancer Neg Hx    • Liver cancer Neg Hx    • Liver disease Neg Hx    • Stomach cancer Neg Hx        Social History     Socioeconomic History   • Marital status:      Spouse name: Not on file   • Number of children: Not on file   • Years of education: Not on file   • Highest education level: Not on file   Tobacco Use   • Smoking status: Never Smoker   • Smokeless tobacco: Never Used   Substance and Sexual Activity   • Alcohol use: No     Frequency: Never   • Drug use: No   • Sexual activity: Yes     Partners: Male     Comment:        Prior to Admission medications    Medication Sig Start Date End Date Taking? Authorizing Provider   Prenatal Vit-Fe Fumarate-FA (PRENATAL FORMULA PO) Take  by mouth.    Provider, MD Justin       /77   Pulse 82   Temp 98 °F (36.7 °C) (Oral)   Resp 14   Ht 170.2 cm (67\")   Wt 72.6 kg (160 lb)   SpO2 99%   BMI 25.06 kg/m²     Objective   Physical Exam   Constitutional: She is oriented to person, place, and time. She appears well-developed and well-nourished.   Very anxious. Non toxic appearing    HENT:   Head: Normocephalic and atraumatic.   Eyes: " Pupils are equal, round, and reactive to light. Conjunctivae and EOM are normal.   Neck: Normal range of motion. Neck supple. No tracheal deviation present. No thyromegaly present.   Cardiovascular: Normal rate, regular rhythm, normal heart sounds and intact distal pulses.   Pulmonary/Chest: Effort normal and breath sounds normal. No respiratory distress. She has no wheezes. She has no rales. She exhibits no tenderness.   Abdominal: Soft. Bowel sounds are normal.   Musculoskeletal: Normal range of motion.   Neurological: She is alert and oriented to person, place, and time. She has normal reflexes. No cranial nerve deficit.   Skin: Skin is warm and dry.   Psychiatric: She has a normal mood and affect. Her behavior is normal. Judgment and thought content normal.   Nursing note and vitals reviewed.      Procedures         Lab Results (last 24 hours)     Procedure Component Value Units Date/Time    CBC & Differential [[id number]] Collected:  05/09/20 1205    Specimen:  Blood Updated:  05/09/20 1222    Narrative:       The following orders were created for panel order CBC & Differential.  Procedure                               Abnormality         Status                     ---------                               -----------         ------                     CBC Auto Differential[[id number]]        Normal              Final result                 Please view results for these tests on the individual orders.    Comprehensive Metabolic Panel [[id number]]  (Abnormal) Collected:  05/09/20 1205    Specimen:  Blood Updated:  05/09/20 1259     Glucose 110 mg/dL      BUN 9 mg/dL      Creatinine 0.57 mg/dL      Sodium 139 mmol/L      Potassium 3.6 mmol/L      Chloride 103 mmol/L      CO2 21.0 mmol/L      Calcium 9.3 mg/dL      Total Protein 6.9 g/dL      Albumin 4.90 g/dL      ALT (SGPT) 10 U/L      AST (SGOT) 18 U/L      Comment: Specimen hemolyzed.  Results may be affected.        Alkaline Phosphatase 29 U/L      Total Bilirubin  0.3 mg/dL      eGFR Non African Amer 124 mL/min/1.73      Globulin 2.0 gm/dL      A/G Ratio 2.5 g/dL      BUN/Creatinine Ratio 15.8     Anion Gap 15.0 mmol/L     Narrative:       GFR Normal >60  Chronic Kidney Disease <60  Kidney Failure <15      hCG, Quantitative, Pregnancy [[id number]] Collected:  05/09/20 1205    Specimen:  Blood Updated:  05/09/20 1246     HCG Quantitative 10.44 mIU/mL     Narrative:       HCG Ranges by Gestational Age    Females - non-pregnant premenopausal   </= 1mIU/mL HCG  Females - postmenopausal               </= 7mIU/mL HCG    3 Weeks         5.8 -    71.2 mIU/mL  4 Weeks         9.5 -     750 mIU/mL  5 Weeks         217 -   7,138 mIU/mL  6 Weeks         158 -  31,795 mIU/mL  7 Weeks       3,697 - 163,563 mIU/mL  8 Weeks      32,065 - 149,571 mIU/mL  9 Weeks      63,803 - 151,410 mIU/mL  10 Weeks     46,509 - 186,977 mIU/mL  12 Weeks     27,832 - 210,612 mIU/mL  14 Weeks     13,950 -  62,530 mIU/mL  15 Weeks     12,039 -  70,971 mIU/mL  16 Weeks      9,040 -  56,451 mIU/mL  17 Weeks      8,175 -  55,868 mIU/mL  18 Weeks      8,099 -  58,176 mIU/mL  Results may be falsely decreased if patient taking Biotin.      CBC Auto Differential [[id number]]  (Normal) Collected:  05/09/20 1205    Specimen:  Blood Updated:  05/09/20 1222     WBC 4.90 10*3/mm3      RBC 4.10 10*6/mm3      Hemoglobin 12.9 g/dL      Hematocrit 37.8 %      MCV 92.2 fL      MCH 31.5 pg      MCHC 34.1 g/dL      RDW 12.5 %      RDW-SD 42.0 fl      MPV 11.0 fL      Platelets 153 10*3/mm3      Neutrophil % 59.3 %      Lymphocyte % 29.2 %      Monocyte % 7.8 %      Eosinophil % 3.3 %      Basophil % 0.2 %      Immature Grans % 0.2 %      Neutrophils, Absolute 2.91 10*3/mm3      Lymphocytes, Absolute 1.43 10*3/mm3      Monocytes, Absolute 0.38 10*3/mm3      Eosinophils, Absolute 0.16 10*3/mm3      Basophils, Absolute 0.01 10*3/mm3      Immature Grans, Absolute 0.01 10*3/mm3      nRBC 0.0 /100 WBC     Urinalysis With Culture If  Indicated - Urine, Clean Catch [[id number]]  (Abnormal) Collected:  05/09/20 1210    Specimen:  Urine, Clean Catch Updated:  05/09/20 1242     Color, UA Yellow     Appearance, UA Clear     pH, UA 6.0     Specific Gravity, UA <=1.005     Glucose, UA Negative     Ketones, UA Negative     Bilirubin, UA Negative     Blood, UA Moderate (2+)     Protein, UA Negative     Leuk Esterase, UA Negative     Nitrite, UA Negative     Urobilinogen, UA 0.2 E.U./dL    Urine Culture - Urine, Urine, Clean Catch [[id number]] Collected:  05/09/20 1210    Specimen:  Urine, Clean Catch Updated:  05/09/20 1239    Urinalysis, Microscopic Only - Urine, Clean Catch [[id number]]  (Abnormal) Collected:  05/09/20 1210    Specimen:  Urine, Clean Catch Updated:  05/09/20 1242     RBC, UA 0-2 /HPF      WBC, UA None Seen /HPF      Bacteria, UA None Seen /HPF      Squamous Epithelial Cells, UA None Seen /HPF      Hyaline Casts, UA None Seen /LPF      Methodology Automated Microscopy          US Ob Transvaginal   Final Result   1. No gestational sac is identified in the uterus. Ectopic pregnancy is   not entirely excluded with this exam. Recommend monitoring of hCG   levels.   2. The ovaries are sonographically normal. There is no evidence of   adnexal mass.   3. Trace amount of free fluid in the pelvis is nonspecific.   This report was finalized on 05/09/2020 13:01 by Dr. Bobby Darling MD.          ED Course  ED Course as of May 09 1517   Sat May 09, 2020   1309 Reviewed results of testing with pt - advised that hcg is 10. Advised there was no evidence of iup on ultrasound. Advised pt that she needs to follow up with dr valencia on Monday and would need to repeat hcg. Advised to return if symptoms worsen. Advised pelvic rest for next 3 days.     [CW]      ED Course User Index  [CW] Teri Darling, NAYAN          MDM  Number of Diagnoses or Management Options  Vaginal bleeding affecting early pregnancy: minor     Amount and/or Complexity of Data  Reviewed  Clinical lab tests: ordered and reviewed  Tests in the radiology section of CPT®: ordered and reviewed    Patient Progress  Patient progress: stable      Final diagnoses:   Vaginal bleeding affecting early pregnancy          Teri Darling, APRN  05/09/20 1512

## 2020-05-09 NOTE — DISCHARGE INSTRUCTIONS
Return to ER if symptoms worsen   Pelvic rest for 3 days     Vaginal Bleeding During Pregnancy, First Trimester    A small amount of bleeding (spotting) from the vagina is common during early pregnancy. Sometimes the bleeding is normal and does not cause problems. At other times, though, bleeding may be a sign of something serious. Tell your doctor about any bleeding from your vagina right away.  Follow these instructions at home:  Activity  · Follow your doctor's instructions about how active you can be.  · If needed, make plans for someone to help with your normal activities.  · Do not have sex or orgasms until your doctor says that this is safe.  General instructions  · Take over-the-counter and prescription medicines only as told by your doctor.  · Watch your condition for any changes.  · Write down:  ? The number of pads you use each day.  ? How often you change pads.  ? How soaked (saturated) your pads are.  · Do not use tampons.  · Do not douche.  · If you pass any tissue from your vagina, save it to show to your doctor.  · Keep all follow-up visits as told by your doctor. This is important.  Contact a doctor if:  · You have vaginal bleeding at any time while you are pregnant.  · You have cramps.  · You have a fever.  Get help right away if:  · You have very bad cramps in your back or belly (abdomen).  · You pass large clots or a lot of tissue from your vagina.  · Your bleeding gets worse.  · You feel light-headed.  · You feel weak.  · You pass out (faint).  · You have chills.  · You are leaking fluid from your vagina.  · You have a gush of fluid from your vagina.  Summary  · Sometimes vaginal bleeding during pregnancy is normal and does not cause problems. At other times, bleeding may be a sign of something serious.  · Tell your doctor about any bleeding from your vagina right away.  · Follow your doctor's instructions about how active you can be. You may need someone to help you with your normal  activities.  This information is not intended to replace advice given to you by your health care provider. Make sure you discuss any questions you have with your health care provider.  Document Released: 2015 Document Revised: 2018 Document Reviewed: 2018  [employer] Interactive Patient Education ©  [employer]      Activity Restriction During Pregnancy  Your health care provider may recommend specific activity restrictions during pregnancy for a variety of reasons. Activity restriction may require that you limit activities that require great effort, such as exercise, lifting, or sex.  The type of activity restriction will vary for each person, depending on your risk or the problems you are having. Activity restriction may be recommended for a period of time until your baby is delivered.  Why are activity restrictions recommended?  Activity restriction may be recommended if:  · Your placenta is partially or completely covering the opening of your cervix (placenta previa).  · There is bleeding between the wall of the uterus and the amniotic sac in the first trimester of pregnancy (subchorionic hemorrhage).  · You went into labor too early ( labor).  · You have a history of miscarriage.  · You have a condition that causes high blood pressure during pregnancy (preeclampsia or eclampsia).  · You are pregnant with more than one baby.  · Your baby is not growing well.  What are the risks?  The risks depend on your specific restriction. Strict bed rest has the most physical and emotional risks and is no longer routinely recommended. Risks of strict bed rest include:  · Loss of muscle conditioning from not moving.  · Blood clots.  · Social isolation.  · Depression.  · Loss of income.  Talk with your health care team about activity restriction to decide if it is best for you and your baby. Even if you are having problems during your pregnancy, you may be able to continue with normal levels of  activity with careful monitoring by your health care team.  Follow these instructions at home:  If needed, based on your overall health and the health of your baby, your health care provider will decide which type of activity restriction is right for you. Activity restrictions may include:  · Not lifting anything heavier than 10 pounds (4.5 kg).  · Avoiding activities that take a lot of physical effort.  · No lifting or straining.  · Resting in a sitting position or lying down for periods of time during the day.  Pelvic rest may be recommended along with activity restrictions. If pelvic rest is recommended, then:  · Do not have sex, an orgasm, or use sexual stimulation.  · Do not use tampons. Do not douche. Do not put anything into your vagina.  · Do not lift anything that is heavier than 10 lb (4.5 kg).  · Avoid activities that require a lot of effort.  · Avoid any activity in which your pelvic muscles could become strained, such as squatting.  Questions to ask your health care provider  · Why is my activity being limited?  · How will activity restrictions affect my body?  · Why is rest helpful for me and my baby?  · What activities can I do?  · When can I return to normal activities?  When should I seek immediate medical care?  Seek immediate medical care if you have:  · Vaginal bleeding.  · Vaginal discharge.  · Cramping pain in your lower abdomen.  · Regular contractions.  · A low, dull backache.  Summary  · Your health care provider may recommend specific activity restrictions during pregnancy for a variety of reasons.  · Activity restriction may require that you limit activities such as exercise, lifting, sex, or any other activity that requires great effort.  · Discuss the risks and benefits of activity restriction with your health care team to decide if it is best for you and your baby.  · Contact your health care provider right away if you think you are having contractions, or if you notice vaginal  bleeding, discharge, or cramping.  This information is not intended to replace advice given to you by your health care provider. Make sure you discuss any questions you have with your health care provider.  Document Released: 04/13/2012 Document Revised: 04/09/2019 Document Reviewed: 04/09/2019  [employer] Interactive Patient Education © 2020 [employer]

## 2020-05-11 ENCOUNTER — OFFICE VISIT (OUTPATIENT)
Dept: OBSTETRICS AND GYNECOLOGY | Facility: CLINIC | Age: 30
End: 2020-05-11

## 2020-05-11 VITALS
DIASTOLIC BLOOD PRESSURE: 74 MMHG | WEIGHT: 161 LBS | SYSTOLIC BLOOD PRESSURE: 98 MMHG | BODY MASS INDEX: 25.27 KG/M2 | HEIGHT: 67 IN

## 2020-05-11 DIAGNOSIS — O03.9 SPONTANEOUS ABORTION: Primary | ICD-10-CM

## 2020-05-11 LAB — BACTERIA SPEC AEROBE CULT: NO GROWTH

## 2020-05-11 PROCEDURE — 99213 OFFICE O/P EST LOW 20 MIN: CPT | Performed by: OBSTETRICS & GYNECOLOGY

## 2020-05-11 NOTE — PROGRESS NOTES
"Corrine Conte is a 30 y.o. female here today for follow-up of a spontaneous .  On May 2 she had a positive home urine pregnancy test.  2 days ago she had vaginal bleeding which was fairly heavy, prompting her to go to the emergency room.  Evaluation in the emergency room showed a normal pelvic ultrasound and a quantitative hCG level of 10.  The bleeding was fairly heavy yesterday, but is light today.  She had some mild cramping yesterday but is pain-free today.  She has a history of previable rupture of membranes with twins.    Visit Vitals  BP 98/74 (BP Location: Left arm, Patient Position: Sitting)   Ht 170.2 cm (67\")   Wt 73 kg (161 lb)   LMP 2020   BMI 25.22 kg/m²      Pleasant female no acute distress  Mood and affect normal  Breathing unlabored    Assessment: Spontaneous     We talked about following hCG levels down to 0, but with a level of 10 2 days ago after a positive urine pregnancy test over a week ago, we feel like the levels are already significantly decreasing.  She declines an hCG level today, and is interested in trying to get pregnant again in the near future.  We discussed that there is no waiting period before trying to get pregnant again.  She will continue her prenatal vitamin, and perhaps add a folic acid supplement.  She will contact the office if she has any significant bleeding or begins having abdominal pain.  Otherwise she will follow-up as needed.  "

## 2020-08-06 ENCOUNTER — INITIAL PRENATAL (OUTPATIENT)
Dept: OBSTETRICS AND GYNECOLOGY | Facility: CLINIC | Age: 30
End: 2020-08-06

## 2020-08-06 VITALS — DIASTOLIC BLOOD PRESSURE: 68 MMHG | BODY MASS INDEX: 24.28 KG/M2 | SYSTOLIC BLOOD PRESSURE: 104 MMHG | WEIGHT: 155 LBS

## 2020-08-06 DIAGNOSIS — O09.891 HISTORY OF PRETERM DELIVERY, CURRENTLY PREGNANT, FIRST TRIMESTER: Primary | ICD-10-CM

## 2020-08-06 PROCEDURE — 0501F PRENATAL FLOW SHEET: CPT | Performed by: OBSTETRICS & GYNECOLOGY

## 2020-08-06 NOTE — PROGRESS NOTES
New OB, US today shows 7 weeks gestation, EDC 3/25/21  Prior twin pregnancy with previable delivery at 23 weeks after PPROM  Having nausea, fatigue, but no headaches  New OB labs today  Discussed OTC Unisom and B6  Discussed normal prenatal routines  Plan referral to MFM at next visit  Questions answered

## 2020-08-10 LAB
ABO GROUP BLD: NORMAL
BACTERIA UR CULT: NO GROWTH
BACTERIA UR CULT: NORMAL
BASOPHILS # BLD AUTO: ABNORMAL 10*3/UL
BASOPHILS # BLD MANUAL: 0.08 10*3/MM3 (ref 0–0.2)
BASOPHILS NFR BLD MANUAL: 1 % (ref 0–1.5)
BLD GP AB SCN SERPL QL: NEGATIVE
C TRACH RRNA SPEC QL NAA+PROBE: NEGATIVE
DIFFERENTIAL COMMENT: ABNORMAL
DRUGS UR: NORMAL
EOSINOPHIL # BLD AUTO: ABNORMAL 10*3/UL
EOSINOPHIL # BLD MANUAL: 0.15 10*3/MM3 (ref 0–0.4)
EOSINOPHIL NFR BLD AUTO: ABNORMAL %
EOSINOPHIL NFR BLD MANUAL: 2 % (ref 0.3–6.2)
ERYTHROCYTE [DISTWIDTH] IN BLOOD BY AUTOMATED COUNT: 12.1 % (ref 12.3–15.4)
HBV SURFACE AG SERPL QL IA: NEGATIVE
HCT VFR BLD AUTO: 38.4 % (ref 34–46.6)
HGB BLD-MCNC: 12.5 G/DL (ref 12–15.9)
HIV 1+2 AB+HIV1 P24 AG SERPL QL IA: NON REACTIVE
LYMPHOCYTES # BLD AUTO: ABNORMAL 10*3/UL
LYMPHOCYTES # BLD MANUAL: 0.85 10*3/MM3 (ref 0.7–3.1)
LYMPHOCYTES NFR BLD AUTO: ABNORMAL %
LYMPHOCYTES NFR BLD MANUAL: 11 % (ref 19.6–45.3)
MCH RBC QN AUTO: 32.1 PG (ref 26.6–33)
MCHC RBC AUTO-ENTMCNC: 32.6 G/DL (ref 31.5–35.7)
MCV RBC AUTO: 98.7 FL (ref 79–97)
MONOCYTES # BLD MANUAL: 0.54 10*3/MM3 (ref 0.1–0.9)
MONOCYTES NFR BLD AUTO: ABNORMAL %
MONOCYTES NFR BLD MANUAL: 7 % (ref 5–12)
N GONORRHOEA RRNA SPEC QL NAA+PROBE: NEGATIVE
NEUTROPHILS # BLD MANUAL: 6.08 10*3/MM3 (ref 1.7–7)
NEUTROPHILS NFR BLD AUTO: ABNORMAL %
NEUTROPHILS NFR BLD MANUAL: 79 % (ref 42.7–76)
PLATELET # BLD AUTO: 140 10*3/MM3 (ref 140–450)
PLATELET BLD QL SMEAR: ABNORMAL
RBC # BLD AUTO: 3.89 10*6/MM3 (ref 3.77–5.28)
RBC MORPH BLD: ABNORMAL
RH BLD: POSITIVE
RPR SER QL: NON REACTIVE
RUBV IGG SERPL IA-ACNC: 1.52 INDEX
WBC # BLD AUTO: 7.7 10*3/MM3 (ref 3.4–10.8)

## 2020-08-24 ENCOUNTER — TELEPHONE (OUTPATIENT)
Dept: OBSTETRICS AND GYNECOLOGY | Facility: CLINIC | Age: 30
End: 2020-08-24

## 2020-08-24 RX ORDER — METOCLOPRAMIDE 10 MG/1
10 TABLET ORAL 3 TIMES DAILY PRN
Qty: 30 TABLET | Refills: 2 | Status: SHIPPED | OUTPATIENT
Start: 2020-08-24 | End: 2020-10-01

## 2020-08-24 NOTE — TELEPHONE ENCOUNTER
Patient calls with c/o Vitamin B12 and Unisom not working for her NV. Pt states having tried everything and nothing is working. Pt 9wks and 4 days.

## 2020-09-03 ENCOUNTER — ROUTINE PRENATAL (OUTPATIENT)
Dept: OBSTETRICS AND GYNECOLOGY | Facility: CLINIC | Age: 30
End: 2020-09-03

## 2020-09-03 VITALS — BODY MASS INDEX: 25.22 KG/M2 | SYSTOLIC BLOOD PRESSURE: 110 MMHG | WEIGHT: 161 LBS | DIASTOLIC BLOOD PRESSURE: 74 MMHG

## 2020-09-03 DIAGNOSIS — O09.891 HISTORY OF PRETERM DELIVERY, CURRENTLY PREGNANT, FIRST TRIMESTER: Primary | ICD-10-CM

## 2020-09-03 LAB
GLUCOSE UR STRIP-MCNC: NEGATIVE MG/DL
PROT UR STRIP-MCNC: NEGATIVE MG/DL

## 2020-09-03 PROCEDURE — 0502F SUBSEQUENT PRENATAL CARE: CPT | Performed by: OBSTETRICS & GYNECOLOGY

## 2020-09-03 NOTE — PROGRESS NOTES
Feeling well, no nausea  Reviewed normal prenatal labs  ffDNA and maternal carrier screening today  Refer to MFM due to history previable delivery

## 2020-09-14 ENCOUNTER — TELEPHONE (OUTPATIENT)
Dept: OBSTETRICS AND GYNECOLOGY | Facility: CLINIC | Age: 30
End: 2020-09-14

## 2020-10-01 ENCOUNTER — ROUTINE PRENATAL (OUTPATIENT)
Dept: OBSTETRICS AND GYNECOLOGY | Facility: CLINIC | Age: 30
End: 2020-10-01

## 2020-10-01 VITALS — SYSTOLIC BLOOD PRESSURE: 100 MMHG | DIASTOLIC BLOOD PRESSURE: 70 MMHG | BODY MASS INDEX: 25.53 KG/M2 | WEIGHT: 163 LBS

## 2020-10-01 DIAGNOSIS — O09.891 HISTORY OF PRETERM DELIVERY, CURRENTLY PREGNANT, FIRST TRIMESTER: Primary | ICD-10-CM

## 2020-10-01 LAB
GLUCOSE UR STRIP-MCNC: NEGATIVE MG/DL
PROT UR STRIP-MCNC: NEGATIVE MG/DL

## 2020-10-01 PROCEDURE — 0502F SUBSEQUENT PRENATAL CARE: CPT | Performed by: OBSTETRICS & GYNECOLOGY

## 2020-10-01 PROCEDURE — 90471 IMMUNIZATION ADMIN: CPT | Performed by: OBSTETRICS & GYNECOLOGY

## 2020-10-01 PROCEDURE — 90686 IIV4 VACC NO PRSV 0.5 ML IM: CPT | Performed by: OBSTETRICS & GYNECOLOGY

## 2020-10-01 NOTE — PROGRESS NOTES
Feeling well, no nausea  Reviewed normal ffDNA, GIRL!  Normal maternal carrier screening   Has MFM appointment next week for cervical length  Flu vaccine today

## 2020-10-24 ENCOUNTER — DOCUMENTATION (OUTPATIENT)
Dept: OBSTETRICS AND GYNECOLOGY | Facility: CLINIC | Age: 30
End: 2020-10-24

## 2020-10-24 NOTE — PROGRESS NOTES
Patient called answering service after one episode of brown discharge. Discussed with patient that if she were to have bleeding or cramping she would need to go to the ER for further evaluation. Previous cervical length was 5+ cm on 10-21. Patient verbalized understanding of plan.

## 2020-10-29 ENCOUNTER — ROUTINE PRENATAL (OUTPATIENT)
Dept: OBSTETRICS AND GYNECOLOGY | Facility: CLINIC | Age: 30
End: 2020-10-29

## 2020-10-29 VITALS — BODY MASS INDEX: 26.63 KG/M2 | DIASTOLIC BLOOD PRESSURE: 84 MMHG | WEIGHT: 170 LBS | SYSTOLIC BLOOD PRESSURE: 122 MMHG

## 2020-10-29 DIAGNOSIS — O09.892 HISTORY OF PRETERM DELIVERY, CURRENTLY PREGNANT IN SECOND TRIMESTER: Primary | ICD-10-CM

## 2020-10-29 LAB
GLUCOSE UR STRIP-MCNC: NEGATIVE MG/DL
PROT UR STRIP-MCNC: NEGATIVE MG/DL

## 2020-10-29 PROCEDURE — 0502F SUBSEQUENT PRENATAL CARE: CPT | Performed by: OBSTETRICS & GYNECOLOGY

## 2020-10-29 NOTE — PROGRESS NOTES
Starting to feel fetal movement  Feeling well, had spotting over the weekend but had vaginal US last week  Has anatomy US next week, cervical length was 5.2cm last week

## 2020-12-04 ENCOUNTER — ROUTINE PRENATAL (OUTPATIENT)
Dept: OBSTETRICS AND GYNECOLOGY | Facility: CLINIC | Age: 30
End: 2020-12-04

## 2020-12-04 VITALS — BODY MASS INDEX: 27.57 KG/M2 | WEIGHT: 176 LBS | DIASTOLIC BLOOD PRESSURE: 80 MMHG | SYSTOLIC BLOOD PRESSURE: 110 MMHG

## 2020-12-04 DIAGNOSIS — O09.892 HISTORY OF PRETERM DELIVERY, CURRENTLY PREGNANT IN SECOND TRIMESTER: Primary | ICD-10-CM

## 2020-12-04 LAB
GLUCOSE UR STRIP-MCNC: ABNORMAL MG/DL
PROT UR STRIP-MCNC: NEGATIVE MG/DL

## 2020-12-04 PROCEDURE — 0502F SUBSEQUENT PRENATAL CARE: CPT | Performed by: OBSTETRICS & GYNECOLOGY

## 2020-12-04 NOTE — PROGRESS NOTES
Good fetal movement  Reviewed normal MFM US from yesterday, cervical length 5.6cm  Glucola and Hgb next visit  Growth US next visit per MFM recommendation  Discussed possible dizzy spells and ensuring adequate hydration

## 2021-01-05 ENCOUNTER — ROUTINE PRENATAL (OUTPATIENT)
Dept: OBSTETRICS AND GYNECOLOGY | Facility: CLINIC | Age: 31
End: 2021-01-05

## 2021-01-05 VITALS — BODY MASS INDEX: 28.35 KG/M2 | DIASTOLIC BLOOD PRESSURE: 74 MMHG | WEIGHT: 181 LBS | SYSTOLIC BLOOD PRESSURE: 112 MMHG

## 2021-01-05 DIAGNOSIS — O09.893 HISTORY OF PRETERM DELIVERY, CURRENTLY PREGNANT, THIRD TRIMESTER: Primary | ICD-10-CM

## 2021-01-05 PROBLEM — O09.892 HISTORY OF PRETERM DELIVERY, CURRENTLY PREGNANT IN SECOND TRIMESTER: Status: RESOLVED | Noted: 2020-10-29 | Resolved: 2021-01-05

## 2021-01-05 LAB
GLUCOSE UR STRIP-MCNC: NEGATIVE MG/DL
PROT UR STRIP-MCNC: NEGATIVE MG/DL

## 2021-01-05 PROCEDURE — 0502F SUBSEQUENT PRENATAL CARE: CPT | Performed by: OBSTETRICS & GYNECOLOGY

## 2021-01-05 NOTE — PROGRESS NOTES
Good fetal movement  Glucola and Hgb today, Rh positive  Saw MFM 12/31, 87% growth, recommends recheck at 34 weeks  Discussed Tha Cuello contractions

## 2021-01-06 LAB
GLUCOSE 1H P 50 G GLC PO SERPL-MCNC: 175 MG/DL (ref 65–139)
HGB BLD-MCNC: 11.2 G/DL (ref 12–15.9)

## 2021-01-19 ENCOUNTER — HOSPITAL ENCOUNTER (OUTPATIENT)
Facility: HOSPITAL | Age: 31
End: 2021-01-19
Attending: OBSTETRICS & GYNECOLOGY | Admitting: OBSTETRICS & GYNECOLOGY

## 2021-01-19 ENCOUNTER — HOSPITAL ENCOUNTER (OUTPATIENT)
Facility: HOSPITAL | Age: 31
Discharge: HOME | End: 2021-01-19
Attending: OBSTETRICS & GYNECOLOGY | Admitting: OBSTETRICS & GYNECOLOGY

## 2021-01-19 ENCOUNTER — TELEPHONE (OUTPATIENT)
Dept: OBSTETRICS AND GYNECOLOGY | Facility: CLINIC | Age: 31
End: 2021-01-19

## 2021-01-19 VITALS
RESPIRATION RATE: 18 BRPM | DIASTOLIC BLOOD PRESSURE: 70 MMHG | HEIGHT: 67 IN | WEIGHT: 183 LBS | HEART RATE: 87 BPM | SYSTOLIC BLOOD PRESSURE: 112 MMHG | BODY MASS INDEX: 28.72 KG/M2 | TEMPERATURE: 97.9 F

## 2021-01-19 PROCEDURE — G0463 HOSPITAL OUTPT CLINIC VISIT: HCPCS

## 2021-01-19 PROCEDURE — G0378 HOSPITAL OBSERVATION PER HR: HCPCS

## 2021-01-19 NOTE — NURSING NOTE
Patient presented with vaginal bleeding since this am, dr valencia did a speculum exam, monitored for several hours, no more bleeding noted instructed to keep appointment on Thursday, and instructed to take it easy the rest of the day.

## 2021-01-19 NOTE — TELEPHONE ENCOUNTER
Pt called office and states that she woke up bleeding this morning and that she is on her way to the hospital now. I spoke with Dr Feng who wants the pt to go straight to L&D. Pt notified to go to L&D and voices understanding.

## 2021-01-20 PROBLEM — Z34.90 PREGNANCY: Status: ACTIVE | Noted: 2021-01-20

## 2021-01-21 ENCOUNTER — ROUTINE PRENATAL (OUTPATIENT)
Dept: OBSTETRICS AND GYNECOLOGY | Facility: CLINIC | Age: 31
End: 2021-01-21

## 2021-01-21 VITALS — SYSTOLIC BLOOD PRESSURE: 106 MMHG | DIASTOLIC BLOOD PRESSURE: 68 MMHG | BODY MASS INDEX: 28.35 KG/M2 | WEIGHT: 181 LBS

## 2021-01-21 DIAGNOSIS — O09.893 HISTORY OF PRETERM DELIVERY, CURRENTLY PREGNANT, THIRD TRIMESTER: Primary | ICD-10-CM

## 2021-01-21 PROBLEM — Z34.90 PREGNANCY: Status: RESOLVED | Noted: 2021-01-20 | Resolved: 2021-01-21

## 2021-01-21 LAB
GLUCOSE UR STRIP-MCNC: ABNORMAL MG/DL
PROT UR STRIP-MCNC: NEGATIVE MG/DL

## 2021-01-21 PROCEDURE — 0502F SUBSEQUENT PRENATAL CARE: CPT | Performed by: OBSTETRICS & GYNECOLOGY

## 2021-01-27 ENCOUNTER — TELEPHONE (OUTPATIENT)
Dept: OBSTETRICS AND GYNECOLOGY | Facility: CLINIC | Age: 31
End: 2021-01-27

## 2021-02-01 ENCOUNTER — TELEMEDICINE (OUTPATIENT)
Dept: OBSTETRICS AND GYNECOLOGY | Facility: CLINIC | Age: 31
End: 2021-02-01

## 2021-02-01 ENCOUNTER — TELEPHONE (OUTPATIENT)
Dept: OBSTETRICS AND GYNECOLOGY | Facility: CLINIC | Age: 31
End: 2021-02-01

## 2021-02-01 DIAGNOSIS — Z34.83 ENCOUNTER FOR SUPERVISION OF OTHER NORMAL PREGNANCY IN THIRD TRIMESTER: Primary | ICD-10-CM

## 2021-02-01 DIAGNOSIS — Z20.822 CLOSE EXPOSURE TO COVID-19 VIRUS: Primary | ICD-10-CM

## 2021-02-01 PROCEDURE — 0502F SUBSEQUENT PRENATAL CARE: CPT | Performed by: OBSTETRICS & GYNECOLOGY

## 2021-02-01 NOTE — TELEPHONE ENCOUNTER
Pt had video visit with Dr Flores today. COVID test recommended due to exposure to + and now symptomatic.

## 2021-02-01 NOTE — PROGRESS NOTES
32 4/7 weeks today  Good fetal movement, no bleeding   has COVID, had symptoms 1 week ago, tested positive 5 days ago  Patient has mild congestion, recommend testing  Plan Tdap next visit

## 2021-02-02 ENCOUNTER — LAB (OUTPATIENT)
Dept: LAB | Facility: HOSPITAL | Age: 31
End: 2021-02-02

## 2021-02-02 DIAGNOSIS — Z20.822 ENCOUNTER FOR PREPROCEDURE SCREENING LABORATORY TESTING FOR COVID-19: Primary | ICD-10-CM

## 2021-02-02 DIAGNOSIS — Z01.812 ENCOUNTER FOR PREPROCEDURE SCREENING LABORATORY TESTING FOR COVID-19: Primary | ICD-10-CM

## 2021-02-02 LAB — SARS-COV-2 ORF1AB RESP QL NAA+PROBE: DETECTED

## 2021-02-02 PROCEDURE — C9803 HOPD COVID-19 SPEC COLLECT: HCPCS

## 2021-02-02 PROCEDURE — U0004 COV-19 TEST NON-CDC HGH THRU: HCPCS

## 2021-02-03 ENCOUNTER — TELEPHONE (OUTPATIENT)
Dept: OBSTETRICS AND GYNECOLOGY | Facility: CLINIC | Age: 31
End: 2021-02-03

## 2021-02-03 NOTE — TELEPHONE ENCOUNTER
Pt is requesting a letter to return to work after her COVID quarantine dates are finished. Pt states symptom start date was 1/30/21. Pt informed that is considered day 0 of quarantine and she is to stay home until 2/9/21. She may return to work on 2/10/21 and I will put work letter in my chart. She will call if the letter needs to be faxed elsewhere.

## 2021-02-15 ENCOUNTER — ROUTINE PRENATAL (OUTPATIENT)
Dept: OBSTETRICS AND GYNECOLOGY | Facility: CLINIC | Age: 31
End: 2021-02-15

## 2021-02-15 VITALS — BODY MASS INDEX: 29.6 KG/M2 | DIASTOLIC BLOOD PRESSURE: 60 MMHG | SYSTOLIC BLOOD PRESSURE: 98 MMHG | WEIGHT: 189 LBS

## 2021-02-15 DIAGNOSIS — Z34.83 ENCOUNTER FOR SUPERVISION OF OTHER NORMAL PREGNANCY IN THIRD TRIMESTER: Primary | ICD-10-CM

## 2021-02-15 LAB
GLUCOSE UR STRIP-MCNC: NEGATIVE MG/DL
GLUCOSE UR STRIP-MCNC: NEGATIVE MG/DL
PROT UR STRIP-MCNC: NEGATIVE MG/DL

## 2021-02-15 PROCEDURE — 0502F SUBSEQUENT PRENATAL CARE: CPT | Performed by: NURSE PRACTITIONER

## 2021-02-15 NOTE — PROGRESS NOTES
Pt reports good fetal movements and voices no concerns.   Reviewed fetal movement counts and encouraged, pt to report decreased fetal movements.  Pt denies UC's, pelvic pain, vaginal bleeding, leaking of fluid, HA, visual disturbances and epigastric pain.    Pt has not had Tdap this pregnancy.   Discussed with  r/t pt recently had Covid 19. He has advised not to give Tdap at this time and also advised to begin ASA 81 mg.   Pt informed and voiced understanding.       Discussed plan of care and S&S to report.

## 2021-02-22 ENCOUNTER — TELEPHONE (OUTPATIENT)
Dept: OBSTETRICS AND GYNECOLOGY | Facility: CLINIC | Age: 31
End: 2021-02-22

## 2021-02-22 NOTE — TELEPHONE ENCOUNTER
----- Message from NAYAN Latham sent at 2/15/2021  9:21 PM CST -----  Pt will need a growth US. Dr. Flores's note indicates at 34 wks but it was not scheduled for today. Please schedule based on Dr. Flores's preference.

## 2021-02-22 NOTE — TELEPHONE ENCOUNTER
Information given to  to add to 2/25/21 per Gracie's note. Informed to let patient know about time changes r/t appt.

## 2021-02-25 ENCOUNTER — ROUTINE PRENATAL (OUTPATIENT)
Dept: OBSTETRICS AND GYNECOLOGY | Facility: CLINIC | Age: 31
End: 2021-02-25

## 2021-02-25 VITALS — DIASTOLIC BLOOD PRESSURE: 64 MMHG | WEIGHT: 190 LBS | SYSTOLIC BLOOD PRESSURE: 110 MMHG | BODY MASS INDEX: 29.76 KG/M2

## 2021-02-25 DIAGNOSIS — Z34.83 ENCOUNTER FOR SUPERVISION OF OTHER NORMAL PREGNANCY IN THIRD TRIMESTER: ICD-10-CM

## 2021-02-25 DIAGNOSIS — O09.893 HISTORY OF PRETERM DELIVERY, CURRENTLY PREGNANT, THIRD TRIMESTER: Primary | ICD-10-CM

## 2021-02-25 LAB
GLUCOSE UR STRIP-MCNC: NEGATIVE MG/DL
PROT UR STRIP-MCNC: NEGATIVE MG/DL

## 2021-02-25 PROCEDURE — 0502F SUBSEQUENT PRENATAL CARE: CPT | Performed by: OBSTETRICS & GYNECOLOGY

## 2021-02-25 PROCEDURE — 90715 TDAP VACCINE 7 YRS/> IM: CPT | Performed by: OBSTETRICS & GYNECOLOGY

## 2021-02-25 PROCEDURE — 90471 IMMUNIZATION ADMIN: CPT | Performed by: OBSTETRICS & GYNECOLOGY

## 2021-02-25 RX ORDER — ERGOCALCIFEROL 1.25 MG/1
50000 CAPSULE ORAL WEEKLY
COMMUNITY
End: 2022-01-17

## 2021-02-25 NOTE — PROGRESS NOTES
Good fetal movement  Has had a few contractions  Cervix moderate, posterior  GBS done, Tdap today  US today 83% growth, ZULMA 17.3cm  Labor instructions    Diagnoses and all orders for this visit:    1. History of  delivery, currently pregnant, third trimester (Primary)  -     Tdap Vaccine Greater Than or Equal To 6yo IM    2. Encounter for supervision of other normal pregnancy in third trimester  -     Chlamydia trachomatis, Neisseria gonorrhoeae, PCR w/ confirmation - Swab, Vagina  -     Strep B Screen - Swab, Vaginal/Rectum

## 2021-02-27 LAB
C TRACH RRNA SPEC QL NAA+PROBE: NEGATIVE
GP B STREP DNA SPEC QL NAA+PROBE: POSITIVE
N GONORRHOEA RRNA SPEC QL NAA+PROBE: NEGATIVE

## 2021-03-04 ENCOUNTER — ROUTINE PRENATAL (OUTPATIENT)
Dept: OBSTETRICS AND GYNECOLOGY | Facility: CLINIC | Age: 31
End: 2021-03-04

## 2021-03-04 VITALS — BODY MASS INDEX: 30.54 KG/M2 | DIASTOLIC BLOOD PRESSURE: 82 MMHG | WEIGHT: 195 LBS | SYSTOLIC BLOOD PRESSURE: 108 MMHG

## 2021-03-04 DIAGNOSIS — O09.893 HISTORY OF PRETERM DELIVERY, CURRENTLY PREGNANT, THIRD TRIMESTER: Primary | ICD-10-CM

## 2021-03-04 LAB
GLUCOSE UR STRIP-MCNC: NEGATIVE MG/DL
PROT UR STRIP-MCNC: NEGATIVE MG/DL

## 2021-03-04 PROCEDURE — 0502F SUBSEQUENT PRENATAL CARE: CPT | Performed by: OBSTETRICS & GYNECOLOGY

## 2021-03-04 NOTE — PROGRESS NOTES
Good fetal movement  No contractions  Reviewed GBS positive  Labor instructions    Diagnoses and all orders for this visit:    1. History of  delivery, currently pregnant, third trimester (Primary)

## 2021-03-11 ENCOUNTER — ROUTINE PRENATAL (OUTPATIENT)
Dept: OBSTETRICS AND GYNECOLOGY | Facility: CLINIC | Age: 31
End: 2021-03-11

## 2021-03-11 VITALS — WEIGHT: 201 LBS | SYSTOLIC BLOOD PRESSURE: 102 MMHG | BODY MASS INDEX: 31.48 KG/M2 | DIASTOLIC BLOOD PRESSURE: 82 MMHG

## 2021-03-11 DIAGNOSIS — O09.893 HISTORY OF PRETERM DELIVERY, CURRENTLY PREGNANT, THIRD TRIMESTER: Primary | ICD-10-CM

## 2021-03-11 LAB
GLUCOSE UR STRIP-MCNC: NEGATIVE MG/DL
PROT UR STRIP-MCNC: NEGATIVE MG/DL

## 2021-03-11 PROCEDURE — 0502F SUBSEQUENT PRENATAL CARE: CPT | Performed by: OBSTETRICS & GYNECOLOGY

## 2021-03-11 NOTE — PROGRESS NOTES
Good fetal movement  No contractions  Cervix posterior, moderate  Labor instructions    Diagnoses and all orders for this visit:    1. History of  delivery, currently pregnant, third trimester (Primary)

## 2021-03-18 ENCOUNTER — ROUTINE PRENATAL (OUTPATIENT)
Dept: OBSTETRICS AND GYNECOLOGY | Facility: CLINIC | Age: 31
End: 2021-03-18

## 2021-03-18 VITALS — SYSTOLIC BLOOD PRESSURE: 110 MMHG | DIASTOLIC BLOOD PRESSURE: 74 MMHG | WEIGHT: 203 LBS | BODY MASS INDEX: 31.79 KG/M2

## 2021-03-18 DIAGNOSIS — O09.893 HISTORY OF PRETERM DELIVERY, CURRENTLY PREGNANT, THIRD TRIMESTER: Primary | ICD-10-CM

## 2021-03-18 LAB
GLUCOSE UR STRIP-MCNC: NEGATIVE MG/DL
PROT UR STRIP-MCNC: NEGATIVE MG/DL

## 2021-03-18 PROCEDURE — 0502F SUBSEQUENT PRENATAL CARE: CPT | Performed by: OBSTETRICS & GYNECOLOGY

## 2021-03-18 NOTE — PROGRESS NOTES
Good fetal movement  No contractions  Cervix soft, posterior  Reviewed GBS positive  Labor instructions    Diagnoses and all orders for this visit:    1. History of  delivery, currently pregnant, third trimester (Primary)

## 2021-03-22 ENCOUNTER — TELEPHONE (OUTPATIENT)
Dept: OBSTETRICS AND GYNECOLOGY | Facility: CLINIC | Age: 31
End: 2021-03-22

## 2021-03-22 ENCOUNTER — HOSPITAL ENCOUNTER (OUTPATIENT)
Facility: HOSPITAL | Age: 31
Setting detail: OBSERVATION
Discharge: HOME | End: 2021-03-22
Attending: OBSTETRICS & GYNECOLOGY | Admitting: OBSTETRICS & GYNECOLOGY

## 2021-03-22 VITALS
BODY MASS INDEX: 32.33 KG/M2 | WEIGHT: 206 LBS | DIASTOLIC BLOOD PRESSURE: 81 MMHG | RESPIRATION RATE: 18 BRPM | HEIGHT: 67 IN | HEART RATE: 78 BPM | SYSTOLIC BLOOD PRESSURE: 129 MMHG | TEMPERATURE: 97.1 F

## 2021-03-22 PROBLEM — Z34.90 PREGNANCY: Status: ACTIVE | Noted: 2021-03-22

## 2021-03-22 PROCEDURE — G0463 HOSPITAL OUTPT CLINIC VISIT: HCPCS

## 2021-03-22 PROCEDURE — G0378 HOSPITAL OBSERVATION PER HR: HCPCS

## 2021-03-22 NOTE — TELEPHONE ENCOUNTER
Pt called state lost mucous plug and is bleeding, bled through panty liner in 10 - 15 min. Discussed with Dr Flores, he advised pt go to LDR. Gave pt this information, pt states she understands and is going now.

## 2021-03-25 ENCOUNTER — ROUTINE PRENATAL (OUTPATIENT)
Dept: OBSTETRICS AND GYNECOLOGY | Facility: CLINIC | Age: 31
End: 2021-03-25

## 2021-03-25 VITALS — WEIGHT: 205 LBS | SYSTOLIC BLOOD PRESSURE: 104 MMHG | DIASTOLIC BLOOD PRESSURE: 82 MMHG | BODY MASS INDEX: 32.11 KG/M2

## 2021-03-25 DIAGNOSIS — O09.893 HISTORY OF PRETERM DELIVERY, CURRENTLY PREGNANT, THIRD TRIMESTER: Primary | ICD-10-CM

## 2021-03-25 PROBLEM — Z34.90 PREGNANCY: Status: RESOLVED | Noted: 2021-03-22 | Resolved: 2021-03-25

## 2021-03-25 LAB
GLUCOSE UR STRIP-MCNC: NEGATIVE MG/DL
PROT UR STRIP-MCNC: NEGATIVE MG/DL

## 2021-03-25 PROCEDURE — 0502F SUBSEQUENT PRENATAL CARE: CPT | Performed by: OBSTETRICS & GYNECOLOGY

## 2021-03-25 NOTE — PROGRESS NOTES
Good fetal movement  No contractions  Cervix soft, posterior  Labor instructions    Diagnoses and all orders for this visit:    1. History of  delivery, currently pregnant, third trimester (Primary)

## 2021-03-28 ENCOUNTER — ANESTHESIA (OUTPATIENT)
Dept: LABOR AND DELIVERY | Facility: HOSPITAL | Age: 31
End: 2021-03-28

## 2021-03-28 ENCOUNTER — ANESTHESIA EVENT (OUTPATIENT)
Dept: LABOR AND DELIVERY | Facility: HOSPITAL | Age: 31
End: 2021-03-28

## 2021-03-28 ENCOUNTER — HOSPITAL ENCOUNTER (INPATIENT)
Facility: HOSPITAL | Age: 31
LOS: 2 days | Discharge: HOME | End: 2021-03-30
Attending: OBSTETRICS & GYNECOLOGY | Admitting: OBSTETRICS & GYNECOLOGY

## 2021-03-28 DIAGNOSIS — Z3A.40 40 WEEKS GESTATION OF PREGNANCY: Primary | ICD-10-CM

## 2021-03-28 PROBLEM — Z34.90 PREGNANCY: Status: RESOLVED | Noted: 2021-03-28 | Resolved: 2021-03-28

## 2021-03-28 PROBLEM — Z34.90 PREGNANCY: Status: ACTIVE | Noted: 2021-03-28

## 2021-03-28 PROBLEM — O09.891 HISTORY OF PRETERM DELIVERY, CURRENTLY PREGNANT, FIRST TRIMESTER: Status: RESOLVED | Noted: 2020-08-06 | Resolved: 2021-03-28

## 2021-03-28 PROBLEM — O09.893 HISTORY OF PRETERM DELIVERY, CURRENTLY PREGNANT, THIRD TRIMESTER: Status: RESOLVED | Noted: 2021-01-05 | Resolved: 2021-03-28

## 2021-03-28 LAB
ABO GROUP BLD: NORMAL
BLD GP AB SCN SERPL QL: NEGATIVE
DEPRECATED RDW RBC AUTO: 43.7 FL (ref 37–54)
ERYTHROCYTE [DISTWIDTH] IN BLOOD BY AUTOMATED COUNT: 13 % (ref 12.3–15.4)
HCT VFR BLD AUTO: 32 % (ref 34–46.6)
HGB BLD-MCNC: 11.6 G/DL (ref 12–15.9)
MCH RBC QN AUTO: 33.6 PG (ref 26.6–33)
MCHC RBC AUTO-ENTMCNC: 36.3 G/DL (ref 31.5–35.7)
MCV RBC AUTO: 92.8 FL (ref 79–97)
PLATELET # BLD AUTO: 101 10*3/MM3 (ref 140–450)
PMV BLD AUTO: 12.8 FL (ref 6–12)
RBC # BLD AUTO: 3.45 10*6/MM3 (ref 3.77–5.28)
RH BLD: POSITIVE
T&S EXPIRATION DATE: NORMAL
WBC # BLD AUTO: 8.47 10*3/MM3 (ref 3.4–10.8)

## 2021-03-28 PROCEDURE — 86901 BLOOD TYPING SEROLOGIC RH(D): CPT | Performed by: OBSTETRICS & GYNECOLOGY

## 2021-03-28 PROCEDURE — 0KQM0ZZ REPAIR PERINEUM MUSCLE, OPEN APPROACH: ICD-10-PCS | Performed by: OBSTETRICS & GYNECOLOGY

## 2021-03-28 PROCEDURE — S0260 H&P FOR SURGERY: HCPCS | Performed by: OBSTETRICS & GYNECOLOGY

## 2021-03-28 PROCEDURE — 25010000002 FENTANYL CITRATE (PF) 250 MCG/5ML SOLUTION: Performed by: NURSE ANESTHETIST, CERTIFIED REGISTERED

## 2021-03-28 PROCEDURE — 59400 OBSTETRICAL CARE: CPT | Performed by: OBSTETRICS & GYNECOLOGY

## 2021-03-28 PROCEDURE — 88307 TISSUE EXAM BY PATHOLOGIST: CPT | Performed by: OBSTETRICS & GYNECOLOGY

## 2021-03-28 PROCEDURE — 25010000002 PENICILLIN G POTASSIUM PER 600000 UNITS: Performed by: OBSTETRICS & GYNECOLOGY

## 2021-03-28 PROCEDURE — 85027 COMPLETE CBC AUTOMATED: CPT | Performed by: OBSTETRICS & GYNECOLOGY

## 2021-03-28 PROCEDURE — 51702 INSERT TEMP BLADDER CATH: CPT

## 2021-03-28 PROCEDURE — 10907ZC DRAINAGE OF AMNIOTIC FLUID, THERAPEUTIC FROM PRODUCTS OF CONCEPTION, VIA NATURAL OR ARTIFICIAL OPENING: ICD-10-PCS | Performed by: OBSTETRICS & GYNECOLOGY

## 2021-03-28 PROCEDURE — 86850 RBC ANTIBODY SCREEN: CPT | Performed by: OBSTETRICS & GYNECOLOGY

## 2021-03-28 PROCEDURE — C1755 CATHETER, INTRASPINAL: HCPCS | Performed by: NURSE ANESTHETIST, CERTIFIED REGISTERED

## 2021-03-28 PROCEDURE — 86900 BLOOD TYPING SEROLOGIC ABO: CPT | Performed by: OBSTETRICS & GYNECOLOGY

## 2021-03-28 PROCEDURE — 25010000002 ONDANSETRON PER 1 MG: Performed by: OBSTETRICS & GYNECOLOGY

## 2021-03-28 PROCEDURE — 25010000002 ROPIVACAINE PER 1 MG: Performed by: NURSE ANESTHETIST, CERTIFIED REGISTERED

## 2021-03-28 RX ORDER — FENTANYL CITRATE 50 UG/ML
INJECTION, SOLUTION INTRAMUSCULAR; INTRAVENOUS AS NEEDED
Status: DISCONTINUED | OUTPATIENT
Start: 2021-03-28 | End: 2021-03-28 | Stop reason: SURG

## 2021-03-28 RX ORDER — IBUPROFEN 600 MG/1
600 TABLET ORAL EVERY 8 HOURS PRN
Status: DISCONTINUED | OUTPATIENT
Start: 2021-03-28 | End: 2021-03-30 | Stop reason: HOSPADM

## 2021-03-28 RX ORDER — NALOXONE HCL 0.4 MG/ML
0.4 VIAL (ML) INJECTION
Status: DISCONTINUED | OUTPATIENT
Start: 2021-03-28 | End: 2021-03-30 | Stop reason: HOSPADM

## 2021-03-28 RX ORDER — PROMETHAZINE HYDROCHLORIDE 12.5 MG/1
12.5 SUPPOSITORY RECTAL EVERY 6 HOURS PRN
Status: DISCONTINUED | OUTPATIENT
Start: 2021-03-28 | End: 2021-03-28 | Stop reason: HOSPADM

## 2021-03-28 RX ORDER — PROMETHAZINE HYDROCHLORIDE 12.5 MG/1
12.5 SUPPOSITORY RECTAL EVERY 6 HOURS PRN
Status: DISCONTINUED | OUTPATIENT
Start: 2021-03-28 | End: 2021-03-30 | Stop reason: HOSPADM

## 2021-03-28 RX ORDER — FAMOTIDINE 20 MG/1
20 TABLET, FILM COATED ORAL ONCE AS NEEDED
Status: DISCONTINUED | OUTPATIENT
Start: 2021-03-28 | End: 2021-03-28 | Stop reason: HOSPADM

## 2021-03-28 RX ORDER — OXYTOCIN/0.9 % SODIUM CHLORIDE 30/500 ML
2-30 PLASTIC BAG, INJECTION (ML) INTRAVENOUS
Status: DISCONTINUED | OUTPATIENT
Start: 2021-03-28 | End: 2021-03-30 | Stop reason: HOSPADM

## 2021-03-28 RX ORDER — PROMETHAZINE HYDROCHLORIDE 25 MG/1
25 TABLET ORAL EVERY 6 HOURS PRN
Status: DISCONTINUED | OUTPATIENT
Start: 2021-03-28 | End: 2021-03-30 | Stop reason: HOSPADM

## 2021-03-28 RX ORDER — ACETAMINOPHEN 325 MG/1
650 TABLET ORAL EVERY 4 HOURS PRN
Status: DISCONTINUED | OUTPATIENT
Start: 2021-03-28 | End: 2021-03-28 | Stop reason: HOSPADM

## 2021-03-28 RX ORDER — LIDOCAINE HYDROCHLORIDE AND EPINEPHRINE 15; 5 MG/ML; UG/ML
INJECTION, SOLUTION EPIDURAL
Status: COMPLETED | OUTPATIENT
Start: 2021-03-28 | End: 2021-03-28

## 2021-03-28 RX ORDER — LIDOCAINE HYDROCHLORIDE 20 MG/ML
INJECTION, SOLUTION EPIDURAL; INFILTRATION; INTRACAUDAL; PERINEURAL AS NEEDED
Status: DISCONTINUED | OUTPATIENT
Start: 2021-03-28 | End: 2021-03-28 | Stop reason: SURG

## 2021-03-28 RX ORDER — SODIUM CHLORIDE 0.9 % (FLUSH) 0.9 %
10 SYRINGE (ML) INJECTION AS NEEDED
Status: DISCONTINUED | OUTPATIENT
Start: 2021-03-28 | End: 2021-03-28 | Stop reason: HOSPADM

## 2021-03-28 RX ORDER — ONDANSETRON 4 MG/1
4 TABLET, FILM COATED ORAL EVERY 6 HOURS PRN
Status: DISCONTINUED | OUTPATIENT
Start: 2021-03-28 | End: 2021-03-28 | Stop reason: HOSPADM

## 2021-03-28 RX ORDER — OXYTOCIN/0.9 % SODIUM CHLORIDE 30/500 ML
125 PLASTIC BAG, INJECTION (ML) INTRAVENOUS CONTINUOUS PRN
Status: DISCONTINUED | OUTPATIENT
Start: 2021-03-28 | End: 2021-03-28 | Stop reason: HOSPADM

## 2021-03-28 RX ORDER — TERBUTALINE SULFATE 1 MG/ML
0.25 INJECTION, SOLUTION SUBCUTANEOUS AS NEEDED
Status: DISCONTINUED | OUTPATIENT
Start: 2021-03-28 | End: 2021-03-28 | Stop reason: HOSPADM

## 2021-03-28 RX ORDER — ONDANSETRON 2 MG/ML
4 INJECTION INTRAMUSCULAR; INTRAVENOUS EVERY 6 HOURS PRN
Status: DISCONTINUED | OUTPATIENT
Start: 2021-03-28 | End: 2021-03-28 | Stop reason: HOSPADM

## 2021-03-28 RX ORDER — METHYLERGONOVINE MALEATE 0.2 MG/ML
200 INJECTION INTRAVENOUS ONCE AS NEEDED
Status: DISCONTINUED | OUTPATIENT
Start: 2021-03-28 | End: 2021-03-28 | Stop reason: HOSPADM

## 2021-03-28 RX ORDER — OXYTOCIN/0.9 % SODIUM CHLORIDE 30/500 ML
250 PLASTIC BAG, INJECTION (ML) INTRAVENOUS CONTINUOUS
Status: ACTIVE | OUTPATIENT
Start: 2021-03-28 | End: 2021-03-28

## 2021-03-28 RX ORDER — MORPHINE SULFATE 2 MG/ML
1 INJECTION, SOLUTION INTRAMUSCULAR; INTRAVENOUS EVERY 4 HOURS PRN
Status: DISCONTINUED | OUTPATIENT
Start: 2021-03-28 | End: 2021-03-30 | Stop reason: HOSPADM

## 2021-03-28 RX ORDER — SODIUM CHLORIDE 0.9 % (FLUSH) 0.9 %
1-10 SYRINGE (ML) INJECTION AS NEEDED
Status: DISCONTINUED | OUTPATIENT
Start: 2021-03-28 | End: 2021-03-30 | Stop reason: HOSPADM

## 2021-03-28 RX ORDER — CARBOPROST TROMETHAMINE 250 UG/ML
250 INJECTION, SOLUTION INTRAMUSCULAR AS NEEDED
Status: DISCONTINUED | OUTPATIENT
Start: 2021-03-28 | End: 2021-03-28 | Stop reason: HOSPADM

## 2021-03-28 RX ORDER — ROPIVACAINE HYDROCHLORIDE 2 MG/ML
INJECTION, SOLUTION EPIDURAL; INFILTRATION; PERINEURAL AS NEEDED
Status: DISCONTINUED | OUTPATIENT
Start: 2021-03-28 | End: 2021-03-28 | Stop reason: SURG

## 2021-03-28 RX ORDER — FAMOTIDINE 10 MG/ML
20 INJECTION, SOLUTION INTRAVENOUS ONCE AS NEEDED
Status: DISCONTINUED | OUTPATIENT
Start: 2021-03-28 | End: 2021-03-28 | Stop reason: HOSPADM

## 2021-03-28 RX ORDER — CARBOPROST TROMETHAMINE 250 UG/ML
250 INJECTION, SOLUTION INTRAMUSCULAR ONCE
Status: DISCONTINUED | OUTPATIENT
Start: 2021-03-28 | End: 2021-03-30 | Stop reason: HOSPADM

## 2021-03-28 RX ORDER — PROMETHAZINE HYDROCHLORIDE 25 MG/1
12.5 TABLET ORAL EVERY 6 HOURS PRN
Status: DISCONTINUED | OUTPATIENT
Start: 2021-03-28 | End: 2021-03-28 | Stop reason: HOSPADM

## 2021-03-28 RX ORDER — SODIUM CHLORIDE, SODIUM LACTATE, POTASSIUM CHLORIDE, CALCIUM CHLORIDE 600; 310; 30; 20 MG/100ML; MG/100ML; MG/100ML; MG/100ML
125 INJECTION, SOLUTION INTRAVENOUS CONTINUOUS
Status: DISCONTINUED | OUTPATIENT
Start: 2021-03-28 | End: 2021-03-30 | Stop reason: HOSPADM

## 2021-03-28 RX ORDER — CALCIUM CARBONATE 200(500)MG
2 TABLET,CHEWABLE ORAL 3 TIMES DAILY PRN
Status: CANCELLED | OUTPATIENT
Start: 2021-03-28

## 2021-03-28 RX ORDER — MORPHINE SULFATE 10 MG/ML
10 INJECTION INTRAMUSCULAR; INTRAVENOUS; SUBCUTANEOUS
Status: DISCONTINUED | OUTPATIENT
Start: 2021-03-28 | End: 2021-03-28 | Stop reason: HOSPADM

## 2021-03-28 RX ORDER — LIDOCAINE HYDROCHLORIDE 10 MG/ML
5 INJECTION, SOLUTION EPIDURAL; INFILTRATION; INTRACAUDAL; PERINEURAL AS NEEDED
Status: DISCONTINUED | OUTPATIENT
Start: 2021-03-28 | End: 2021-03-28 | Stop reason: HOSPADM

## 2021-03-28 RX ORDER — PENICILLIN G 3000000 [IU]/50ML
3 INJECTION, SOLUTION INTRAVENOUS EVERY 4 HOURS
Status: DISCONTINUED | OUTPATIENT
Start: 2021-03-28 | End: 2021-03-28 | Stop reason: HOSPADM

## 2021-03-28 RX ORDER — BISACODYL 10 MG
10 SUPPOSITORY, RECTAL RECTAL DAILY PRN
Status: DISCONTINUED | OUTPATIENT
Start: 2021-03-29 | End: 2021-03-30 | Stop reason: HOSPADM

## 2021-03-28 RX ORDER — METHYLERGONOVINE MALEATE 0.2 MG/ML
200 INJECTION INTRAVENOUS ONCE
Status: DISCONTINUED | OUTPATIENT
Start: 2021-03-28 | End: 2021-03-30 | Stop reason: HOSPADM

## 2021-03-28 RX ORDER — HYDROCORTISONE 25 MG/G
1 CREAM TOPICAL AS NEEDED
Status: DISCONTINUED | OUTPATIENT
Start: 2021-03-28 | End: 2021-03-30 | Stop reason: HOSPADM

## 2021-03-28 RX ORDER — MISOPROSTOL 200 UG/1
600 TABLET ORAL ONCE
Status: DISCONTINUED | OUTPATIENT
Start: 2021-03-28 | End: 2021-03-30 | Stop reason: HOSPADM

## 2021-03-28 RX ORDER — ONDANSETRON 2 MG/ML
4 INJECTION INTRAMUSCULAR; INTRAVENOUS EVERY 6 HOURS PRN
Status: DISCONTINUED | OUTPATIENT
Start: 2021-03-28 | End: 2021-03-30 | Stop reason: HOSPADM

## 2021-03-28 RX ORDER — OXYCODONE HYDROCHLORIDE AND ACETAMINOPHEN 5; 325 MG/1; MG/1
2 TABLET ORAL EVERY 4 HOURS PRN
Status: DISCONTINUED | OUTPATIENT
Start: 2021-03-28 | End: 2021-03-28 | Stop reason: HOSPADM

## 2021-03-28 RX ORDER — CALCIUM CARBONATE 200(500)MG
2 TABLET,CHEWABLE ORAL 3 TIMES DAILY PRN
Status: DISCONTINUED | OUTPATIENT
Start: 2021-03-28 | End: 2021-03-28 | Stop reason: HOSPADM

## 2021-03-28 RX ORDER — MISOPROSTOL 200 UG/1
800 TABLET ORAL AS NEEDED
Status: DISCONTINUED | OUTPATIENT
Start: 2021-03-28 | End: 2021-03-28 | Stop reason: HOSPADM

## 2021-03-28 RX ORDER — ONDANSETRON 4 MG/1
4 TABLET, FILM COATED ORAL EVERY 8 HOURS PRN
Status: DISCONTINUED | OUTPATIENT
Start: 2021-03-28 | End: 2021-03-30 | Stop reason: HOSPADM

## 2021-03-28 RX ORDER — ACETAMINOPHEN 325 MG/1
650 TABLET ORAL EVERY 4 HOURS PRN
Status: DISCONTINUED | OUTPATIENT
Start: 2021-03-28 | End: 2021-03-28 | Stop reason: SDUPTHER

## 2021-03-28 RX ORDER — SODIUM CHLORIDE 0.9 % (FLUSH) 0.9 %
3 SYRINGE (ML) INJECTION EVERY 12 HOURS SCHEDULED
Status: DISCONTINUED | OUTPATIENT
Start: 2021-03-28 | End: 2021-03-28 | Stop reason: HOSPADM

## 2021-03-28 RX ORDER — LIDOCAINE HYDROCHLORIDE 10 MG/ML
INJECTION, SOLUTION INFILTRATION; PERINEURAL
Status: DISCONTINUED
Start: 2021-03-28 | End: 2021-03-29 | Stop reason: WASHOUT

## 2021-03-28 RX ORDER — OXYTOCIN/0.9 % SODIUM CHLORIDE 30/500 ML
999 PLASTIC BAG, INJECTION (ML) INTRAVENOUS ONCE
Status: COMPLETED | OUTPATIENT
Start: 2021-03-28 | End: 2021-03-28

## 2021-03-28 RX ORDER — DOCUSATE SODIUM 100 MG/1
100 CAPSULE, LIQUID FILLED ORAL 2 TIMES DAILY
Status: DISCONTINUED | OUTPATIENT
Start: 2021-03-28 | End: 2021-03-30 | Stop reason: HOSPADM

## 2021-03-28 RX ADMIN — IBUPROFEN 600 MG: 600 TABLET, FILM COATED ORAL at 19:29

## 2021-03-28 RX ADMIN — PENICILLIN G 3 MILLION UNITS: 3000000 INJECTION, SOLUTION INTRAVENOUS at 12:08

## 2021-03-28 RX ADMIN — FENTANYL CITRATE 150 MCG: 50 INJECTION INTRAMUSCULAR; INTRAVENOUS at 09:03

## 2021-03-28 RX ADMIN — PENICILLIN G 3 MILLION UNITS: 3000000 INJECTION, SOLUTION INTRAVENOUS at 16:06

## 2021-03-28 RX ADMIN — ROPIVACAINE HYDROCHLORIDE 10 ML: 2 INJECTION, SOLUTION EPIDURAL; INFILTRATION at 08:59

## 2021-03-28 RX ADMIN — OXYTOCIN-SODIUM CHLORIDE 0.9% IV SOLN 30 UNIT/500ML 2 MILLI-UNITS/MIN: 30-0.9/5 SOLUTION at 14:17

## 2021-03-28 RX ADMIN — OXYTOCIN-SODIUM CHLORIDE 0.9% IV SOLN 30 UNIT/500ML 250 ML/HR: 30-0.9/5 SOLUTION at 18:51

## 2021-03-28 RX ADMIN — ONDANSETRON HYDROCHLORIDE 4 MG: 2 SOLUTION INTRAMUSCULAR; INTRAVENOUS at 11:00

## 2021-03-28 RX ADMIN — LIDOCAINE HYDROCHLORIDE 3 ML: 20 INJECTION, SOLUTION EPIDURAL; INFILTRATION; INTRACAUDAL; PERINEURAL at 08:42

## 2021-03-28 RX ADMIN — SODIUM CHLORIDE, POTASSIUM CHLORIDE, SODIUM LACTATE AND CALCIUM CHLORIDE 125 ML/HR: 600; 310; 30; 20 INJECTION, SOLUTION INTRAVENOUS at 22:42

## 2021-03-28 RX ADMIN — ONDANSETRON HYDROCHLORIDE 4 MG: 2 SOLUTION INTRAMUSCULAR; INTRAVENOUS at 17:01

## 2021-03-28 RX ADMIN — ROPIVACAINE HYDROCHLORIDE 12 ML/HR: 2 INJECTION, SOLUTION EPIDURAL; INFILTRATION at 09:03

## 2021-03-28 RX ADMIN — FENTANYL CITRATE 100 MCG: 50 INJECTION INTRAMUSCULAR; INTRAVENOUS at 08:59

## 2021-03-28 RX ADMIN — SODIUM CHLORIDE, POTASSIUM CHLORIDE, SODIUM LACTATE AND CALCIUM CHLORIDE 125 ML/HR: 600; 310; 30; 20 INJECTION, SOLUTION INTRAVENOUS at 07:47

## 2021-03-28 RX ADMIN — SODIUM CHLORIDE 5 MILLION UNITS: 900 INJECTION INTRAVENOUS at 07:52

## 2021-03-28 RX ADMIN — SODIUM CHLORIDE, POTASSIUM CHLORIDE, SODIUM LACTATE AND CALCIUM CHLORIDE 125 ML/HR: 600; 310; 30; 20 INJECTION, SOLUTION INTRAVENOUS at 15:02

## 2021-03-28 RX ADMIN — SODIUM CHLORIDE, POTASSIUM CHLORIDE, SODIUM LACTATE AND CALCIUM CHLORIDE 1000 ML: 600; 310; 30; 20 INJECTION, SOLUTION INTRAVENOUS at 09:12

## 2021-03-28 RX ADMIN — LIDOCAINE HYDROCHLORIDE AND EPINEPHRINE 3 ML: 15; 5 INJECTION, SOLUTION EPIDURAL at 08:52

## 2021-03-28 RX ADMIN — OXYTOCIN-SODIUM CHLORIDE 0.9% IV SOLN 30 UNIT/500ML 999 ML/HR: 30-0.9/5 SOLUTION at 18:46

## 2021-03-28 NOTE — ANESTHESIA PROCEDURE NOTES
Labor Epidural      Patient reassessed immediately prior to procedure    Start Time: 3/28/2021 8:40 AM  Stop Time: 3/28/2021 9:03 AM  Performed By  CRNA: Bill White CRNA  Preanesthetic Checklist  Completed: patient identified, IV checked, site marked, risks and benefits discussed, surgical consent, monitors and equipment checked, pre-op evaluation and timeout performed  Prep:  Pt Position:sitting  Sterile Tech:cap, gloves, mask and sterile barrier  Prep:DuraPrep  Monitoring:blood pressure monitoring, continuous pulse oximetry and EKG  Epidural Block Procedure:  Approach:midline  Guidance:palpation technique  Location:L2-L3  Needle Type:Tuohy  Needle Gauge:18 G  Loss of Resistance Medium: saline  Loss of Resistance: 7cm  Cath Depth at skin:13 cm  Paresthesia: none  Aspiration:negative  Test Dose:negative  Test dose medication: lidocaine 1.5%-EPINEPHrine 1:200,000 (XYLOCAINE W/EPI) injection, 3 mL  Med administered at 3/28/2021 8:52 AM  Number of Attempts: 1  Post Assessment:  Dressing:occlusive dressing applied and secured with tape  Pt Tolerance:patient tolerated the procedure well with no apparent complications  Complications:no

## 2021-03-28 NOTE — ANESTHESIA PREPROCEDURE EVALUATION
Anesthesia Evaluation     history of anesthetic complications: PONV  NPO Solid Status: > 4 hours  NPO Liquid Status: > 4 hours           Airway   Mallampati: II  TM distance: >3 FB  Dental - normal exam     Pulmonary - negative pulmonary ROS and normal exam   Cardiovascular - negative cardio ROS and normal exam  Exercise tolerance: excellent (>7 METS)        Neuro/Psych- negative ROS  GI/Hepatic/Renal/Endo    (+) obesity,       Musculoskeletal (-) negative ROS    Abdominal   (+) obese,    Substance History - negative use     OB/GYN    (+) Pregnant,         Other - negative ROS                       Anesthesia Plan    ASA 2     epidural       Anesthetic plan, all risks, benefits, and alternatives have been provided, discussed and informed consent has been obtained with: patient.

## 2021-03-29 LAB
BASOPHILS # BLD AUTO: 0.03 10*3/MM3 (ref 0–0.2)
BASOPHILS NFR BLD AUTO: 0.2 % (ref 0–1.5)
DEPRECATED RDW RBC AUTO: 45.3 FL (ref 37–54)
EOSINOPHIL # BLD AUTO: 0.01 10*3/MM3 (ref 0–0.4)
EOSINOPHIL NFR BLD AUTO: 0.1 % (ref 0.3–6.2)
ERYTHROCYTE [DISTWIDTH] IN BLOOD BY AUTOMATED COUNT: 13.3 % (ref 12.3–15.4)
HCT VFR BLD AUTO: 29.6 % (ref 34–46.6)
HGB BLD-MCNC: 10.3 G/DL (ref 12–15.9)
IMM GRANULOCYTES # BLD AUTO: 0.12 10*3/MM3 (ref 0–0.05)
IMM GRANULOCYTES NFR BLD AUTO: 0.7 % (ref 0–0.5)
LYMPHOCYTES # BLD AUTO: 1.29 10*3/MM3 (ref 0.7–3.1)
LYMPHOCYTES NFR BLD AUTO: 7.1 % (ref 19.6–45.3)
MCH RBC QN AUTO: 32.8 PG (ref 26.6–33)
MCHC RBC AUTO-ENTMCNC: 34.8 G/DL (ref 31.5–35.7)
MCV RBC AUTO: 94.3 FL (ref 79–97)
MONOCYTES # BLD AUTO: 1.24 10*3/MM3 (ref 0.1–0.9)
MONOCYTES NFR BLD AUTO: 6.8 % (ref 5–12)
NEUTROPHILS NFR BLD AUTO: 15.43 10*3/MM3 (ref 1.7–7)
NEUTROPHILS NFR BLD AUTO: 85.1 % (ref 42.7–76)
NRBC BLD AUTO-RTO: 0 /100 WBC (ref 0–0.2)
PLATELET # BLD AUTO: 95 10*3/MM3 (ref 140–450)
PMV BLD AUTO: 12 FL (ref 6–12)
RBC # BLD AUTO: 3.14 10*6/MM3 (ref 3.77–5.28)
WBC # BLD AUTO: 18.12 10*3/MM3 (ref 3.4–10.8)

## 2021-03-29 PROCEDURE — 85025 COMPLETE CBC W/AUTO DIFF WBC: CPT | Performed by: OBSTETRICS & GYNECOLOGY

## 2021-03-29 RX ORDER — HYDROCODONE BITARTRATE AND ACETAMINOPHEN 5; 325 MG/1; MG/1
1 TABLET ORAL EVERY 6 HOURS PRN
Status: DISCONTINUED | OUTPATIENT
Start: 2021-03-29 | End: 2021-03-30 | Stop reason: HOSPADM

## 2021-03-29 RX ADMIN — BENZOCAINE AND LEVOMENTHOL: 200; 5 SPRAY TOPICAL at 09:03

## 2021-03-29 RX ADMIN — IBUPROFEN 600 MG: 600 TABLET, FILM COATED ORAL at 16:06

## 2021-03-29 RX ADMIN — HYDROCORTISONE 2.5% 1 APPLICATION: 25 CREAM TOPICAL at 09:03

## 2021-03-29 RX ADMIN — HYDROCODONE BITARTRATE AND ACETAMINOPHEN 1 TABLET: 5; 325 TABLET ORAL at 22:44

## 2021-03-29 RX ADMIN — HYDROCODONE BITARTRATE AND ACETAMINOPHEN 1 TABLET: 5; 325 TABLET ORAL at 09:03

## 2021-03-29 RX ADMIN — DOCUSATE SODIUM 100 MG: 100 CAPSULE ORAL at 22:44

## 2021-03-29 RX ADMIN — IBUPROFEN 600 MG: 600 TABLET, FILM COATED ORAL at 06:06

## 2021-03-29 RX ADMIN — DOCUSATE SODIUM 100 MG: 100 CAPSULE ORAL at 09:03

## 2021-03-29 NOTE — ANESTHESIA POSTPROCEDURE EVALUATION
Patient: Corrine Conte    Procedure Summary     Date: 03/28/21 Room / Location:     Anesthesia Start: 0840 Anesthesia Stop: 1810    Procedure: LABOR ANALGESIA Diagnosis:     Scheduled Providers:  Provider: Bill White CRNA    Anesthesia Type: epidural ASA Status: 2          Anesthesia Type: epidural    Vitals  Vitals Value Taken Time   /75 03/29/21 0850   Temp 98.3 °F (36.8 °C) 03/29/21 0850   Pulse 75 03/29/21 0850   Resp 18 03/29/21 0850   SpO2 100 % 03/29/21 0850           Post Anesthesia Care and Evaluation    Patient location during evaluation: PACU  Patient participation: complete - patient participated  Level of consciousness: awake and awake and alert  Pain score: 0  Pain management: adequate  Airway patency: patent  Anesthetic complications: No anesthetic complications    Cardiovascular status: acceptable and stable  Respiratory status: acceptable and unassisted  Hydration status: acceptable  Post Neuraxial Block status: Motor and sensory function returned to baseline and No signs or symptoms of PDPH

## 2021-03-30 VITALS
RESPIRATION RATE: 16 BRPM | HEIGHT: 67 IN | BODY MASS INDEX: 32.33 KG/M2 | WEIGHT: 206 LBS | DIASTOLIC BLOOD PRESSURE: 79 MMHG | HEART RATE: 77 BPM | TEMPERATURE: 98.3 F | OXYGEN SATURATION: 98 % | SYSTOLIC BLOOD PRESSURE: 130 MMHG

## 2021-03-30 RX ORDER — HYDROCODONE BITARTRATE AND ACETAMINOPHEN 5; 325 MG/1; MG/1
1 TABLET ORAL EVERY 6 HOURS PRN
Qty: 8 TABLET | Refills: 0 | Status: SHIPPED | OUTPATIENT
Start: 2021-03-30 | End: 2021-04-05

## 2021-03-30 RX ADMIN — DOCUSATE SODIUM 100 MG: 100 CAPSULE ORAL at 08:04

## 2021-03-30 RX ADMIN — IBUPROFEN 600 MG: 600 TABLET, FILM COATED ORAL at 08:04

## 2021-03-31 ENCOUNTER — HOSPITAL ENCOUNTER (OUTPATIENT)
Dept: LACTATION | Facility: HOSPITAL | Age: 31
Discharge: HOME | End: 2021-03-31

## 2021-04-01 ENCOUNTER — HOSPITAL ENCOUNTER (OUTPATIENT)
Dept: LACTATION | Facility: HOSPITAL | Age: 31
Discharge: HOME | End: 2021-04-01

## 2021-04-01 NOTE — LACTATION NOTE
"Mother's Name:                       Corrine Conte  Contact Number:                     [phone]  G/P:                                         3/1  (twin demise at 23 weeks)  Breastfeeding Hx:                   None  Significant Medical History:  Maternal Breast Assessment:       Infant's Name:                       Sean Conte (aleksandar)  Date of Birth:                           [birth year]  Gestational age at Birth:         40w3d  Age:                                         4 days  Physician:                                Nimo Dietrich                     Reason for Visit:                     Weight and transfer eval                     Infant's Birth weight:               8-15.2  4060 g  Previous weight:                     8-7.9    3852 g             Wt Loss:  5.1%  Last weight:   8-2.6 3702 g  Wt Loss:  8.8%     Today's Weight:            8-1.2 3664 g  Wt Loss:  9.8%    Feeding History Since Discharge/Last Lactation Appt.:  Mother has been bfing and also giving EBM per syringe. Maximum amount at a pumping session was 10 mls. This given, per medicine feeder, after feeds last night. Mother has felt bf's have gone well today, so no supplements have been given. Double electric pump, Ameda, used with no pain.     Medial issue:  Mother concerned with red discharge from infant's vagina. Explained this could be caused from maternal hormones en utero. Same observed; thick, gelatinous, dark red discharge, not \"brick dust\" (uric acid crystals)  in appearance; small quantity. Parents with pictures of same. Have spoken to MD office about this. Will see Ped tomorrow.      Past 24 Hours Voids/Stools:   4-5 / 1     Color of Stool:  black           Right Breast:   17-19 mins 3670 g  8-1.4 + 6 mls  Left Breast:      15 mins 3676 g  8-1.6 + 6 mls                             Total Minutes:      32      Total Weight Gain:    12 mls     Average Feeding Amount for Age: 30-45 mls or 1- 1 1/2 ounces, 8-12 times in 24 hours. "      Interventions: Observed bfing session. Mother able to latch deeply with no assistance. Minor help given to improve positioning with infant's body in alignment. Mom compressing breast well entire feed. Noted suckling bursts, followed by cessation of suckling, rather than a swallow. One-two swallows noted while at breast.  RTF formula and slow flow nipple given with instructions for use.      Education: Need for supplements after every feed to avoid infant losing more weight; signs of swallowing, pumping after every feed, paced bottle feeding, Increasing milk supply, power pumping. Handouts given.     Notified MD/ Orders Received: N/A; will see Ped tomorrow.      Feeding Plan:   Feed at first cues going no longer than 3 hours between feeds.  Limit time at breast to 15 mins each side, UNLESS she is very obviously swallowing milk.  Give her EBM / formula supplement after every bf; minimum of 15-30 mls, more if she seems to want it.   Use paced bottle-feeding method as discussed.   Pump right after bfing for 20 mins to have milk for a supplement. Interrupt pump for 1-2 mins, massage, then cont pumping.   Power pump if desired.   Continue to hold skin to skin often.      Plan of Care:  Interventions require further assessment with [facility] Lactation     Future Appointments:     Lactation:        Monday, 2 PM, 4/5/21     Physician:        Dr. Dietrich, 4/2/21     Signature:        YANDEL Pereira     Faxed to:         Dr. Dietrich     Date:                4/1/21

## 2021-04-05 ENCOUNTER — HOSPITAL ENCOUNTER (OUTPATIENT)
Dept: LACTATION | Facility: HOSPITAL | Age: 31
Discharge: HOME | End: 2021-04-05

## 2021-04-05 NOTE — LACTATION NOTE
Mother's Name:                       Corrine Conte  Contact Number:                     [phone]  G/P:                                         3/1  (twin demise at 23 weeks)  Breastfeeding Hx:                   None  Significant Medical History:  Maternal Breast Assessment:  bilateral fullness, healing minor trama on right nipple.     Infant's Name:                       Sean Conte (aleksandar)  Date of Birth:                           [birth year]  Gestational age at Birth:         40w3d  Age:                                         8 days  Physician:                                Nimo Dietrich                     Reason for Visit:                     Weight and transfer eval                     Infant's Birth weight:  8-15.2  4060 g  Discharge weight  8-7.9    3852 g             Wt Loss:  5.1%  Previous weight:  8-2.6    3702 g             Wt Loss:  8.8%  Last weight:    8-1.2 3664 g  Wt Loss:  9.8%     Today's Weight:                     8-8.3 3864 g  Wt Loss:   4.8%  GREAT IMPROVEMENT!!!    Feeding History Since Discharge/Last Lactation Appt.:  All feeds are at breast followed by a supplement of EBM 20-30 mls. Mom pumps after all feeds except for 2 of the night feeds. Parents have become proficient at keeping Sean active for feeds. No formula has been given. Corrine is pumping  mls each session. Sessions FOLLOW a bfing session. She also easily collects 2 oz in [place] while bfing.      Past 24 Hours Voids/Stools: 8 / 6    Color of Stool:  yellow          Left Breast: 15 mins 8-12.3 3978 g  + 114 mls or 4 oounces!! PHENOMENAL!!   Left Breast:  7 mins 8-12.5 3990 g  + 12 mls     Sean also bf'ed 2 hours prior to appmt and took a 30 ml EBM bottle afterward!                               Total Minutes:      22 mins    Total Weight Gain: 126  or 4 oz        Average Feeding Amount for Age:60-90 mls or 2-3 ounces, 8-12 times in 24 hours.      Interventions: NONE-- parents independently able to position and latch.  Both now can discern swallowing, and hear same. Pre/post feed weights obtained.     Education:  average feed amounts, pumping to comfort, storing for return to work., storing guidelines.      Feeding Plan:   Feed at cues, going no longer than 3 hours between feeds.   Pump as desired/ discussed according to your own goals.  Never allow fullness or knots to be in breasts.     Plan of Care:  Interventions require further assessment with [facility] Lactation     Future Appointments:     Lactation:        as desired by parents     Physician:        Dr. Dietrich,  4/12/21     Signature:        YANDEL Pereira     Faxed to:         Dr. Dietrich     Date:                4/5/21

## 2021-04-08 ENCOUNTER — TELEPHONE (OUTPATIENT)
Dept: OBSTETRICS AND GYNECOLOGY | Facility: CLINIC | Age: 31
End: 2021-04-08

## 2021-04-08 RX ORDER — FLUCONAZOLE 150 MG/1
150 TABLET ORAL ONCE
Qty: 1 TABLET | Refills: 0 | Status: SHIPPED | OUTPATIENT
Start: 2021-04-08 | End: 2021-04-08

## 2021-04-08 NOTE — TELEPHONE ENCOUNTER
Pt called c/o external vag itching since yest. Pt delivered 3/28. Pt states it is not irritation from the pads, she thinks she has yeast infec and is requesting Diflucan. Pt uses Brittanie Marley. Please advise.

## 2021-05-11 ENCOUNTER — POSTPARTUM VISIT (OUTPATIENT)
Dept: OBSTETRICS AND GYNECOLOGY | Facility: CLINIC | Age: 31
End: 2021-05-11

## 2021-05-11 VITALS
HEIGHT: 67 IN | DIASTOLIC BLOOD PRESSURE: 78 MMHG | WEIGHT: 161 LBS | BODY MASS INDEX: 25.27 KG/M2 | SYSTOLIC BLOOD PRESSURE: 116 MMHG

## 2021-05-11 PROCEDURE — 0503F POSTPARTUM CARE VISIT: CPT | Performed by: OBSTETRICS & GYNECOLOGY

## 2021-05-11 NOTE — PROGRESS NOTES
"Corrine Conte is here for a postpartum visit after a vaginal delivery 6 weeks ago.  The depression questionnaire has been completed.  She has no signs of postpartum depression today.  She has not had a period since her delivery and is breast-feeding her infant without difficulty.  Her last Pap smear was 1/2019 and normal.  She has no history of cervical dysplasia.  She plans on using condoms for contraception.    /78 (BP Location: Right arm, Patient Position: Sitting, Cuff Size: Adult)   Ht 170.2 cm (67\")   Wt 73 kg (161 lb)   Breastfeeding Yes   BMI 25.22 kg/m²    In general pleasant female no acute distress  Neck no thyromegaly  Breasts without erythema tenderness or masses  Abdomen soft and nontender  A Pap smear was not performed.     Assessment: Normal postpartum exam.    We have discussed current Pap smear screening guidelines. She will contact the office if she desires hormonal contraception. Corrine will return in 8 months or sooner if needed.  "

## 2022-01-17 ENCOUNTER — OFFICE VISIT (OUTPATIENT)
Dept: OBSTETRICS AND GYNECOLOGY | Facility: CLINIC | Age: 32
End: 2022-01-17

## 2022-01-17 VITALS
SYSTOLIC BLOOD PRESSURE: 112 MMHG | BODY MASS INDEX: 24.17 KG/M2 | WEIGHT: 154 LBS | HEIGHT: 67 IN | DIASTOLIC BLOOD PRESSURE: 82 MMHG

## 2022-01-17 DIAGNOSIS — Z12.4 SCREENING FOR CERVICAL CANCER: ICD-10-CM

## 2022-01-17 DIAGNOSIS — Z01.419 ENCOUNTER FOR GYNECOLOGICAL EXAMINATION WITHOUT ABNORMAL FINDING: Primary | ICD-10-CM

## 2022-01-17 PROCEDURE — 87624 HPV HI-RISK TYP POOLED RSLT: CPT | Performed by: OBSTETRICS & GYNECOLOGY

## 2022-01-17 PROCEDURE — 99395 PREV VISIT EST AGE 18-39: CPT | Performed by: OBSTETRICS & GYNECOLOGY

## 2022-01-17 PROCEDURE — G0123 SCREEN CERV/VAG THIN LAYER: HCPCS | Performed by: OBSTETRICS & GYNECOLOGY

## 2022-01-17 NOTE — PROGRESS NOTES
"CC: annual exam    SUBJECTIVE: Corrine Conte is a 31 y.o. female , para 2, who comes to the office today for annual GYN examination. Last menstrual period was 1 week ago and her last Pap smear was 1/2019, and was normal. She has no history of cervical dysplasia. She is currently on a prenatal viatmin and is thinking about conception in the near future. Her medical history is reviewed.     HPI      Social History     Tobacco Use   • Smoking status: Never Smoker   • Smokeless tobacco: Never Used   Substance Use Topics   • Alcohol use: No   • Drug use: No        Review of Systems   Constitutional: Negative for fever.   Respiratory: Negative for cough.    Genitourinary: Negative for menstrual problem.   Hematological: Does not bruise/bleed easily.       Visit Vitals  /82 (BP Location: Left arm, Patient Position: Sitting)   Ht 170.2 cm (67\")   Wt 69.9 kg (154 lb)   LMP 01/12/2022 (Exact Date)   Breastfeeding Yes   BMI 24.12 kg/m²      Objective   Physical Exam  Vitals and nursing note reviewed. Exam conducted with a chaperone present.   Constitutional:       General: She is not in acute distress.     Appearance: She is well-developed.   HENT:      Head: Normocephalic and atraumatic.   Cardiovascular:      Rate and Rhythm: Normal rate and regular rhythm.      Heart sounds: No murmur heard.      Pulmonary:      Effort: Pulmonary effort is normal.      Breath sounds: Normal breath sounds.   Chest:   Breasts:      Right: No inverted nipple or mass.      Left: No inverted nipple or mass.       Abdominal:      General: There is no distension.      Palpations: Abdomen is soft.      Tenderness: There is no abdominal tenderness.   Genitourinary:     General: Normal vulva.      Exam position: Lithotomy position.      Pubic Area: No rash.       Labia:         Right: No tenderness or lesion.         Left: No tenderness or lesion.       Vagina: Normal. No vaginal discharge, tenderness or bleeding.      Cervix: No cervical " motion tenderness, discharge or friability.      Uterus: Normal.       Adnexa:         Right: No tenderness or fullness.          Left: No tenderness or fullness.        Comments: Pap done  Musculoskeletal:         General: Normal range of motion.      Cervical back: Normal range of motion and neck supple.   Skin:     General: Skin is warm and dry.   Neurological:      Mental Status: She is alert and oriented to person, place, and time.   Psychiatric:         Behavior: Behavior normal.         Judgment: Judgment normal.       Assessment/Plan   Diagnoses and all orders for this visit:    1. Encounter for gynecological examination without abnormal finding (Primary)    2. Screening for cervical cancer  -     Liquid-based Pap Smear, Screening      We will notify her when the Pap smear results are available. We have discussed current Pap smear screening guidelines.  She will return in one year. In the meantime if she develops questions or problems, she will notify the office.

## 2022-01-19 LAB
GEN CATEG CVX/VAG CYTO-IMP: NORMAL
HPV I/H RISK 4 DNA CVX QL PROBE+SIG AMP: NOT DETECTED
LAB AP CASE REPORT: NORMAL
LAB AP GYN ADDITIONAL INFORMATION: NORMAL
LAB AP GYN OTHER FINDINGS: NORMAL
PATH INTERP SPEC-IMP: NORMAL
STAT OF ADQ CVX/VAG CYTO-IMP: NORMAL

## 2022-08-12 ENCOUNTER — INITIAL PRENATAL (OUTPATIENT)
Dept: OBSTETRICS AND GYNECOLOGY | Facility: CLINIC | Age: 32
End: 2022-08-12

## 2022-08-12 VITALS — BODY MASS INDEX: 24.59 KG/M2 | WEIGHT: 157 LBS | DIASTOLIC BLOOD PRESSURE: 72 MMHG | SYSTOLIC BLOOD PRESSURE: 106 MMHG

## 2022-08-12 DIAGNOSIS — Z98.890 HISTORY OF CERVICAL LEEP BIOPSY AFFECTING CARE OF MOTHER, ANTEPARTUM: ICD-10-CM

## 2022-08-12 DIAGNOSIS — Z78.9 NON-SMOKER: ICD-10-CM

## 2022-08-12 DIAGNOSIS — O21.9 NAUSEA AND VOMITING DURING PREGNANCY: ICD-10-CM

## 2022-08-12 DIAGNOSIS — Z34.81 PRENATAL CARE, SUBSEQUENT PREGNANCY, FIRST TRIMESTER: Primary | ICD-10-CM

## 2022-08-12 DIAGNOSIS — O34.40 HISTORY OF CERVICAL LEEP BIOPSY AFFECTING CARE OF MOTHER, ANTEPARTUM: ICD-10-CM

## 2022-08-12 DIAGNOSIS — O09.891 HISTORY OF PRETERM DELIVERY, CURRENTLY PREGNANT, FIRST TRIMESTER: ICD-10-CM

## 2022-08-12 PROCEDURE — 0501F PRENATAL FLOW SHEET: CPT | Performed by: ADVANCED PRACTICE MIDWIFE

## 2022-08-12 RX ORDER — METOCLOPRAMIDE 10 MG/1
10 TABLET ORAL
Qty: 120 TABLET | Refills: 1 | Status: SHIPPED | OUTPATIENT
Start: 2022-08-12 | End: 2022-10-18

## 2022-08-12 RX ORDER — METOCLOPRAMIDE 10 MG/1
10 TABLET ORAL
COMMUNITY
End: 2022-08-12 | Stop reason: SDUPTHER

## 2022-08-12 NOTE — PROGRESS NOTES
32-year old patient arrived to initiate prenatal care.     HPI: 32-year old . Patient's last menstrual period was 2022. Reports nausea and vomiting daily. Pre-pregnancy weight of 154 pounds.    Previous prenatal history significant for: prolonged PPROM with chorioamnionitis with twin gestation, resulting in  vaginal delivery at 23w1d; SAB x 1; TSVD x 1  History significant for: history of abnormal pap smear with HPV detected; LEEP    The following portion of the patient's history were reviewed and updated as needed: allergies, current medications, past family history, past medical history, social history, surgical history, and problem list.    ROS: All systems reviewed and are negative with exception of the following: anxious, nausea, vomiting      US ordered today, reviewed and shows IUP of 9w0d gestation and Estimated Date of Delivery: 3/17/23  Last Pap Smear: 2022 NILM; HPV not detected    Exam:  Wt: 157 lb for TWG of 1.361 kg (3 lb), B/P 106/72, FHTs 181   General Appearance:  healthy-appearing .  HEENT:  NCAT, EOMI, neck supple, no thyroidmegaly.  HR str and reg. No murmur. Lungs clear. Resp even and unlabored.  Abd: Soft, nontender. Bowel sounds active. Uterus is consistent with EGA.  Ext: NT, nontender. No cyanosis or edema.    Diagnoses and all orders for this visit:    1. Prenatal care, subsequent pregnancy, first trimester (Primary)  - Reviewed information in new OB packet, including weight gain in pregnancy, OTC medications for use during pregnancy, first timester of pregnancy and discomforts, regular OB routine, ffDNA and maternal carrier screening testing.  First Trimester of Pregnancy video included in AVS.  - Advised to maintain regular activity.  - Reviewed and encouraged pt to report vaginal bleeding, pelvic pain or cramping, any prolonged illness or infection, or any other concerns.  - RTO in 4 weeks with Dr. Flores (per patient request) and as needed with  concerns  - Discussed and ordered initial prenatal labs today. Patient left urine today but plans to return next week and have genetic screening drawn with prenatal labs.   -     Ambulatory Referral to Perinatology  -     ToxASSURE Select 13 (MW) - Urine, Clean Catch  -     Urine Culture - Urine, Urine, Clean Catch  -     Chlamydia trachomatis, Neisseria gonorrhoeae, PCR - Urine, Urine, Clean Catch  -     ABO / Rh  -     Antibody Screen  -     CBC & Differential  -     Hepatitis B Surface Antigen  -     Hepatitis C Antibody  -     HIV-1 / O / 2 Ag / Antibody 4th Generation  -     RPR  -     Rubella Antibody, IgG    2. Nausea and vomiting during pregnancy  - Discussed nonpharmacologic nausea relief measures, including small frequent meals, dry crackers upon rising, carbonated beverages, food/drink containing ginger (ginger ale, tea, gum), avoidance of triggers such as smells, accupressure wrist bands, rest, pregnancy pops, sour candies, aromatherapy. Morning Sickness education included in the AVS.  - Encouraged adequate hydration. Discussed signs of dehydration.  - Counseled on OTC pharmacologic tx with Vitamin B6 25 mg po and Doxylamine and instructions included in the AVS.  - Call if symptoms fail to improve or worsen.  - Patient has been taking reglan, which she relates has been helping. Refill prescription sent.  -     metoclopramide (REGLAN) 10 MG tablet; Take 1 tablet by mouth 4 (Four) Times a Day Before Meals & at Bedtime.  Dispense: 120 tablet; Refill: 1    3. History of  delivery, currently pregnant, first trimester  - Previous delivery at 23-weeks with twin gestation.    4. History of cervical LEEP biopsy affecting care of mother, antepartum    5. Non-smoker        This note has been signed electronically.    Nata Hughes, DNP, APRN, CNM, RNC-OB

## 2022-08-15 PROBLEM — O09.299 PRIOR PREGNANCY WITH FETAL DEMISE: Status: ACTIVE | Noted: 2022-08-15

## 2022-08-15 PROBLEM — O09.899 HISTORY OF PRETERM DELIVERY, CURRENTLY PREGNANT: Status: ACTIVE | Noted: 2022-08-15

## 2022-08-19 LAB
ABO GROUP BLD: NORMAL
BACTERIA UR CULT: NO GROWTH
BACTERIA UR CULT: NORMAL
BASOPHILS # BLD AUTO: 0.01 10*3/MM3 (ref 0–0.2)
BASOPHILS NFR BLD AUTO: 0.1 % (ref 0–1.5)
BLD GP AB SCN SERPL QL: NEGATIVE
C TRACH RRNA SPEC QL NAA+PROBE: NEGATIVE
DRUGS UR: NORMAL
EOSINOPHIL # BLD AUTO: 0.15 10*3/MM3 (ref 0–0.4)
EOSINOPHIL NFR BLD AUTO: 1.9 % (ref 0.3–6.2)
ERYTHROCYTE [DISTWIDTH] IN BLOOD BY AUTOMATED COUNT: 12.8 % (ref 12.3–15.4)
HBV SURFACE AG SERPL QL IA: NEGATIVE
HCT VFR BLD AUTO: 36.6 % (ref 34–46.6)
HCV AB S/CO SERPL IA: <0.1 S/CO RATIO (ref 0–0.9)
HGB BLD-MCNC: 12.4 G/DL (ref 12–15.9)
HIV 1+2 AB+HIV1 P24 AG SERPL QL IA: NON REACTIVE
IMM GRANULOCYTES # BLD AUTO: 0.02 10*3/MM3 (ref 0–0.05)
IMM GRANULOCYTES NFR BLD AUTO: 0.3 % (ref 0–0.5)
LYMPHOCYTES # BLD AUTO: 1.31 10*3/MM3 (ref 0.7–3.1)
LYMPHOCYTES NFR BLD AUTO: 16.5 % (ref 19.6–45.3)
MCH RBC QN AUTO: 31.7 PG (ref 26.6–33)
MCHC RBC AUTO-ENTMCNC: 33.9 G/DL (ref 31.5–35.7)
MCV RBC AUTO: 93.6 FL (ref 79–97)
MONOCYTES # BLD AUTO: 0.58 10*3/MM3 (ref 0.1–0.9)
MONOCYTES NFR BLD AUTO: 7.3 % (ref 5–12)
N GONORRHOEA RRNA SPEC QL NAA+PROBE: NEGATIVE
NEUTROPHILS # BLD AUTO: 5.87 10*3/MM3 (ref 1.7–7)
NEUTROPHILS NFR BLD AUTO: 73.9 % (ref 42.7–76)
NRBC BLD AUTO-RTO: 0 /100 WBC (ref 0–0.2)
PLATELET # BLD AUTO: 164 10*3/MM3 (ref 140–450)
RBC # BLD AUTO: 3.91 10*6/MM3 (ref 3.77–5.28)
RH BLD: POSITIVE
RPR SER QL: NON REACTIVE
RUBV IGG SERPL IA-ACNC: 1.17 INDEX
WBC # BLD AUTO: 7.94 10*3/MM3 (ref 3.4–10.8)

## 2022-09-12 ENCOUNTER — ROUTINE PRENATAL (OUTPATIENT)
Dept: OBSTETRICS AND GYNECOLOGY | Facility: CLINIC | Age: 32
End: 2022-09-12

## 2022-09-12 VITALS — BODY MASS INDEX: 24.9 KG/M2 | DIASTOLIC BLOOD PRESSURE: 68 MMHG | WEIGHT: 159 LBS | SYSTOLIC BLOOD PRESSURE: 102 MMHG

## 2022-09-12 DIAGNOSIS — O09.892 HISTORY OF PRETERM DELIVERY, CURRENTLY PREGNANT, SECOND TRIMESTER: Primary | ICD-10-CM

## 2022-09-12 LAB
GLUCOSE UR STRIP-MCNC: NEGATIVE MG/DL
PROT UR STRIP-MCNC: NEGATIVE MG/DL

## 2022-09-12 PROCEDURE — 0502F SUBSEQUENT PRENATAL CARE: CPT | Performed by: OBSTETRICS & GYNECOLOGY

## 2022-09-12 NOTE — PROGRESS NOTES
Feeling well, no nausea  Reviewed normal prenatal labs  Discussed normal ffDNA, GIRL    Diagnoses and all orders for this visit:    1. History of  delivery, currently pregnant, second trimester (Primary)

## 2022-10-03 NOTE — LACTATION NOTE
Bereavement Note    Consulted with ptCorrine and , Neil in L&D. Each tearful, but calm. They report have support of family and a Confucianism. They prefer to talk with their own  and declined offered to have [facility]  visit them. They voiced much appreciation for staff caring for them and their daughters. Memory Box for each daughter assembled by RN. Gave both to parents and encouraged them to look through items together. Pledged prayer support. Parents grateful.     Resources  Gave Corrine handouts on grief and loss resources; local groups and online options, and contact card for Bereavement Coord.    Suppression of Lactation  Explained options of pumping milk and donating to a milk bank if she wished,  or how to suppress her milk if that was her desire. Specifics given. Urged her to call Lact Dept for any problems with breasts if needed. Informed GENESIS HORNE of all.    Tranexamic Acid Counseling:  Patient advised of the small risk of bleeding problems with tranexamic acid. They were also instructed to call if they developed any nausea, vomiting or diarrhea. All of the patient's questions and concerns were addressed.

## 2022-10-18 ENCOUNTER — ROUTINE PRENATAL (OUTPATIENT)
Dept: OBSTETRICS AND GYNECOLOGY | Facility: CLINIC | Age: 32
End: 2022-10-18

## 2022-10-18 VITALS — SYSTOLIC BLOOD PRESSURE: 118 MMHG | WEIGHT: 160 LBS | BODY MASS INDEX: 25.06 KG/M2 | DIASTOLIC BLOOD PRESSURE: 68 MMHG

## 2022-10-18 DIAGNOSIS — O09.892 HISTORY OF PRETERM DELIVERY, CURRENTLY PREGNANT, SECOND TRIMESTER: ICD-10-CM

## 2022-10-18 DIAGNOSIS — Z71.85 IMMUNIZATION COUNSELING: ICD-10-CM

## 2022-10-18 DIAGNOSIS — Z78.9 NON-SMOKER: ICD-10-CM

## 2022-10-18 DIAGNOSIS — Z34.82 PRENATAL CARE, SUBSEQUENT PREGNANCY, SECOND TRIMESTER: Primary | ICD-10-CM

## 2022-10-18 DIAGNOSIS — Z98.890 HISTORY OF CERVICAL LEEP BIOPSY AFFECTING CARE OF MOTHER, ANTEPARTUM: ICD-10-CM

## 2022-10-18 DIAGNOSIS — O34.40 HISTORY OF CERVICAL LEEP BIOPSY AFFECTING CARE OF MOTHER, ANTEPARTUM: ICD-10-CM

## 2022-10-18 LAB
GLUCOSE UR STRIP-MCNC: NEGATIVE MG/DL
PROT UR STRIP-MCNC: NEGATIVE MG/DL

## 2022-10-18 PROCEDURE — 0502F SUBSEQUENT PRENATAL CARE: CPT | Performed by: ADVANCED PRACTICE MIDWIFE

## 2022-10-18 PROCEDURE — 90686 IIV4 VACC NO PRSV 0.5 ML IM: CPT | Performed by: ADVANCED PRACTICE MIDWIFE

## 2022-10-18 PROCEDURE — 90471 IMMUNIZATION ADMIN: CPT | Performed by: ADVANCED PRACTICE MIDWIFE

## 2022-10-18 NOTE — PROGRESS NOTES
Reason for visit: Routine OB visit at 18w4d     CC:  32-yr old  here for routine OBV at 18w4d.  ADELE 3/17/2023, by Ultrasound.  Patient reports feeling fetal movement. She reports she still gets anxious during pregnancy her prenatal history. She denies VB, LOF, contractions, or other concerns. Also denies h/a, visual disturbances, and epigastric pain.     ROS: All systems reviewed and are negative with exception of the following: anxious    Wt 160 lb for a TWG of 2.722 kg (6 lb), /68, FHTs 134  Urine today and reviewed: negative glucose, negative protein    20-week Anatomy Scan: scheduled for 10/31/2022    Exam:  General Appearance:  Healthy appearing . Normal mood and behavior.  HEENT: NCAT, EOMI  HR str and reg. Lungs clear. Resp even and unlabored.  Abdomen is soft and nontender. Bowel sounds active. Uterus is consistent with EGA  Ext: no edema, nontender, no trauma, or cyanosis.    Impression  Diagnoses and all orders for this visit:    1. Prenatal care, subsequent pregnancy, second trimester (Primary)  - Discussed second trimester of pregnancy, discomforts and measures of support, fetal movement, pelvic pain warnings, bleeding warnings, and signs and symptoms to report. Second Trimester of Pregnancy video included in the AVS. Round Ligament Pain education included in the AVS.  - Discussed ffDNA screening results.  - Discussed and encouraged to call or come to the hospital with vaginal bleeding, leaking of fluid, pelvic pain, or any other concerns.  - Return to the office in 4 weeks for a routine OB visit with Dr. Flores and as needed with concerns.    2. Immunization counseling  - Discussed influenza vaccine recommendations during pregnancy. Patient consents to receive the influenza immunization today in the clinic. Influenza (Flu) Vaccine (Inactivated or Recombinant): What You Need to Know (VIS) education included in the AVS.  -     FluLaval/Fluzone >6 mos ([id number])    3. History of   delivery, currently pregnant, second trimester  - Previous  delivery at 23-weeks with twin gestation.    4. History of cervical LEEP biopsy affecting care of mother, antepartum  - History of procedure      5. Non-smoker          This note has been signed electronically.    Nata Hughes, DNP, APRN, CNM, RNC-OB

## 2022-11-15 ENCOUNTER — ROUTINE PRENATAL (OUTPATIENT)
Dept: OBSTETRICS AND GYNECOLOGY | Facility: CLINIC | Age: 32
End: 2022-11-15

## 2022-11-15 VITALS — SYSTOLIC BLOOD PRESSURE: 126 MMHG | WEIGHT: 165 LBS | DIASTOLIC BLOOD PRESSURE: 76 MMHG | BODY MASS INDEX: 25.84 KG/M2

## 2022-11-15 DIAGNOSIS — O09.892 HISTORY OF PRETERM DELIVERY, CURRENTLY PREGNANT, SECOND TRIMESTER: ICD-10-CM

## 2022-11-15 DIAGNOSIS — Z34.82 PRENATAL CARE, SUBSEQUENT PREGNANCY, SECOND TRIMESTER: Primary | ICD-10-CM

## 2022-11-15 LAB
GLUCOSE UR STRIP-MCNC: ABNORMAL MG/DL
PROT UR STRIP-MCNC: ABNORMAL MG/DL

## 2022-11-15 PROCEDURE — 0502F SUBSEQUENT PRENATAL CARE: CPT | Performed by: OBSTETRICS & GYNECOLOGY

## 2022-11-15 RX ORDER — FLUTICASONE PROPIONATE 50 MCG
SPRAY, SUSPENSION (ML) NASAL
COMMUNITY
End: 2022-12-13

## 2022-11-15 NOTE — PROGRESS NOTES
Good fetal movement  Reviewed normal anatomy US  Glucola and Hgb ordered for next visit, Glucose noted in urine today  Discussed possible dizzy spells and ensuring adequate hydration    Diagnoses and all orders for this visit:    1. Prenatal care, subsequent pregnancy, second trimester (Primary)    2. History of  delivery, currently pregnant, second trimester

## 2022-11-28 ENCOUNTER — TELEPHONE (OUTPATIENT)
Dept: OBSTETRICS AND GYNECOLOGY | Facility: CLINIC | Age: 32
End: 2022-11-28

## 2022-11-28 NOTE — TELEPHONE ENCOUNTER
Caller: Corrine Conte    Relationship to patient: Self    Best call back number: [phone]    Patient is needing: PT CALLED IN AS SHE IS EXPERIENCING PUBIC SYMPHYSIS PAIN AND NOTED THAT [facility] MENTIONED A PELVIC BELT BUT PT DID NOT KNOW WHICH ONE SHE NEEDED AND ALSO IF THERE WAS SOMETHING SHE COULD DO TO BE PROACTIVE (IE: CHIRO OR PT) IF POSSIBLE. PT IS WANTING TO SPEAK WITH SOMEONE ABOUT THIS. PT IS AVAILABLE ANYTIME FOR CALL BACK AND A MESSAGE CAN BE LEFT IF SHE IS UNABLE TO ANSWER.

## 2022-11-29 NOTE — TELEPHONE ENCOUNTER
Pt will contact insurance for them to send us an order for pregnancy support band they will cover. She will try chiropractor and prenatal massage.

## 2022-12-13 ENCOUNTER — ROUTINE PRENATAL (OUTPATIENT)
Dept: OBSTETRICS AND GYNECOLOGY | Facility: CLINIC | Age: 32
End: 2022-12-13

## 2022-12-13 VITALS — SYSTOLIC BLOOD PRESSURE: 120 MMHG | DIASTOLIC BLOOD PRESSURE: 70 MMHG | BODY MASS INDEX: 26.94 KG/M2 | WEIGHT: 172 LBS

## 2022-12-13 DIAGNOSIS — O26.899 PREGNANCY RELATED HIP PAIN, ANTEPARTUM: ICD-10-CM

## 2022-12-13 DIAGNOSIS — M25.559 PREGNANCY RELATED HIP PAIN, ANTEPARTUM: ICD-10-CM

## 2022-12-13 DIAGNOSIS — Z34.82 PRENATAL CARE, SUBSEQUENT PREGNANCY, SECOND TRIMESTER: Primary | ICD-10-CM

## 2022-12-13 DIAGNOSIS — Z71.85 IMMUNIZATION COUNSELING: ICD-10-CM

## 2022-12-13 DIAGNOSIS — Z78.9 NON-SMOKER: ICD-10-CM

## 2022-12-13 LAB
GLUCOSE UR STRIP-MCNC: NEGATIVE MG/DL
PROT UR STRIP-MCNC: ABNORMAL MG/DL

## 2022-12-13 PROCEDURE — 0502F SUBSEQUENT PRENATAL CARE: CPT | Performed by: ADVANCED PRACTICE MIDWIFE

## 2022-12-13 NOTE — PROGRESS NOTES
Reason for visit: Routine OB visit at 26w4d     CC:  32-yr old  here for routine OBV at 26w4d.  ADELE 3/17/2023, by Ultrasound.  Patient reports good fetal movement and denies VB, LOF, contractions, or other concerns. Also denies h/a, visual disturbances, and epigastric pain. Still having discomfort in right groin/hip area. Will be seeing chiropractor again tomorrow and has a maternity support belt being delivered.    ROS: All systems reviewed and are negative with exception of the following: right hip/groin pain    Wt 172 lb for a TWG of 8.165 kg (18 lb), /70, FHTs 142, FH 26 cm  Urine today and reviewed: negative glucose, trace protein    Maternal carrier screening: negative  ffDNA screening: low risk    20-week (2022) Anatomy Scan: normal; anterior placenta without evidence of placenta previa    Exam:  General Appearance:  Healthy appearing . Normal mood and behavior.  HEENT: NCAT, EOMI  HR str and reg. Lungs clear. Resp even and unlabored.  Abdomen is soft and nontender. Bowel sounds active. Uterus is consistent with EGA  Ext: no edema, nontender, no trauma, or cyanosis.    Impression  Diagnoses and all orders for this visit:    1. Prenatal care, subsequent pregnancy, second trimester (Primary)  - Discussed third trimester of pregnancy, including discomforts and measures of support,  labor warnings, preeclampsia warnings, and signs and symptoms to report. Third trimester of Pregnancy video included in the AVS.  - Discussed and encouraged to come to the hospital for vaginal bleeding, leaking of fluid, contractions, or any other concerns.  - Return to office in 2 weeks for routine OB visit with Dr. Flores with 4D ultrasound as she did not receive one today and as needed with concerns.  - Discussed glucose screening and checking hemoglobin for anemia in third trimester for today.  - Labs and orders for today:  -     Gestational Screen 1 Hr ([facility])  -     Hemoglobin    2. Pregnancy  related hip pain, antepartum  - Patient has a maternal support belt being shipped and presently sees a chiropractor. Discussed measures of support, including heat, Tylenol, stretches, massage, chiropractic care, physical therapy consultation for evaluation and treatment, and OMT. Patient will notify provider if chiropractic care does not help and if she would desire referral for OMT or physical therapy. Back Pain in Pregnancy education included in the AVS.    3. Immunization counseling  - Discussed Tdap immunization recommendations during pregnancy. Patient to consider the Tdap immunization for her next clinic visit. Tdap (Tetanus, Diptheria, Pertussis) Vaccine: What You Need to Know (VIS) education included in the AVS.    4. Non-smoker          This note has been signed electronically.    Nata Hughes DNP, APRN, CNM, RNC-OB

## 2022-12-14 DIAGNOSIS — O99.810 ABNORMAL GLUCOSE TOLERANCE TEST IN PREGNANCY, ANTEPARTUM: Primary | ICD-10-CM

## 2022-12-14 LAB
GLUCOSE 1H P 50 G GLC PO SERPL-MCNC: 152 MG/DL (ref 65–139)
HGB BLD-MCNC: 11.6 G/DL (ref 12–15.9)

## 2022-12-30 LAB
GLUCOSE 1H P 100 G GLC PO SERPL-MCNC: 160 MG/DL (ref 65–179)
GLUCOSE 2H P 100 G GLC PO SERPL-MCNC: 130 MG/DL (ref 65–154)
GLUCOSE 3H P 100 G GLC PO SERPL-MCNC: 124 MG/DL (ref 65–139)
GLUCOSE P FAST SERPL-MCNC: 87 MG/DL (ref 65–94)

## 2023-01-03 ENCOUNTER — ROUTINE PRENATAL (OUTPATIENT)
Dept: OBSTETRICS AND GYNECOLOGY | Facility: CLINIC | Age: 33
End: 2023-01-03
Payer: COMMERCIAL

## 2023-01-03 VITALS — SYSTOLIC BLOOD PRESSURE: 110 MMHG | DIASTOLIC BLOOD PRESSURE: 72 MMHG | WEIGHT: 175 LBS | BODY MASS INDEX: 27.41 KG/M2

## 2023-01-03 DIAGNOSIS — O09.893 HISTORY OF PRETERM DELIVERY, CURRENTLY PREGNANT, THIRD TRIMESTER: Primary | ICD-10-CM

## 2023-01-03 LAB
GLUCOSE UR STRIP-MCNC: NEGATIVE MG/DL
PROT UR STRIP-MCNC: NEGATIVE MG/DL

## 2023-01-03 PROCEDURE — 0502F SUBSEQUENT PRENATAL CARE: CPT | Performed by: OBSTETRICS & GYNECOLOGY

## 2023-01-03 PROCEDURE — 90715 TDAP VACCINE 7 YRS/> IM: CPT | Performed by: OBSTETRICS & GYNECOLOGY

## 2023-01-03 PROCEDURE — 90471 IMMUNIZATION ADMIN: CPT | Performed by: OBSTETRICS & GYNECOLOGY

## 2023-01-03 NOTE — PROGRESS NOTES
Good fetal movement  Reviewed normal 3 hour Glucola  Tdap today   labor precautions    Diagnoses and all orders for this visit:    1. History of  delivery, currently pregnant, third trimester (Primary)  -     Tdap Vaccine Greater Than or Equal To 8yo IM

## 2023-01-17 ENCOUNTER — ROUTINE PRENATAL (OUTPATIENT)
Dept: OBSTETRICS AND GYNECOLOGY | Facility: CLINIC | Age: 33
End: 2023-01-17
Payer: COMMERCIAL

## 2023-01-17 VITALS — SYSTOLIC BLOOD PRESSURE: 120 MMHG | BODY MASS INDEX: 27.88 KG/M2 | WEIGHT: 178 LBS | DIASTOLIC BLOOD PRESSURE: 70 MMHG

## 2023-01-17 DIAGNOSIS — O26.899 PREGNANCY RELATED HIP PAIN, ANTEPARTUM: ICD-10-CM

## 2023-01-17 DIAGNOSIS — O09.893 HISTORY OF PRETERM DELIVERY, CURRENTLY PREGNANT, THIRD TRIMESTER: ICD-10-CM

## 2023-01-17 DIAGNOSIS — Z34.83 PRENATAL CARE, SUBSEQUENT PREGNANCY, THIRD TRIMESTER: Primary | ICD-10-CM

## 2023-01-17 DIAGNOSIS — Z78.9 NON-SMOKER: ICD-10-CM

## 2023-01-17 DIAGNOSIS — M25.559 PREGNANCY RELATED HIP PAIN, ANTEPARTUM: ICD-10-CM

## 2023-01-17 DIAGNOSIS — O09.299 PRIOR PREGNANCY WITH FETAL DEMISE: ICD-10-CM

## 2023-01-17 LAB
GLUCOSE UR STRIP-MCNC: NEGATIVE MG/DL
PROT UR STRIP-MCNC: NEGATIVE MG/DL

## 2023-01-17 PROCEDURE — 0502F SUBSEQUENT PRENATAL CARE: CPT | Performed by: ADVANCED PRACTICE MIDWIFE

## 2023-01-17 RX ORDER — ALBUTEROL SULFATE 90 UG/1
AEROSOL, METERED RESPIRATORY (INHALATION) EVERY 4 HOURS
COMMUNITY
End: 2023-01-17

## 2023-01-17 NOTE — PROGRESS NOTES
Reason for visit: Routine OB visit at 31w4d     CC:  32-yr old  here for routine OBV at 31w4d.  ADELE 3/17/2023, by Ultrasound.  Patient reports good fetal movement and denies VB, LOF, contractions, or other concerns. Also denies h/a, visual disturbances, and epigastric pain. She has just finished having influenza A and subsequent sinus infection. Her hip pain has improved with chiropractor support.     ROS: All systems reviewed and are negative with exception of the following: sinus pressure, sinus headache, cough, right hip pain improving, anxious    Wt 178 lb for a TWG of 10.9 kg (24 lb), /70, FHTs 153, FH 31 cm  Urine today and reviewed: neagtive glucose, negative protein    Maternal carrier screening: negative  ffDNA screening: low risk    20-week (2022) Anatomy Scan: normal; anterior placenta without evidence of placenta previa    Exam:  General Appearance:  Healthy appearing . Normal mood and behavior.  HEENT: NCAT, EOMI  HR str and reg. Lungs clear. Resp even and unlabored.  Abdomen is soft and nontender. Bowel sounds active. Uterus is consistent with EGA  Ext: no edema, nontender, no trauma, or cyanosis.    Impression  Diagnoses and all orders for this visit:    1. Prenatal care, subsequent pregnancy, third trimester (Primary)  - Discussed third trimester of pregnancy, discomforts and measures of support, fetal movement counts,  labor warnings, preeclampsia warnings, and signs and symptoms to report. Third Trimester of Pregnancy video included in the AVS.  - Reviewed glucose tolerance test and hemoglobin results with patient.  - Discussed and encouraged to come to the hospital or call with vaginal bleeding, leaking of fluid, contractions, or other concerns.  - Return to the office in two weeks for routine OBV with Dr. Flores and as needed with concerns.    2. History of  delivery, currently pregnant, third trimester    3. Pregnancy related hip pain, antepartum    4.  Prior pregnancy with fetal demise  - History of fetal demise of twins at 23-weeks gestation. Patient to return for interval growth ultrasound.    5. Non-smoker        This note has been signed electronically.    Nata Hughes DNP, APRN, CNM, RNC-OB

## 2023-01-31 ENCOUNTER — TELEPHONE (OUTPATIENT)
Dept: OBSTETRICS AND GYNECOLOGY | Facility: CLINIC | Age: 33
End: 2023-01-31

## 2023-01-31 NOTE — TELEPHONE ENCOUNTER
PT STATES SHE CANNOT COME TO HER APPT TODAY DUE TO WEATHER, PT ASKING IF SHE CAN R/S FOR A TELEHEALTH VISIT OR SCHEDULE FOR A DIFFERENT DAY, PLEASE ADVISE PT.

## 2023-02-03 ENCOUNTER — ROUTINE PRENATAL (OUTPATIENT)
Dept: OBSTETRICS AND GYNECOLOGY | Facility: CLINIC | Age: 33
End: 2023-02-03
Payer: COMMERCIAL

## 2023-02-03 VITALS — DIASTOLIC BLOOD PRESSURE: 78 MMHG | WEIGHT: 183 LBS | SYSTOLIC BLOOD PRESSURE: 118 MMHG | BODY MASS INDEX: 28.66 KG/M2

## 2023-02-03 DIAGNOSIS — Z34.83 PRENATAL CARE, SUBSEQUENT PREGNANCY, THIRD TRIMESTER: Primary | ICD-10-CM

## 2023-02-03 LAB
GLUCOSE UR STRIP-MCNC: NEGATIVE MG/DL
PROT UR STRIP-MCNC: NEGATIVE MG/DL

## 2023-02-03 PROCEDURE — 0502F SUBSEQUENT PRENATAL CARE: CPT | Performed by: OBSTETRICS & GYNECOLOGY

## 2023-02-03 NOTE — PROGRESS NOTES
Good fetal movement  US 1/26 normal growth and fluid  Labor precautions  Reviewed normal 3 hour glucose screening  GBS and cx's next visit    Diagnoses and all orders for this visit:    1. Prenatal care, subsequent pregnancy, third trimester (Primary)

## 2023-02-16 ENCOUNTER — ROUTINE PRENATAL (OUTPATIENT)
Dept: OBSTETRICS AND GYNECOLOGY | Facility: CLINIC | Age: 33
End: 2023-02-16
Payer: COMMERCIAL

## 2023-02-16 VITALS — DIASTOLIC BLOOD PRESSURE: 86 MMHG | WEIGHT: 186 LBS | BODY MASS INDEX: 29.13 KG/M2 | SYSTOLIC BLOOD PRESSURE: 142 MMHG

## 2023-02-16 DIAGNOSIS — Z34.83 PRENATAL CARE, SUBSEQUENT PREGNANCY, THIRD TRIMESTER: Primary | ICD-10-CM

## 2023-02-16 LAB
GLUCOSE UR STRIP-MCNC: NEGATIVE MG/DL
PROT UR STRIP-MCNC: NEGATIVE MG/DL

## 2023-02-16 PROCEDURE — 0502F SUBSEQUENT PRENATAL CARE: CPT | Performed by: OBSTETRICS & GYNECOLOGY

## 2023-02-16 NOTE — PROGRESS NOTES
Good fetal movement  Has had a few contractions  Cervix moderate, posterior  GBS and GC/Chl ordered and done  Labor instructions    Diagnoses and all orders for this visit:    1. Prenatal care, subsequent pregnancy, third trimester (Primary)  -     Chlamydia trachomatis, Neisseria gonorrhoeae, PCR w/ confirmation - Swab, Cervix  -     Strep B Screen - Swab, Vaginal/Rectum

## 2023-02-23 ENCOUNTER — ROUTINE PRENATAL (OUTPATIENT)
Dept: OBSTETRICS AND GYNECOLOGY | Facility: CLINIC | Age: 33
End: 2023-02-23
Payer: COMMERCIAL

## 2023-02-23 VITALS — DIASTOLIC BLOOD PRESSURE: 82 MMHG | BODY MASS INDEX: 28.98 KG/M2 | SYSTOLIC BLOOD PRESSURE: 112 MMHG | WEIGHT: 185 LBS

## 2023-02-23 DIAGNOSIS — Z34.83 PRENATAL CARE, SUBSEQUENT PREGNANCY, THIRD TRIMESTER: Primary | ICD-10-CM

## 2023-02-23 LAB
GLUCOSE UR STRIP-MCNC: NEGATIVE MG/DL
PROT UR STRIP-MCNC: NEGATIVE MG/DL

## 2023-02-23 PROCEDURE — 0502F SUBSEQUENT PRENATAL CARE: CPT | Performed by: OBSTETRICS & GYNECOLOGY

## 2023-02-23 NOTE — PROGRESS NOTES
Good fetal movement  No contractions  Reviewed GBS positive   Labor instructions    Diagnoses and all orders for this visit:    1. Prenatal care, subsequent pregnancy, third trimester (Primary)

## 2023-02-28 ENCOUNTER — ROUTINE PRENATAL (OUTPATIENT)
Dept: OBSTETRICS AND GYNECOLOGY | Facility: CLINIC | Age: 33
End: 2023-02-28
Payer: COMMERCIAL

## 2023-02-28 VITALS — BODY MASS INDEX: 29.44 KG/M2 | WEIGHT: 188 LBS | DIASTOLIC BLOOD PRESSURE: 76 MMHG | SYSTOLIC BLOOD PRESSURE: 120 MMHG

## 2023-02-28 DIAGNOSIS — Z34.83 PRENATAL CARE, SUBSEQUENT PREGNANCY, THIRD TRIMESTER: Primary | ICD-10-CM

## 2023-02-28 LAB
GLUCOSE UR STRIP-MCNC: ABNORMAL MG/DL
PROT UR STRIP-MCNC: NEGATIVE MG/DL

## 2023-02-28 PROCEDURE — 0502F SUBSEQUENT PRENATAL CARE: CPT | Performed by: OBSTETRICS & GYNECOLOGY

## 2023-02-28 NOTE — PROGRESS NOTES
Good fetal movement  No contractions  Reviewed normal 3 hour glucose screen  Cervix no change  Reviewed GBS positive  Labor instructions    Diagnoses and all orders for this visit:    1. Prenatal care, subsequent pregnancy, third trimester (Primary)

## 2023-03-07 ENCOUNTER — ROUTINE PRENATAL (OUTPATIENT)
Dept: OBSTETRICS AND GYNECOLOGY | Facility: CLINIC | Age: 33
End: 2023-03-07
Payer: COMMERCIAL

## 2023-03-07 VITALS — SYSTOLIC BLOOD PRESSURE: 114 MMHG | BODY MASS INDEX: 29.6 KG/M2 | WEIGHT: 189 LBS | DIASTOLIC BLOOD PRESSURE: 82 MMHG

## 2023-03-07 DIAGNOSIS — Z34.83 PRENATAL CARE, SUBSEQUENT PREGNANCY, THIRD TRIMESTER: Primary | ICD-10-CM

## 2023-03-07 LAB
GLUCOSE UR STRIP-MCNC: NEGATIVE MG/DL
PROT UR STRIP-MCNC: NEGATIVE MG/DL

## 2023-03-07 PROCEDURE — 0502F SUBSEQUENT PRENATAL CARE: CPT | Performed by: OBSTETRICS & GYNECOLOGY

## 2023-03-14 ENCOUNTER — ROUTINE PRENATAL (OUTPATIENT)
Dept: OBSTETRICS AND GYNECOLOGY | Facility: CLINIC | Age: 33
End: 2023-03-14
Payer: COMMERCIAL

## 2023-03-14 VITALS — WEIGHT: 194 LBS | BODY MASS INDEX: 30.38 KG/M2 | DIASTOLIC BLOOD PRESSURE: 86 MMHG | SYSTOLIC BLOOD PRESSURE: 138 MMHG

## 2023-03-14 DIAGNOSIS — Z34.83 PRENATAL CARE, SUBSEQUENT PREGNANCY, THIRD TRIMESTER: Primary | ICD-10-CM

## 2023-03-14 LAB
GLUCOSE UR STRIP-MCNC: NEGATIVE MG/DL
PROT UR STRIP-MCNC: NEGATIVE MG/DL

## 2023-03-14 PROCEDURE — 0502F SUBSEQUENT PRENATAL CARE: CPT | Performed by: OBSTETRICS & GYNECOLOGY

## 2023-03-14 NOTE — PROGRESS NOTES
Good fetal movement  Has had some contractions, declines exam  Reviewed GBS positive  Labor instructions    Diagnoses and all orders for this visit:    1. Prenatal care, subsequent pregnancy, third trimester (Primary)

## 2023-03-17 ENCOUNTER — HOSPITAL ENCOUNTER (OUTPATIENT)
Facility: HOSPITAL | Age: 33
Discharge: HOME | End: 2023-03-17
Attending: OBSTETRICS & GYNECOLOGY | Admitting: OBSTETRICS & GYNECOLOGY
Payer: COMMERCIAL

## 2023-03-17 VITALS
TEMPERATURE: 98 F | WEIGHT: 196.2 LBS | HEART RATE: 93 BPM | HEIGHT: 67 IN | RESPIRATION RATE: 18 BRPM | SYSTOLIC BLOOD PRESSURE: 125 MMHG | DIASTOLIC BLOOD PRESSURE: 88 MMHG | BODY MASS INDEX: 30.79 KG/M2

## 2023-03-17 PROCEDURE — G0378 HOSPITAL OBSERVATION PER HR: HCPCS

## 2023-03-17 PROCEDURE — G0463 HOSPITAL OUTPT CLINIC VISIT: HCPCS

## 2023-03-17 NOTE — NURSING NOTE
Education provided to patient on reasons to return to labor and delivery including increased frequency and intensity of contractions, sudden gush/leaking of fluid, vaginal bleeding, and decreased fetal movement.

## 2023-03-20 ENCOUNTER — ANESTHESIA EVENT (OUTPATIENT)
Dept: LABOR AND DELIVERY | Facility: HOSPITAL | Age: 33
End: 2023-03-20
Payer: COMMERCIAL

## 2023-03-20 ENCOUNTER — ANESTHESIA (OUTPATIENT)
Dept: LABOR AND DELIVERY | Facility: HOSPITAL | Age: 33
End: 2023-03-20
Payer: COMMERCIAL

## 2023-03-20 ENCOUNTER — HOSPITAL ENCOUNTER (INPATIENT)
Facility: HOSPITAL | Age: 33
LOS: 2 days | Discharge: HOME | End: 2023-03-22
Attending: OBSTETRICS & GYNECOLOGY | Admitting: OBSTETRICS & GYNECOLOGY
Payer: COMMERCIAL

## 2023-03-20 DIAGNOSIS — R52 PAIN RELATED TO VAGINAL DELIVERY: Primary | ICD-10-CM

## 2023-03-20 PROBLEM — Z34.90 PREGNANCY: Status: ACTIVE | Noted: 2023-03-20

## 2023-03-20 LAB
ABO GROUP BLD: NORMAL
BLD GP AB SCN SERPL QL: NEGATIVE
DEPRECATED RDW RBC AUTO: 43.7 FL (ref 37–54)
ERYTHROCYTE [DISTWIDTH] IN BLOOD BY AUTOMATED COUNT: 13.3 % (ref 12.3–15.4)
HCT VFR BLD AUTO: 31.5 % (ref 34–46.6)
HGB BLD-MCNC: 9.9 G/DL (ref 12–15.9)
MCH RBC QN AUTO: 28.4 PG (ref 26.6–33)
MCHC RBC AUTO-ENTMCNC: 31.4 G/DL (ref 31.5–35.7)
MCV RBC AUTO: 90.3 FL (ref 79–97)
PLATELET # BLD AUTO: 116 10*3/MM3 (ref 140–450)
PMV BLD AUTO: 12 FL (ref 6–12)
RBC # BLD AUTO: 3.49 10*6/MM3 (ref 3.77–5.28)
RH BLD: POSITIVE
T&S EXPIRATION DATE: NORMAL
WBC NRBC COR # BLD: 6.84 10*3/MM3 (ref 3.4–10.8)

## 2023-03-20 PROCEDURE — 85027 COMPLETE CBC AUTOMATED: CPT | Performed by: OBSTETRICS & GYNECOLOGY

## 2023-03-20 PROCEDURE — 51702 INSERT TEMP BLADDER CATH: CPT

## 2023-03-20 PROCEDURE — 86900 BLOOD TYPING SEROLOGIC ABO: CPT | Performed by: OBSTETRICS & GYNECOLOGY

## 2023-03-20 PROCEDURE — 59400 OBSTETRICAL CARE: CPT | Performed by: OBSTETRICS & GYNECOLOGY

## 2023-03-20 PROCEDURE — 25010000002 ONDANSETRON PER 1 MG: Performed by: OBSTETRICS & GYNECOLOGY

## 2023-03-20 PROCEDURE — 86901 BLOOD TYPING SEROLOGIC RH(D): CPT | Performed by: OBSTETRICS & GYNECOLOGY

## 2023-03-20 PROCEDURE — 25010000002 ROPIVACAINE PER 1 MG

## 2023-03-20 PROCEDURE — 0KQM0ZZ REPAIR PERINEUM MUSCLE, OPEN APPROACH: ICD-10-PCS | Performed by: OBSTETRICS & GYNECOLOGY

## 2023-03-20 PROCEDURE — 86850 RBC ANTIBODY SCREEN: CPT | Performed by: OBSTETRICS & GYNECOLOGY

## 2023-03-20 PROCEDURE — 88307 TISSUE EXAM BY PATHOLOGIST: CPT | Performed by: OBSTETRICS & GYNECOLOGY

## 2023-03-20 PROCEDURE — 25010000002 PENICILLIN G POTASSIUM PER 600000 UNITS: Performed by: OBSTETRICS & GYNECOLOGY

## 2023-03-20 PROCEDURE — C1755 CATHETER, INTRASPINAL: HCPCS

## 2023-03-20 RX ORDER — MISOPROSTOL 200 UG/1
800 TABLET ORAL ONCE AS NEEDED
Status: DISCONTINUED | OUTPATIENT
Start: 2023-03-20 | End: 2023-03-20 | Stop reason: HOSPADM

## 2023-03-20 RX ORDER — LIDOCAINE HYDROCHLORIDE AND EPINEPHRINE 15; 5 MG/ML; UG/ML
INJECTION, SOLUTION EPIDURAL
Status: COMPLETED | OUTPATIENT
Start: 2023-03-20 | End: 2023-03-20

## 2023-03-20 RX ORDER — CALCIUM CARBONATE 200(500)MG
2 TABLET,CHEWABLE ORAL 3 TIMES DAILY PRN
OUTPATIENT
Start: 2023-03-20

## 2023-03-20 RX ORDER — ONDANSETRON 4 MG/1
4 TABLET, FILM COATED ORAL EVERY 6 HOURS PRN
Status: DISCONTINUED | OUTPATIENT
Start: 2023-03-20 | End: 2023-03-20 | Stop reason: HOSPADM

## 2023-03-20 RX ORDER — LIDOCAINE HYDROCHLORIDE 10 MG/ML
INJECTION, SOLUTION EPIDURAL; INFILTRATION; INTRACAUDAL; PERINEURAL AS NEEDED
Status: DISCONTINUED | OUTPATIENT
Start: 2023-03-20 | End: 2023-03-20 | Stop reason: SURG

## 2023-03-20 RX ORDER — CARBOPROST TROMETHAMINE 250 UG/ML
250 INJECTION, SOLUTION INTRAMUSCULAR
Status: DISCONTINUED | OUTPATIENT
Start: 2023-03-20 | End: 2023-03-20 | Stop reason: HOSPADM

## 2023-03-20 RX ORDER — OXYTOCIN/0.9 % SODIUM CHLORIDE 30/500 ML
250 PLASTIC BAG, INJECTION (ML) INTRAVENOUS ONCE AS NEEDED
Status: DISCONTINUED | OUTPATIENT
Start: 2023-03-20 | End: 2023-03-20 | Stop reason: HOSPADM

## 2023-03-20 RX ORDER — BISACODYL 10 MG
10 SUPPOSITORY, RECTAL RECTAL DAILY PRN
Status: DISCONTINUED | OUTPATIENT
Start: 2023-03-21 | End: 2023-03-22 | Stop reason: HOSPADM

## 2023-03-20 RX ORDER — DOCUSATE SODIUM 100 MG/1
100 CAPSULE, LIQUID FILLED ORAL 2 TIMES DAILY
Status: DISCONTINUED | OUTPATIENT
Start: 2023-03-20 | End: 2023-03-22 | Stop reason: HOSPADM

## 2023-03-20 RX ORDER — SODIUM CHLORIDE 0.9 % (FLUSH) 0.9 %
1-10 SYRINGE (ML) INJECTION AS NEEDED
Status: DISCONTINUED | OUTPATIENT
Start: 2023-03-20 | End: 2023-03-22 | Stop reason: HOSPADM

## 2023-03-20 RX ORDER — SODIUM CHLORIDE, SODIUM LACTATE, POTASSIUM CHLORIDE, CALCIUM CHLORIDE 600; 310; 30; 20 MG/100ML; MG/100ML; MG/100ML; MG/100ML
125 INJECTION, SOLUTION INTRAVENOUS CONTINUOUS
Status: DISCONTINUED | OUTPATIENT
Start: 2023-03-20 | End: 2023-03-22 | Stop reason: HOSPADM

## 2023-03-20 RX ORDER — TRISODIUM CITRATE DIHYDRATE AND CITRIC ACID MONOHYDRATE 500; 334 MG/5ML; MG/5ML
30 SOLUTION ORAL ONCE AS NEEDED
Status: DISCONTINUED | OUTPATIENT
Start: 2023-03-20 | End: 2023-03-20 | Stop reason: HOSPADM

## 2023-03-20 RX ORDER — ACETAMINOPHEN 325 MG/1
650 TABLET ORAL EVERY 4 HOURS PRN
OUTPATIENT
Start: 2023-03-20

## 2023-03-20 RX ORDER — ONDANSETRON 4 MG/1
4 TABLET, FILM COATED ORAL EVERY 6 HOURS PRN
OUTPATIENT
Start: 2023-03-20

## 2023-03-20 RX ORDER — ACETAMINOPHEN 325 MG/1
650 TABLET ORAL EVERY 6 HOURS PRN
Status: DISCONTINUED | OUTPATIENT
Start: 2023-03-20 | End: 2023-03-22 | Stop reason: HOSPADM

## 2023-03-20 RX ORDER — SODIUM CHLORIDE 0.9 % (FLUSH) 0.9 %
10 SYRINGE (ML) INJECTION AS NEEDED
Status: DISCONTINUED | OUTPATIENT
Start: 2023-03-20 | End: 2023-03-20 | Stop reason: HOSPADM

## 2023-03-20 RX ORDER — HYDROCODONE BITARTRATE AND ACETAMINOPHEN 5; 325 MG/1; MG/1
1 TABLET ORAL EVERY 4 HOURS PRN
Status: DISCONTINUED | OUTPATIENT
Start: 2023-03-20 | End: 2023-03-22 | Stop reason: HOSPADM

## 2023-03-20 RX ORDER — ONDANSETRON 4 MG/1
4 TABLET, FILM COATED ORAL EVERY 6 HOURS PRN
Status: DISCONTINUED | OUTPATIENT
Start: 2023-03-20 | End: 2023-03-22 | Stop reason: HOSPADM

## 2023-03-20 RX ORDER — PENICILLIN G 3000000 [IU]/50ML
3 INJECTION, SOLUTION INTRAVENOUS EVERY 4 HOURS
Status: DISCONTINUED | OUTPATIENT
Start: 2023-03-20 | End: 2023-03-20 | Stop reason: HOSPADM

## 2023-03-20 RX ORDER — PROMETHAZINE HYDROCHLORIDE 12.5 MG/1
12.5 SUPPOSITORY RECTAL EVERY 6 HOURS PRN
Status: DISCONTINUED | OUTPATIENT
Start: 2023-03-20 | End: 2023-03-22 | Stop reason: HOSPADM

## 2023-03-20 RX ORDER — ONDANSETRON 2 MG/ML
4 INJECTION INTRAMUSCULAR; INTRAVENOUS EVERY 6 HOURS PRN
Status: DISCONTINUED | OUTPATIENT
Start: 2023-03-20 | End: 2023-03-22 | Stop reason: HOSPADM

## 2023-03-20 RX ORDER — HYDROCORTISONE 25 MG/G
1 CREAM TOPICAL AS NEEDED
Status: DISCONTINUED | OUTPATIENT
Start: 2023-03-20 | End: 2023-03-22 | Stop reason: HOSPADM

## 2023-03-20 RX ORDER — METHYLERGONOVINE MALEATE 0.2 MG/ML
200 INJECTION INTRAVENOUS ONCE AS NEEDED
Status: DISCONTINUED | OUTPATIENT
Start: 2023-03-20 | End: 2023-03-20 | Stop reason: HOSPADM

## 2023-03-20 RX ORDER — TRISODIUM CITRATE DIHYDRATE AND CITRIC ACID MONOHYDRATE 500; 334 MG/5ML; MG/5ML
30 SOLUTION ORAL ONCE
Status: DISCONTINUED | OUTPATIENT
Start: 2023-03-20 | End: 2023-03-20 | Stop reason: HOSPADM

## 2023-03-20 RX ORDER — ONDANSETRON 2 MG/ML
4 INJECTION INTRAMUSCULAR; INTRAVENOUS EVERY 6 HOURS PRN
OUTPATIENT
Start: 2023-03-20

## 2023-03-20 RX ORDER — LIDOCAINE HYDROCHLORIDE 20 MG/ML
20 INJECTION, SOLUTION INFILTRATION; PERINEURAL ONCE
Status: DISCONTINUED | OUTPATIENT
Start: 2023-03-20 | End: 2023-03-22 | Stop reason: HOSPADM

## 2023-03-20 RX ORDER — HYDROCODONE BITARTRATE AND ACETAMINOPHEN 10; 325 MG/1; MG/1
1 TABLET ORAL EVERY 4 HOURS PRN
OUTPATIENT
Start: 2023-03-20 | End: 2023-03-27

## 2023-03-20 RX ORDER — SODIUM CHLORIDE 0.9 % (FLUSH) 0.9 %
10 SYRINGE (ML) INJECTION EVERY 12 HOURS SCHEDULED
Status: DISCONTINUED | OUTPATIENT
Start: 2023-03-20 | End: 2023-03-20 | Stop reason: HOSPADM

## 2023-03-20 RX ORDER — LIDOCAINE HYDROCHLORIDE 20 MG/ML
20 INJECTION, SOLUTION INFILTRATION; PERINEURAL AS NEEDED
Status: DISCONTINUED | OUTPATIENT
Start: 2023-03-20 | End: 2023-03-20 | Stop reason: HOSPADM

## 2023-03-20 RX ORDER — IBUPROFEN 600 MG/1
600 TABLET ORAL EVERY 6 HOURS PRN
Status: DISCONTINUED | OUTPATIENT
Start: 2023-03-20 | End: 2023-03-20 | Stop reason: HOSPADM

## 2023-03-20 RX ORDER — LIDOCAINE HYDROCHLORIDE 10 MG/ML
5 INJECTION, SOLUTION EPIDURAL; INFILTRATION; INTRACAUDAL; PERINEURAL AS NEEDED
Status: DISCONTINUED | OUTPATIENT
Start: 2023-03-20 | End: 2023-03-20 | Stop reason: HOSPADM

## 2023-03-20 RX ORDER — HYDROXYZINE HYDROCHLORIDE 25 MG/1
50 TABLET, FILM COATED ORAL NIGHTLY PRN
Status: DISCONTINUED | OUTPATIENT
Start: 2023-03-20 | End: 2023-03-22 | Stop reason: HOSPADM

## 2023-03-20 RX ORDER — IBUPROFEN 600 MG/1
600 TABLET ORAL EVERY 6 HOURS PRN
Status: DISCONTINUED | OUTPATIENT
Start: 2023-03-20 | End: 2023-03-22 | Stop reason: HOSPADM

## 2023-03-20 RX ORDER — HYDROCODONE BITARTRATE AND ACETAMINOPHEN 10; 325 MG/1; MG/1
1 TABLET ORAL EVERY 4 HOURS PRN
Status: DISCONTINUED | OUTPATIENT
Start: 2023-03-20 | End: 2023-03-22 | Stop reason: HOSPADM

## 2023-03-20 RX ORDER — EPHEDRINE SULFATE 50 MG/ML
10 INJECTION, SOLUTION INTRAVENOUS
Status: DISCONTINUED | OUTPATIENT
Start: 2023-03-20 | End: 2023-03-20 | Stop reason: HOSPADM

## 2023-03-20 RX ORDER — CALCIUM CARBONATE 200(500)MG
2 TABLET,CHEWABLE ORAL 3 TIMES DAILY PRN
Status: DISCONTINUED | OUTPATIENT
Start: 2023-03-20 | End: 2023-03-22 | Stop reason: HOSPADM

## 2023-03-20 RX ORDER — PROMETHAZINE HYDROCHLORIDE 25 MG/1
12.5 TABLET ORAL EVERY 6 HOURS PRN
OUTPATIENT
Start: 2023-03-20

## 2023-03-20 RX ORDER — ROPIVACAINE HYDROCHLORIDE 2 MG/ML
INJECTION, SOLUTION EPIDURAL; INFILTRATION; PERINEURAL AS NEEDED
Status: DISCONTINUED | OUTPATIENT
Start: 2023-03-20 | End: 2023-03-20 | Stop reason: SURG

## 2023-03-20 RX ORDER — OXYTOCIN/0.9 % SODIUM CHLORIDE 30/500 ML
999 PLASTIC BAG, INJECTION (ML) INTRAVENOUS ONCE
Status: COMPLETED | OUTPATIENT
Start: 2023-03-20 | End: 2023-03-20

## 2023-03-20 RX ORDER — ONDANSETRON 2 MG/ML
4 INJECTION INTRAMUSCULAR; INTRAVENOUS EVERY 6 HOURS PRN
Status: DISCONTINUED | OUTPATIENT
Start: 2023-03-20 | End: 2023-03-20 | Stop reason: HOSPADM

## 2023-03-20 RX ORDER — FAMOTIDINE 10 MG/ML
20 INJECTION, SOLUTION INTRAVENOUS ONCE AS NEEDED
Status: DISCONTINUED | OUTPATIENT
Start: 2023-03-20 | End: 2023-03-22 | Stop reason: HOSPADM

## 2023-03-20 RX ORDER — PROMETHAZINE HYDROCHLORIDE 25 MG/1
25 TABLET ORAL EVERY 6 HOURS PRN
Status: DISCONTINUED | OUTPATIENT
Start: 2023-03-20 | End: 2023-03-22 | Stop reason: HOSPADM

## 2023-03-20 RX ORDER — ACETAMINOPHEN 325 MG/1
650 TABLET ORAL EVERY 4 HOURS PRN
Status: DISCONTINUED | OUTPATIENT
Start: 2023-03-20 | End: 2023-03-20 | Stop reason: HOSPADM

## 2023-03-20 RX ORDER — TERBUTALINE SULFATE 1 MG/ML
0.25 INJECTION, SOLUTION SUBCUTANEOUS AS NEEDED
Status: DISCONTINUED | OUTPATIENT
Start: 2023-03-20 | End: 2023-03-20 | Stop reason: HOSPADM

## 2023-03-20 RX ORDER — BUTORPHANOL TARTRATE 1 MG/ML
1 INJECTION, SOLUTION INTRAMUSCULAR; INTRAVENOUS
Status: DISCONTINUED | OUTPATIENT
Start: 2023-03-20 | End: 2023-03-20 | Stop reason: HOSPADM

## 2023-03-20 RX ORDER — PROMETHAZINE HYDROCHLORIDE 12.5 MG/1
12.5 SUPPOSITORY RECTAL EVERY 6 HOURS PRN
OUTPATIENT
Start: 2023-03-20

## 2023-03-20 RX ORDER — PRENATAL VIT/IRON FUM/FOLIC AC 27MG-0.8MG
1 TABLET ORAL DAILY
Status: DISCONTINUED | OUTPATIENT
Start: 2023-03-20 | End: 2023-03-22 | Stop reason: HOSPADM

## 2023-03-20 RX ADMIN — HYDROCORTISONE 1 APPLICATION: 25 CREAM TOPICAL at 06:23

## 2023-03-20 RX ADMIN — PRAMOXINE HYDROCHLORIDE AND HYDROCORTISONE ACETATE: 100; 100 AEROSOL, FOAM TOPICAL at 06:23

## 2023-03-20 RX ADMIN — ONDANSETRON 4 MG: 2 INJECTION INTRAMUSCULAR; INTRAVENOUS at 01:41

## 2023-03-20 RX ADMIN — IBUPROFEN 600 MG: 600 TABLET, FILM COATED ORAL at 20:15

## 2023-03-20 RX ADMIN — SODIUM CHLORIDE, POTASSIUM CHLORIDE, SODIUM LACTATE AND CALCIUM CHLORIDE 125 ML/HR: 600; 310; 30; 20 INJECTION, SOLUTION INTRAVENOUS at 01:14

## 2023-03-20 RX ADMIN — ACETAMINOPHEN 650 MG: 325 TABLET ORAL at 16:24

## 2023-03-20 RX ADMIN — ACETAMINOPHEN 650 MG: 325 TABLET ORAL at 10:18

## 2023-03-20 RX ADMIN — HYDROCODONE BITARTRATE AND ACETAMINOPHEN 1 TABLET: 5; 325 TABLET ORAL at 06:23

## 2023-03-20 RX ADMIN — ROPIVACAINE HYDROCHLORIDE 6 ML/HR: 2 INJECTION, SOLUTION EPIDURAL; INFILTRATION at 02:08

## 2023-03-20 RX ADMIN — IBUPROFEN 600 MG: 600 TABLET, FILM COATED ORAL at 06:23

## 2023-03-20 RX ADMIN — HYDROCODONE BITARTRATE AND ACETAMINOPHEN 1 TABLET: 10; 325 TABLET ORAL at 20:15

## 2023-03-20 RX ADMIN — DOCUSATE SODIUM 100 MG: 100 CAPSULE ORAL at 20:15

## 2023-03-20 RX ADMIN — LIDOCAINE HYDROCHLORIDE 30 MG: 10 INJECTION, SOLUTION EPIDURAL; INFILTRATION; INTRACAUDAL; PERINEURAL at 01:47

## 2023-03-20 RX ADMIN — IBUPROFEN 600 MG: 600 TABLET, FILM COATED ORAL at 13:19

## 2023-03-20 RX ADMIN — SODIUM CHLORIDE 5 MILLION UNITS: 9 INJECTION, SOLUTION INTRAVENOUS at 02:04

## 2023-03-20 RX ADMIN — PRENATAL VIT W/ FE FUMARATE-FA TAB 27-0.8 MG 1 TABLET: 27-0.8 TAB at 08:30

## 2023-03-20 RX ADMIN — SODIUM CHLORIDE, POTASSIUM CHLORIDE, SODIUM LACTATE AND CALCIUM CHLORIDE 1000 ML: 600; 310; 30; 20 INJECTION, SOLUTION INTRAVENOUS at 01:14

## 2023-03-20 RX ADMIN — Medication 10 ML: at 20:17

## 2023-03-20 RX ADMIN — ROPIVACAINE HYDROCHLORIDE 6 ML: 2 INJECTION, SOLUTION EPIDURAL; INFILTRATION at 02:02

## 2023-03-20 RX ADMIN — Medication: at 06:12

## 2023-03-20 RX ADMIN — DOCUSATE SODIUM 100 MG: 100 CAPSULE ORAL at 08:30

## 2023-03-20 RX ADMIN — LIDOCAINE HYDROCHLORIDE AND EPINEPHRINE 3 ML: 15; 5 INJECTION, SOLUTION EPIDURAL at 01:58

## 2023-03-20 RX ADMIN — HYDROCODONE BITARTRATE AND ACETAMINOPHEN 1 TABLET: 5; 325 TABLET ORAL at 13:19

## 2023-03-20 RX ADMIN — LIDOCAINE HYDROCHLORIDE 5 ML: 10 INJECTION, SOLUTION EPIDURAL; INFILTRATION; INTRACAUDAL; PERINEURAL at 02:44

## 2023-03-20 RX ADMIN — OXYTOCIN-SODIUM CHLORIDE 0.9% IV SOLN 30 UNIT/500ML 999 ML/HR: 30-0.9/5 SOLUTION at 02:40

## 2023-03-20 NOTE — PLAN OF CARE
Patient VSS. FFMUU. Voiding spontaneously. Perineal and hemorrhoid pain controlled with comfort products, OTC and Norco. Bonding with infant.   Problem: Pain Acute  Goal: Acceptable Pain Control and Functional Ability  Outcome: Ongoing, Progressing   Goal Outcome Evaluation:

## 2023-03-20 NOTE — LACTATION NOTE
.Mother's Name: Corrine Phone #:  Infant Name:  Mulu   :3/20/2023  Gestation:40w3d  Day of life:0  Birth weight:  8-12 (3970g) Discharge weight:  Weight Loss:   24 hour Summary of Feeds:  Voids:  Stools:  Assistive devices (shields, shells, etc):NA  Significant Maternal history:L2, HPV,  first child for 1 year  Maternal Concerns:  denies  Maternal Goal: exclusive bf for 1year  Mother's Medications: PNV  Breastpump for home: YES. Alicia and tacho   Ped follow up appt:    Visit with mother in room to discuss breastfeeding plans, progress. Mother reports breastfeeding going well, infant's last feeding was short but infant fussy and calmed at breast. Infant currently beginning to fuss, sucking on pacifier. Acknowledged behavior as hunger cues and encourage feeding on demand. Initial breastfeeding packet given and reviewed. Breastfeeding book provided. Encouraged skin to skin bonding and feedings, hand expression, on demand feedings. Questions denied at this time. Encouragement and support provided.     Instructed mom our lactation team is here for continued support throughout their breastfeeding journey. Our team has encouraged mom to call with any questions or concerns that may arise after discharge.

## 2023-03-20 NOTE — ANESTHESIA PROCEDURE NOTES
Labor Epidural      Patient reassessed immediately prior to procedure    Patient location during procedure: OB  Performed By  CRNA/CAA: Armando Hoskins CRNA  Preanesthetic Checklist  Completed: patient identified, risks and benefits discussed, monitors and equipment checked, pre-op evaluation and timeout performed  Prep:  Pt Position:sitting  Sterile Tech:sterile barrier, mask, gloves and cap  Prep:chlorhexidine gluconate and isopropyl alcohol  Monitoring:blood pressure monitoring and continuous pulse oximetry  Epidural Block Procedure:  Approach:midline  Guidance:palpation technique  Location:L4-L5  Needle Type:Tuohy  Needle Gauge:18 G  Loss of Resistance Medium: saline  Loss of resistance: 6.5.  Cath Depth at skin:12 cm  Paresthesia: none  Aspiration:negative  Test Dose:negative  Medication: lidocaine 1.5%-EPINEPHrine 1:200,000 (XYLOCAINE W/EPI) injection - Injection   3 mL - 3/20/2023 1:58:00 AM  Number of Attempts: 1  Post Assessment:  Dressing:occlusive dressing applied and secured with tape  Pt Tolerance:patient tolerated the procedure well with no apparent complications  Complications:no

## 2023-03-20 NOTE — ANESTHESIA PREPROCEDURE EVALUATION
Anesthesia Evaluation     history of anesthetic complications: PONV               Airway   Mallampati: II  TM distance: >3 FB  No difficulty expected  Dental - normal exam     Pulmonary - negative pulmonary ROS   Cardiovascular - negative cardio ROS  Exercise tolerance: good (4-7 METS)        Neuro/Psych- negative ROS  GI/Hepatic/Renal/Endo      Musculoskeletal (-) negative ROS    Abdominal    Substance History      OB/GYN    (+) Pregnant,         Other        ROS/Med Hx Other: Labs reviewed. Risks, benefits, alternatives discussed with patient. Patient ready to proceed.                Anesthesia Plan    ASA 2     epidural       Anesthetic plan, risks, benefits, and alternatives have been provided, discussed and informed consent has been obtained with: patient.        CODE STATUS:

## 2023-03-20 NOTE — L&D DELIVERY NOTE
[facility]  Vaginal Delivery Note   Review the Delivery Report for details.       Delivery     Delivery: Vaginal, Spontaneous     YOB: 2024    Time of Birth:  Gestational Age 2:30 AM   40w3d     Anesthesia: Epidural     Delivering clinician: Kimber Feng    Forceps?   No   Vacuum? No    Shoulder dystocia present: No        Delivery narrative:  Progressed to C/C/+2 and pushed well to deliver a LVIF. KAYLIE to LOT vigorous to mom's abdomen. Placenta spontaneous and intact with 3VC after IV Pitocin. 2nd degree laceration repaired with 2-0 Chromic. Epidural and lidocaine anes. EBL 200mL. Moving all extremities. No complications. Sponge and needle count correct.        Infant    Findings: female  infant     Infant observations: Weight: 3970 g (8 lb 12 oz)   Length: 20.5  in  Observations/Comments:        Apgars: 7  @ 1 minute /    9  @ 5 minutes   Infant Name: Opal     Placenta, Cord, and Fluid    Placenta delivered  Spontaneous  at   3/20/2023  2:35 AM     Cord: 3 vessels  present.   Nuchal Cord?  no   Cord blood obtained: No    Cord gases obtained:  Yes    Cord gas results: Venous:  No results found for: PHCVEN    Arterial:  No results found for: PHCART     Repair    Episiotomy: None     No    Lacerations: Yes  Laceration Information  Laceration Repaired?   Perineal: 2nd      Periurethral:       Labial:       Sulcus:       Vaginal:       Cervical:         Suture used for repair: 2-0 chromic gut   Estimated Blood Loss:               Quantitative Blood Loss:    QBL from VAG DEL: 176 (03/20/23 0255)             Complications  none    Disposition  Mother to Mother Baby/Postpartum  in stable condition currently.  Baby to NBN  in stable condition currently.      Kimber Feng DO  03/20/23  02:56 CDT

## 2023-03-20 NOTE — H&P
[facility]  Corrine Conte  : 1990  MRN: [id number]  CSN: [id number]    History and Physical    Subjective   Corrine Conte is a 32 y.o. year old  with an Estimated Date of Delivery: 3/17/23 currently at 40w3d presenting with leaking fluid since 11 pm.     Prenatal care has been with Dr. Cristiano Flores.  It has been benign.    OB History    Para Term  AB Living   4 2 1 1 1 1   SAB IAB Ectopic Molar Multiple Live Births   1 0 0 0 1 3      # Outcome Date GA Lbr Adam/2nd Weight Sex Delivery Anes PTL Lv   4 Current            3 Term 21 40w3d / 00:45 4060 g (8 lb 15.2 oz) F Vag-Spont EPI N JULIANNA      Birth Comments: 35cm HC      Name: HEDY CONTE      Apgar1: 7  Apgar5: 9   2 SAB 2020           1A  19 23w1d 02:17 :16 480 g (1 lb 0.9 oz) F Vag-Spont Spinal, EPI Y ND      Birth Comments: H.C. 21.5cm, AGA (19.56%)      Complications: Chorioamnionitis, Prolonged premature rupture of membranes      Name: HEDY CONTE      Apgar1: 1  Apgar5: 1   1B  19 23w1d 02:17 / 01:19 300 g (10.6 oz) F Vag-Breech Spinal, EPI Y ND      Birth Comments: H.C. 19cm, SGA (0.01%)      Complications: Chorioamnionitis, Prolonged premature rupture of membranes      Name: HEDY CONTE B      Apgar1: 3  Apgar5: 1     Past Medical History:   Diagnosis Date   • Abnormal Pap smear of cervix    • Family history of polyps in the colon    • HPV (human papilloma virus) infection    • Multiple gestation 19   • PONV (postoperative nausea and vomiting)      Past Surgical History:   Procedure Laterality Date   • ADENOIDECTOMY     • BREAST AUGMENTATION     • CERVICAL BIOPSY  W/ LOOP ELECTRODE EXCISION     • COLONOSCOPY N/A 2017    Procedure: COLONOSCOPY WITH ANESTHESIA;  Surgeon: Tony Farias DO;  Location: [facility] ENDOSCOPY;  Service:    • LEEP     • TONSILLECTOMY     • WISDOM TOOTH EXTRACTION       No current facility-administered medications  "for this encounter.    Allergies   Allergen Reactions   • Diphenhydramine Rash   • Sulfa Antibiotics Rash     Social History    Tobacco Use      Smoking status: Never      Smokeless tobacco: Never    Review of Systems   Genitourinary: Positive for vaginal discharge. Negative for pelvic pain and vaginal bleeding.         Objective   Ht 170.2 cm (67\")   Wt 89.5 kg (197 lb 6.4 oz)   LMP 06/09/2022   BMI 30.92 kg/m²   General: well developed; well nourished  no acute distress   Heart: Not performed.   Lungs: breathing is unlabored   Abdomen: soft, non-tender; no masses  no umbilical or inguinal hernias are present  no hepato-splenomegaly   FHT's: reactive, reassuring   Cervix: was checked (by RN): 2-3 cm / 60 % / -3  EFW 8.5 pounds  Pelvis seems clinically adequate   Presentation: cephalic   Contractions: regular every 2-3 minutes - external monitors used     Prenatal Labs  Lab Results   Component Value Date    HGB 11.6 (L) 12/13/2022    HEPBSAG Negative 08/18/2022    ABORH A POS 04/30/2019    ABO A 08/18/2022    RH Positive 08/18/2022    ABSCRN Negative 08/18/2022    [id number] Non Reactive 08/18/2022    HEPCVIRUSABY <0.1 08/18/2022    URINECX Final report 08/12/2022       Current Labs Reviewed   No data reviewed       Assessment   1. IUP at 40w3d  2. Group B strep status: positive  3. SROM      Plan   1. Admit to labor and delivery    2. Monitor contractions and if patient spaces out starting pitocin     Kimber Feng DO  3/20/2023  00:36 CDT       "

## 2023-03-21 LAB
ANISOCYTOSIS BLD QL: ABNORMAL
DEPRECATED RDW RBC AUTO: 44.7 FL (ref 37–54)
EOSINOPHIL # BLD MANUAL: 0.14 10*3/MM3 (ref 0–0.4)
EOSINOPHIL NFR BLD MANUAL: 2 % (ref 0.3–6.2)
ERYTHROCYTE [DISTWIDTH] IN BLOOD BY AUTOMATED COUNT: 13.5 % (ref 12.3–15.4)
HCT VFR BLD AUTO: 25.4 % (ref 34–46.6)
HGB BLD-MCNC: 7.8 G/DL (ref 12–15.9)
LYMPHOCYTES # BLD MANUAL: 0.72 10*3/MM3 (ref 0.7–3.1)
LYMPHOCYTES NFR BLD MANUAL: 3.1 % (ref 5–12)
MCH RBC QN AUTO: 28.3 PG (ref 26.6–33)
MCHC RBC AUTO-ENTMCNC: 30.7 G/DL (ref 31.5–35.7)
MCV RBC AUTO: 92 FL (ref 79–97)
MONOCYTES # BLD: 0.22 10*3/MM3 (ref 0.1–0.9)
NEUTROPHILS # BLD AUTO: 5.99 10*3/MM3 (ref 1.7–7)
NEUTROPHILS NFR BLD MANUAL: 81.6 % (ref 42.7–76)
NEUTS BAND NFR BLD MANUAL: 3.1 % (ref 0–5)
PLATELET # BLD AUTO: 91 10*3/MM3 (ref 140–450)
PMV BLD AUTO: 11.6 FL (ref 6–12)
POLYCHROMASIA BLD QL SMEAR: ABNORMAL
RBC # BLD AUTO: 2.76 10*6/MM3 (ref 3.77–5.28)
SMALL PLATELETS BLD QL SMEAR: ABNORMAL
VARIANT LYMPHS NFR BLD MANUAL: 10.2 % (ref 19.6–45.3)
WBC MORPH BLD: NORMAL
WBC NRBC COR # BLD: 7.07 10*3/MM3 (ref 3.4–10.8)

## 2023-03-21 PROCEDURE — 85007 BL SMEAR W/DIFF WBC COUNT: CPT | Performed by: OBSTETRICS & GYNECOLOGY

## 2023-03-21 PROCEDURE — 85025 COMPLETE CBC W/AUTO DIFF WBC: CPT | Performed by: OBSTETRICS & GYNECOLOGY

## 2023-03-21 RX ADMIN — HYDROCODONE BITARTRATE AND ACETAMINOPHEN 1 TABLET: 5; 325 TABLET ORAL at 14:46

## 2023-03-21 RX ADMIN — HYDROCODONE BITARTRATE AND ACETAMINOPHEN 1 TABLET: 5; 325 TABLET ORAL at 08:51

## 2023-03-21 RX ADMIN — HYDROCODONE BITARTRATE AND ACETAMINOPHEN 1 TABLET: 10; 325 TABLET ORAL at 03:26

## 2023-03-21 RX ADMIN — IBUPROFEN 600 MG: 600 TABLET, FILM COATED ORAL at 03:26

## 2023-03-21 RX ADMIN — HYDROCODONE BITARTRATE AND ACETAMINOPHEN 1 TABLET: 5; 325 TABLET ORAL at 21:10

## 2023-03-21 RX ADMIN — PRAMOXINE HYDROCHLORIDE AND HYDROCORTISONE ACETATE: 100; 100 AEROSOL, FOAM TOPICAL at 11:36

## 2023-03-21 RX ADMIN — DOCUSATE SODIUM 100 MG: 100 CAPSULE ORAL at 08:52

## 2023-03-21 RX ADMIN — DOCUSATE SODIUM 100 MG: 100 CAPSULE ORAL at 21:10

## 2023-03-21 RX ADMIN — PRENATAL VIT W/ FE FUMARATE-FA TAB 27-0.8 MG 1 TABLET: 27-0.8 TAB at 08:52

## 2023-03-21 NOTE — LACTATION NOTE
.Mother's Name: Corrine Phone #:  Infant Name:  Mulu   :3/20/2024  Gestation:40w3d  Day of life:0  Birth weight:  8-12 (3970g) Discharge weight:  Weight Loss:   24 hour Summary of Feeds:  Voids:  Stools:  Assistive devices (shields, shells, etc):NA  Significant Maternal history:L2, HPV,  first child for 1 year  Maternal Concerns:  denies  Maternal Goal: exclusive bf for 1year  Mother's Medications: PNV  Breastpump for home: YES. Alicia and tacho   Ped follow up appt:    F/U with mom.  She denies any problems.  Infant latching and nursing good, no tenderness.  Offered lanolin, but mom denied needing it.  Discussed breast compression and massage to help infant intake, as well as waking and stimulating to keep wake during the feeding.  Offered assistance as needed throughout the night.    Instructed mom our lactation team is here for continued support throughout their breastfeeding journey. Our team has encouraged mom to call with any questions or concerns that may arise after discharge.

## 2023-03-21 NOTE — LACTATION NOTE
.Mother's Name: Corrine Phone #:[phone]  Infant Name:  Mulu   :3/20/2024  Gestation:40w3d  Day of life:1  Birth weight:  8-12 (3970g) Discharge weight:  Weight Loss: -5.67%  24 hour Summary of Feeds: 8BF attempt x2 Voids: 5 Stools:6  Emesis: 1  Assistive devices (shields, shells, etc):NA  Significant Maternal history:L2, HPV,  first child for 1 year  Maternal Concerns:  denies  Maternal Goal: exclusive bf for 1year  Mother's Medications: PNV  Breastpump for home: YES. Alicia and tacho   Ped follow up appt:    Attempted follow up with mother to discuss breastfeeding progress. Mother is out of room to attend discharge education class. FOB present in room, bonding with infant. FOB reports breastfeeding going well, denies any concerns. Reviewed infant's feedings, weight loss, voids and stools. Praised for signs of adequate intake. Encouraged parents to call lactation with any questions or concerns. Encouragement and support provided.     Instructed mom our lactation team is here for continued support throughout their breastfeeding journey. Our team has encouraged mom to call with any questions or concerns that may arise after discharge.

## 2023-03-22 VITALS
DIASTOLIC BLOOD PRESSURE: 85 MMHG | OXYGEN SATURATION: 97 % | WEIGHT: 197.4 LBS | SYSTOLIC BLOOD PRESSURE: 123 MMHG | RESPIRATION RATE: 16 BRPM | TEMPERATURE: 98.2 F | HEART RATE: 65 BPM | HEIGHT: 67 IN | BODY MASS INDEX: 30.98 KG/M2

## 2023-03-22 RX ORDER — HYDROCODONE BITARTRATE AND ACETAMINOPHEN 5; 325 MG/1; MG/1
1 TABLET ORAL EVERY 8 HOURS PRN
Qty: 5 TABLET | Refills: 0 | Status: SHIPPED | OUTPATIENT
Start: 2023-03-22

## 2023-03-22 RX ADMIN — PRENATAL VIT W/ FE FUMARATE-FA TAB 27-0.8 MG 1 TABLET: 27-0.8 TAB at 08:18

## 2023-03-22 RX ADMIN — DOCUSATE SODIUM 100 MG: 100 CAPSULE ORAL at 08:18

## 2023-03-22 RX ADMIN — IBUPROFEN 600 MG: 600 TABLET, FILM COATED ORAL at 06:08

## 2023-03-22 RX ADMIN — HYDROCODONE BITARTRATE AND ACETAMINOPHEN 1 TABLET: 5; 325 TABLET ORAL at 06:08

## 2023-03-22 NOTE — PROGRESS NOTES
"      NAYAN Valverde  [facility]  [address]  [place] [address]  Office [phone]  Fax [phone]    [facility]  Vaginal Delivery Progress Note    Subjective   Postpartum Day 1: Vaginal Delivery    The patient feels well.  Her pain is well controlled with Tylenol, Motrin, and Hydrocodone.   She is ambulating well.  Patient describes her bleeding as \"lightened over night\".     Breastfeeding: infant latching.    Objective     Vital Signs Range for the last 24 hours  Temperature: Temp:  [97.7 °F (36.5 °C)-98.7 °F (37.1 °C)] 97.7 °F (36.5 °C)   Temp Source: Temp src: Temporal   BP: BP: (110-118)/(69-73) 113/69   Pulse: Heart Rate:  [66-68] 68   Respirations: Resp:  [18] 18   SPO2: SpO2:  [97 %-99 %] 99 %   O2 Amount (l/min):     O2 Devices Device (Oxygen Therapy): room air   Weight:       Admit Height:  Height: 170.2 cm (67\")      Physical Exam:  General:  no acute distresss.  Abdomen: abdomen is soft without significant tenderness, masses, organomegaly or guarding. Fundus: appropriate, firm, non tender  Extremities: normal, atraumatic, no cyanosis, and trace edema.       Lab results reviewed:  Yes   Rubella:  No results found for: RUBELLAIGGIN Nurse Transcribed from prenatal record --  No components found for: EXTRUBELQUAL  Rh Status:    RH type   Date Value Ref Range Status   03/20/2023 Positive  Final     Immunizations:   Immunization History   Administered Date(s) Administered   • COVID-19 (PFIZER) PURPLE CAP 08/27/2021, 09/17/2021   • Flu Vaccine Split Quad 12/29/2017, 01/03/2020   • FluLaval/Fluzone >6mos 12/29/2017, 01/03/2020, 10/01/2020, 01/05/2022, 10/18/2022   • Fluzone Quad >6mos (Multi-dose) 12/04/2018, 10/13/2020   • Tdap 02/25/2021, 01/03/2023     Lab Results (last 24 hours)     Procedure Component Value Units Date/Time    Manual Differential [[id number]]  (Abnormal) Collected: 03/21/23 0537    Specimen: Blood Updated: 03/21/23 0616     Neutrophil % 81.6 %      Lymphocyte % 10.2 " %      Monocyte % 3.1 %      Eosinophil % 2.0 %      Bands %  3.1 %      Neutrophils Absolute 5.99 10*3/mm3      Lymphocytes Absolute 0.72 10*3/mm3      Monocytes Absolute 0.22 10*3/mm3      Eosinophils Absolute 0.14 10*3/mm3      Anisocytosis Slight/1+     Polychromasia Slight/1+     WBC Morphology Normal     Platelet Estimate Decreased    CBC & Differential [[id number]]  (Abnormal) Collected: 03/21/23 0537    Specimen: Blood Updated: 03/21/23 0616    Narrative:      The following orders were created for panel order CBC & Differential.  Procedure                               Abnormality         Status                     ---------                               -----------         ------                     CBC Auto Differential[[id number]]        Abnormal            Final result                 Please view results for these tests on the individual orders.    CBC Auto Differential [[id number]]  (Abnormal) Collected: 03/21/23 0537    Specimen: Blood Updated: 03/21/23 0616     WBC 7.07 10*3/mm3      RBC 2.76 10*6/mm3      Hemoglobin 7.8 g/dL      Hematocrit 25.4 %      MCV 92.0 fL      MCH 28.3 pg      MCHC 30.7 g/dL      RDW 13.5 %      RDW-SD 44.7 fl      MPV 11.6 fL      Platelets 91 10*3/mm3           External Prenatal Results     Pregnancy Outside Results - Transcribed From Office Records - See Scanned Records For Details     Test Value Date Time    ABO  A  03/20/23 0110    Rh  Positive  03/20/23 0110    Antibody Screen  Negative  03/20/23 0110       Negative  08/18/22 1415    Varicella IgG       Rubella  1.17 index 08/18/22 1415    Hgb  7.8 g/dL 03/21/23 0537       9.9 g/dL 03/20/23 0110       11.6 g/dL 12/13/22 1047       12.4 g/dL 08/18/22 1415    Hct  25.4 % 03/21/23 0537       31.5 % 03/20/23 0110       36.6 % 08/18/22 1415    Glucose Fasting GTT  87 mg/dL 12/28/22 0713    Glucose Tolerance Test 1 hour  160 mg/dL 12/28/22 0713    Glucose Tolerance Test 3 hour  124 mg/dL 12/28/22 0713    Gonorrhea (discrete)   Negative  02/16/23 1422       Negative  08/12/22 1412    Chlamydia (discrete)  Negative  02/16/23 1422       Negative  08/12/22 1412    RPR  Non Reactive  08/18/22 1415    VDRL       Syphilis Antibody       HBsAg  Negative  08/18/22 1415    Herpes Simplex Virus PCR       Herpes Simplex VIrus Culture       HIV  Non Reactive  08/18/22 1415    Hep C RNA Quant PCR       Hep C Antibody  <0.1 s/co ratio 08/18/22 1415    AFP       Group B Strep  Positive  02/16/23 1422    GBS Susceptibility to Clindamycin       GBS Susceptibility to Erythromycin       Fetal Fibronectin       Genetic Testing, Maternal Blood             Drug Screening     Test Value Date Time    Urine Drug Screen       Amphetamine Screen       Barbiturate Screen       Benzodiazepine Screen       Methadone Screen       Phencyclidine Screen       Opiates Screen       THC Screen       Cocaine Screen       Propoxyphene Screen       Buprenorphine Screen       Methamphetamine Screen       Oxycodone Screen       Tricyclic Antidepressants Screen             Legend    ^: Historical                        Assessment & Plan       Pregnancy      Corrine Conte is Day 1  post-partum  Vaginal, Spontaneous   .      Plan:  Continue current care. Encouraged lactation support as needed.      This note has been signed electronically.    Nata Hughes, DNP, APRN, CNM, RNC-OB  3/21/2023

## 2023-03-22 NOTE — PROGRESS NOTES
"      NAYAN Valverde  [facility]  [address] [place] [address]  Office [phone]  Fax [phone]    [facility]  Vaginal Delivery Progress Note    Subjective   Postpartum Day 2: Vaginal Delivery    The patient feels well.  Her pain is well controlled with nonsteroidal anti-inflammatory drugs like Motrin and Norco.   She is ambulating well.  Patient describes her bleeding as thin lochia.    Breastfeeding: infant latching.    Objective     Vital Signs Range for the last 24 hours  Temperature: Temp:  [97.7 °F (36.5 °C)-98.2 °F (36.8 °C)] 98.2 °F (36.8 °C)   Temp Source: Temp src: Temporal   BP: BP: (113-135)/(69-84) 135/84   Pulse: Heart Rate:  [68-70] 70   Respirations: Resp:  [18] 18   SPO2: SpO2:  [97 %-99 %] 97 %   O2 Amount (l/min):     O2 Devices Device (Oxygen Therapy): room air   Weight:       Admit Height:  Height: 170.2 cm (67\")      Physical Exam:  General:  no acute distresss.  Abdomen: abdomen is soft without significant tenderness, masses, organomegaly or guarding. Fundus: appropriate, firm, non tender  Extremities: normal, atraumatic, no cyanosis, and trace edema.       Lab results reviewed:  Yes   Rubella:  No results found for: RUBELLAIGGIN Nurse Transcribed from prenatal record --  No components found for: EXTRUBELQUAL  Rh Status:    RH type   Date Value Ref Range Status   03/20/2023 Positive  Final     Immunizations:   Immunization History   Administered Date(s) Administered   • COVID-19 (PFIZER) PURPLE CAP 08/27/2021, 09/17/2021   • Flu Vaccine Split Quad 12/29/2017, 01/03/2020   • FluLaval/Fluzone >6mos 12/29/2017, 01/03/2020, 10/01/2020, 01/05/2022, 10/18/2022   • Fluzone Quad >6mos (Multi-dose) 12/04/2018, 10/13/2020   • Tdap 02/25/2021, 01/03/2023     Lab Results (last 24 hours)     ** No results found for the last 24 hours. **          External Prenatal Results     Pregnancy Outside Results - Transcribed From Office Records - See Scanned Records For Details     Test " Value Date Time    ABO  A  03/20/23 0110    Rh  Positive  03/20/23 0110    Antibody Screen  Negative  03/20/23 0110       Negative  08/18/22 1415    Varicella IgG       Rubella  1.17 index 08/18/22 1415    Hgb  7.8 g/dL 03/21/23 0537       9.9 g/dL 03/20/23 0110       11.6 g/dL 12/13/22 1047       12.4 g/dL 08/18/22 1415    Hct  25.4 % 03/21/23 0537       31.5 % 03/20/23 0110       36.6 % 08/18/22 1415    Glucose Fasting GTT  87 mg/dL 12/28/22 0713    Glucose Tolerance Test 1 hour  160 mg/dL 12/28/22 0713    Glucose Tolerance Test 3 hour  124 mg/dL 12/28/22 0713    Gonorrhea (discrete)  Negative  02/16/23 1422       Negative  08/12/22 1412    Chlamydia (discrete)  Negative  02/16/23 1422       Negative  08/12/22 1412    RPR  Non Reactive  08/18/22 1415    VDRL       Syphilis Antibody       HBsAg  Negative  08/18/22 1415    Herpes Simplex Virus PCR       Herpes Simplex VIrus Culture       HIV  Non Reactive  08/18/22 1415    Hep C RNA Quant PCR       Hep C Antibody  <0.1 s/co ratio 08/18/22 1415    AFP       Group B Strep  Positive  02/16/23 1422    GBS Susceptibility to Clindamycin       GBS Susceptibility to Erythromycin       Fetal Fibronectin       Genetic Testing, Maternal Blood             Drug Screening     Test Value Date Time    Urine Drug Screen       Amphetamine Screen       Barbiturate Screen       Benzodiazepine Screen       Methadone Screen       Phencyclidine Screen       Opiates Screen       THC Screen       Cocaine Screen       Propoxyphene Screen       Buprenorphine Screen       Methamphetamine Screen       Oxycodone Screen       Tricyclic Antidepressants Screen             Legend    ^: Historical                        Assessment & Plan       Pregnancy      Corrine Conte is Day 2  post-partum  Vaginal, Spontaneous   .      Plan:  Continue current care. Discharge home with standard precautions and return to clinic in 4-6 weeks. Lactation support as needed.    Plan for discharge reviewed with   Jung.    This note has been signed electronically.    Nata Hughes DNP, APRN, CNM, RNC-OB  3/22/2023  07:41 CDT

## 2023-03-22 NOTE — PLAN OF CARE
Goal Outcome Evaluation:      Vitals stable, voiding, prn pain meds control pain, using aqua k pad on epidural site, does not need any vaccines, breastfeeding infant, bonding well with infant, rested well with  at bedside

## 2023-03-22 NOTE — DISCHARGE SUMMARY
[facility]    Nata Hughes, APRN  [address]  [place] [address]  [phone]      Discharge Summary     Patricia Conte  : 1990  MRN: [id number]  CSN: [id number]    Date of Admission: 3/20/2023   Date of Discharge:  3/22/2023   Delivering Physician: Kimber Feng        Admission Diagnosis: 1. Pregnancy [Z34.90]   Discharge Diagnosis: 1. Pregnancy at 40w3d - delivered       Procedures: 3/20/2023  - Vaginal, Spontaneous       Hospital Course  Patient is a 32 y.o.  who at 40w3d had a vaginal birth.  Her postpartum course was without complications.  On PPD #2 she was ready for discharge.  She had normal lochia and pain well controlled with oral medications.    Infant  female  fetus weighing 3970 g (8 lb 12 oz)   Apgars -  7 @ 1 minute /  9 @ 5 minutes.    Discharge labs  Lab Results   Component Value Date    WBC 7.07 2023    HGB 7.8 (L) 2023    HCT 25.4 (L) 2023    PLT 91 (L) 2023       Discharge Medications     Discharge Medications      New Medications      Instructions Start Date   HYDROcodone-acetaminophen 5-325 MG per tablet  Commonly known as: Norco   1 tablet, Oral, Every 8 Hours PRN         Continue These Medications      Instructions Start Date   PRENATAL FORMULA PO   Oral             External Prenatal Results     Pregnancy Outside Results - Transcribed From Office Records - See Scanned Records For Details     Test Value Date Time    ABO  A  23 0110    Rh  Positive  23 0110    Antibody Screen  Negative  23 0110       Negative  22 1415    Varicella IgG       Rubella  1.17 index 22 1415    Hgb  7.8 g/dL 23 0537       9.9 g/dL 23 0110       11.6 g/dL 22 1047       12.4 g/dL 22 1415    Hct  25.4 % 23 0537       31.5 % 23 0110       36.6 % 22 1415    Glucose Fasting GTT  87 mg/dL 22 0713    Glucose Tolerance Test 1 hour  160 mg/dL 22  0713    Glucose Tolerance Test 3 hour  124 mg/dL 12/28/22 0713    Gonorrhea (discrete)  Negative  02/16/23 1422       Negative  08/12/22 1412    Chlamydia (discrete)  Negative  02/16/23 1422       Negative  08/12/22 1412    RPR  Non Reactive  08/18/22 1415    VDRL       Syphilis Antibody       HBsAg  Negative  08/18/22 1415    Herpes Simplex Virus PCR       Herpes Simplex VIrus Culture       HIV  Non Reactive  08/18/22 1415    Hep C RNA Quant PCR       Hep C Antibody  <0.1 s/co ratio 08/18/22 1415    AFP       Group B Strep  Positive  02/16/23 1422    GBS Susceptibility to Clindamycin       GBS Susceptibility to Erythromycin       Fetal Fibronectin       Genetic Testing, Maternal Blood             Drug Screening     Test Value Date Time    Urine Drug Screen       Amphetamine Screen       Barbiturate Screen       Benzodiazepine Screen       Methadone Screen       Phencyclidine Screen       Opiates Screen       THC Screen       Cocaine Screen       Propoxyphene Screen       Buprenorphine Screen       Methamphetamine Screen       Oxycodone Screen       Tricyclic Antidepressants Screen             Legend    ^: Historical                        Discharge Disposition Home or Self Care   Condition on Discharge: good   Follow-up: 6 weeks with Dr. Flores or CHASIDY Hughes       Plan for discharge reviewed with Dr. Feng.    This note has been signed electronically.    Nata Hughes, DNP, APRN, CHASIDY, RNC-OB  3/22/2023

## 2023-04-05 LAB
CYTO UR: NORMAL
LAB AP CASE REPORT: NORMAL
Lab: NORMAL
PATH REPORT.FINAL DX SPEC: NORMAL
PATH REPORT.GROSS SPEC: NORMAL

## 2023-04-10 ENCOUNTER — OFFICE VISIT (OUTPATIENT)
Dept: OBSTETRICS AND GYNECOLOGY | Facility: CLINIC | Age: 33
End: 2023-04-10
Payer: COMMERCIAL

## 2023-04-10 VITALS
BODY MASS INDEX: 24.48 KG/M2 | WEIGHT: 156 LBS | HEIGHT: 67 IN | SYSTOLIC BLOOD PRESSURE: 114 MMHG | DIASTOLIC BLOOD PRESSURE: 82 MMHG

## 2023-04-10 DIAGNOSIS — R33.9 UNABLE TO VOID: ICD-10-CM

## 2023-04-10 DIAGNOSIS — K64.9 HEMORRHOIDS, UNSPECIFIED HEMORRHOID TYPE: Primary | ICD-10-CM

## 2023-04-10 LAB
BILIRUB BLD-MCNC: NEGATIVE MG/DL
CLARITY, POC: CLEAR
COLOR UR: YELLOW
GLUCOSE UR STRIP-MCNC: NEGATIVE MG/DL
KETONES UR QL: NEGATIVE
LEUKOCYTE EST, POC: NEGATIVE
NITRITE UR-MCNC: NEGATIVE MG/ML
PH UR: 7.5 [PH] (ref 5–8)
PROT UR STRIP-MCNC: ABNORMAL MG/DL
RBC # UR STRIP: ABNORMAL /UL
SP GR UR: 1.01 (ref 1–1.03)
UROBILINOGEN UR QL: NORMAL

## 2023-04-10 PROCEDURE — 81002 URINALYSIS NONAUTO W/O SCOPE: CPT | Performed by: NURSE PRACTITIONER

## 2023-04-10 PROCEDURE — 99213 OFFICE O/P EST LOW 20 MIN: CPT | Performed by: NURSE PRACTITIONER

## 2023-04-10 RX ORDER — CEPHALEXIN 500 MG/1
500 CAPSULE ORAL 2 TIMES DAILY
Qty: 14 CAPSULE | Refills: 0 | Status: SHIPPED | OUTPATIENT
Start: 2023-04-10 | End: 2023-05-01

## 2023-04-10 RX ORDER — LIDOCAINE 50 MG/G
1 OINTMENT TOPICAL
Qty: 30 G | Refills: 0 | Status: SHIPPED | OUTPATIENT
Start: 2023-04-10 | End: 2023-05-01

## 2023-04-10 RX ORDER — PRAMOXINE HYDROCHLORIDE 10 MG/G
AEROSOL, FOAM TOPICAL
Qty: 15 G | Refills: 1 | Status: SHIPPED | OUTPATIENT
Start: 2023-04-10 | End: 2023-05-01

## 2023-04-10 NOTE — PROGRESS NOTES
Chief Complaint  Hemorrhoids (Pt is here with c/o possible hemorrhoids.  Pt states that they have gotten bad enough that it hurts to walk, has not been able to urinate and has not peed since 6am this morning and has been drinking plenty of fluids.  )    Subjective          Corrine Conte presents to [facility]  History of Present Illness    The patient presents to the office today for follow up. She is accompanied by an adult male.     The patient gave birth 3 weeks ago.   She did have hemorrhoids during her pregnancy.   She denies her hemorrhoids flaring as bad as they are today, 04/10/2023.   She has had bowel movements since her delivery.   She has a bowel movement every 3 to 4 days.  Her bowel movements fluctuate between constipation and diarrhea.   The patient has been using lidocaine with minimal relief.  She has more pain now than she had after her delivery.   The patient is breast-feeding.   The patient took pain medication during her stay in the hospital.   She took approximately 2 pain pills after being discharged.   The hospital sent her home with a pillow to sit on.     The patient was prescribed Diflucan for a yeast infection after having her child.   She denies lingering symptoms.    She drinks 5 16-ounce bottles of water daily.     The patient had problems with urination the morning of 04/10/2023.   She feels as though she is unable to relax her muscles.   Her last time urinating was at 6 AM this morning, 04/10/2023.  Her urine is either clear or yellow in color.     The patient has a bump on her left labia.   She denies pain.     Review of Systems   Constitutional: Negative for activity change, appetite change, fatigue and fever.   Respiratory: Negative for apnea and shortness of breath.    Cardiovascular: Negative for chest pain and palpitations.   Gastrointestinal: Negative for abdominal distention, abdominal pain, constipation, diarrhea, nausea and vomiting.   Endocrine:  "Negative for cold intolerance and heat intolerance.   Genitourinary: Negative for difficulty urinating, frequency, menstrual problem, pelvic pain, vaginal discharge and vaginal pain.   Neurological: Negative for headaches.   Psychiatric/Behavioral: Negative for agitation and sleep disturbance.         Objective   Vital Signs:   /82   Ht 170.2 cm (67\")   Wt 70.8 kg (156 lb)   BMI 24.43 kg/m²     Physical Exam  Exam conducted with a chaperone present.   Constitutional:       Appearance: She is well-developed.   HENT:      Head: Normocephalic.   Neck:      Trachea: No tracheal deviation.   Cardiovascular:      Rate and Rhythm: Normal rate.   Pulmonary:      Breath sounds: Normal breath sounds. No wheezing.   Abdominal:      Palpations: Abdomen is soft.      Tenderness: There is no abdominal tenderness.   Genitourinary:     Exam position: Lithotomy position.      Labia:         Right: No rash, tenderness or lesion.         Left: No rash, tenderness or lesion.       Vagina: No vaginal discharge, erythema or tenderness.      Cervix: No cervical motion tenderness or discharge.      Uterus: Not deviated, not enlarged and not tender.       Adnexa:         Right: No mass, tenderness or fullness.          Left: No mass, tenderness or fullness.        Rectum: External hemorrhoid present.   Skin:     General: Skin is warm and dry.      Findings: No rash.   Neurological:      Mental Status: She is alert and oriented to person, place, and time.   Psychiatric:         Speech: Speech normal.         Behavior: Behavior normal.         Result Review :                       Assessment and Plan      The patient was advised to increase her water intake while breast-feeding.   The patient will be prescribed proctofoam.   The patient will be prescribed a rectal rocket.     The patient was catheterized today.   She will have a urinalysis sent off for culture.   Keflex sent to pharmacy.     Diagnoses and all orders for this " visit:    1. Hemorrhoids, unspecified hemorrhoid type (Primary)    2. Unable to void  -     POC Urinalysis Dipstick  -     Urine Culture - Urine, Urine, Catheter In/Out    Other orders  -     Pramoxine HCl, Perianal, (Proctofoam) 1 % foam; Insert  into the rectum Every 2 (Two) Hours As Needed for Hemorrhoids.  Dispense: 15 g; Refill: 1  -     lidocaine (XYLOCAINE) 5 % ointment; Apply 1 application topically to the appropriate area as directed Every 2 (Two) Hours As Needed for Mild Pain.  Dispense: 30 g; Refill: 0  -     cephalexin (Keflex) 500 MG capsule; Take 1 capsule by mouth 2 (Two) Times a Day.  Dispense: 14 capsule; Refill: 0          BMI is within normal parameters. No other follow-up for BMI required.       Follow Up   Return in about 1 week (around 4/17/2023).    Patient was given instructions and counseling regarding her condition or for health maintenance advice. Please see specific information pulled into the AVS if appropriate.     Transcribed from ambient dictation for NAYAN Latham by Teresa Soto.  04/10/23   14:45 CDT    Patient or patient representative verbalized consent to the visit recording.  I have personally performed the services described in this document as transcribed by the above individual, and it is both accurate and complete.  NAYAN Latham  4/16/2023  14:56 CDT

## 2023-04-12 LAB
BACTERIA UR CULT: NO GROWTH
BACTERIA UR CULT: NORMAL

## 2023-04-13 ENCOUNTER — TELEPHONE (OUTPATIENT)
Dept: OBSTETRICS AND GYNECOLOGY | Facility: CLINIC | Age: 33
End: 2023-04-13
Payer: COMMERCIAL

## 2023-04-13 RX ORDER — HYDROCORTISONE 25 MG/G
CREAM TOPICAL 2 TIMES DAILY
Qty: 28 G | Refills: 1 | Status: SHIPPED | OUTPATIENT
Start: 2023-04-13

## 2023-04-13 NOTE — TELEPHONE ENCOUNTER
----- Message from NAYAN Latham sent at 4/12/2023  1:10 PM CDT -----  No growth on urine culture.     How has pt been feeling r/t hemorrhoids?    MyChart message sent.

## 2023-04-13 NOTE — TELEPHONE ENCOUNTER
Pt states that the hemorrhoids are still bothering her but they seem to be improving.  Pt was not able to get Proctofoam as insurance would not cover it and it was going to be too costly.  Do you want to send something else in for patient to try or have her continue with the rectal rockets.  Pt aware that if you send something else in, I will contact her to let her know

## 2023-04-16 NOTE — PATIENT INSTRUCTIONS

## 2023-05-01 ENCOUNTER — POSTPARTUM VISIT (OUTPATIENT)
Dept: OBSTETRICS AND GYNECOLOGY | Facility: CLINIC | Age: 33
End: 2023-05-01
Payer: COMMERCIAL

## 2023-05-01 VITALS
WEIGHT: 164 LBS | SYSTOLIC BLOOD PRESSURE: 118 MMHG | HEIGHT: 67 IN | BODY MASS INDEX: 25.74 KG/M2 | DIASTOLIC BLOOD PRESSURE: 82 MMHG

## 2023-05-01 DIAGNOSIS — Z30.011 ENCOUNTER FOR INITIAL PRESCRIPTION OF CONTRACEPTIVE PILLS: ICD-10-CM

## 2023-05-01 PROBLEM — O34.40 HISTORY OF CERVICAL LEEP BIOPSY AFFECTING CARE OF MOTHER, ANTEPARTUM: Status: RESOLVED | Noted: 2019-05-01 | Resolved: 2023-05-01

## 2023-05-01 PROBLEM — O09.299 PRIOR PREGNANCY WITH FETAL DEMISE: Status: RESOLVED | Noted: 2022-08-15 | Resolved: 2023-05-01

## 2023-05-01 PROBLEM — Z34.90 PREGNANCY: Status: RESOLVED | Noted: 2023-03-20 | Resolved: 2023-05-01

## 2023-05-01 PROBLEM — Z98.890 HISTORY OF CERVICAL LEEP BIOPSY AFFECTING CARE OF MOTHER, ANTEPARTUM: Status: RESOLVED | Noted: 2019-05-01 | Resolved: 2023-05-01

## 2023-05-01 PROBLEM — O09.899 HISTORY OF PRETERM DELIVERY, CURRENTLY PREGNANT: Status: RESOLVED | Noted: 2022-08-15 | Resolved: 2023-05-01

## 2023-05-01 RX ORDER — LEVONORGESTREL AND ETHINYL ESTRADIOL 0.1-0.02MG
1 KIT ORAL DAILY
Qty: 28 TABLET | Refills: 12 | Status: SHIPPED | OUTPATIENT
Start: 2023-05-01 | End: 2024-04-30

## 2023-05-01 NOTE — PROGRESS NOTES
"Corrine Conte is here for a postpartum visit after a vaginal delivery 6 weeks ago.  The depression questionnaire has been completed.  She has no signs of postpartum depression today.  She has not had a period since her delivery and is breast-feeding her infant without difficulty.  Her last Pap smear was 1/2022 and normal, HPV negative.  She has no history of cervical dysplasia.  She would like OCPs for contraception.    /82 (BP Location: Left arm, Patient Position: Sitting)   Ht 170.2 cm (67\")   Wt 74.4 kg (164 lb)   LMP 06/09/2022   Breastfeeding Yes   BMI 25.69 kg/m²    In general pleasant female no acute distress  Neck no thyromegaly  Abdomen soft and nontender  A Pap smear was not performed.     Assessment: Normal postpartum exam.    We have discussed current Pap smear screening guidelines. I have given her a prescription for a birth control pill and we have discussed common side effects and adverse events. Corrine will return in 1 year or sooner if needed.  "

## 2023-08-04 ENCOUNTER — OFFICE VISIT (OUTPATIENT)
Dept: OBSTETRICS AND GYNECOLOGY | Facility: CLINIC | Age: 33
End: 2023-08-04
Payer: COMMERCIAL

## 2023-08-04 VITALS
SYSTOLIC BLOOD PRESSURE: 104 MMHG | HEIGHT: 67 IN | BODY MASS INDEX: 24.48 KG/M2 | DIASTOLIC BLOOD PRESSURE: 78 MMHG | WEIGHT: 156 LBS

## 2023-08-04 DIAGNOSIS — N90.7 SEBACEOUS CYST OF LABIA: Primary | ICD-10-CM

## 2023-08-04 RX ORDER — TRIAMCINOLONE ACETONIDE 1 MG/G
CREAM TOPICAL
COMMUNITY
Start: 2023-08-02

## 2023-09-29 NOTE — ANESTHESIA POSTPROCEDURE EVALUATION
"Patient: Corrine Conte    Procedure Summary     Date: 03/20/23 Room / Location:     Anesthesia Start: 0140 Anesthesia Stop: 0230    Procedure: LABOR ANALGESIA Diagnosis:     Scheduled Providers:  Provider: Armando Hoskins CRNA    Anesthesia Type: epidural ASA Status: 2          Anesthesia Type: epidural    Vitals  Vitals Value Taken Time   /68 03/20/23 1222   Temp 98 °F (36.7 °C) 03/20/23 1222   Pulse 68 03/20/23 1222   Resp 18 03/20/23 1222   SpO2 99 % 03/20/23 1222           Post Anesthesia Care and Evaluation    Patient location during evaluation: floor  Patient participation: complete - patient participated  Level of consciousness: awake and alert  Pain management: satisfactory to patient    Airway patency: patent  Anesthetic complications: No anesthetic complications  PONV Status: none  Cardiovascular status: acceptable  Respiratory status: acceptable  Hydration status: acceptable  Post Neuraxial Block status: Motor and sensory function returned to baseline and No signs or symptoms of PDPH  Comments: Blood pressure 109/68, pulse 68, temperature 98 °F (36.7 °C), temperature source Oral, resp. rate 18, height 170.2 cm (67\"), weight 89.5 kg (197 lb 6.4 oz), last menstrual period 06/09/2022, SpO2 99 %, unknown if currently breastfeeding.        " In an effort to ensure that our patients [employer], a Team Member has reviewed your chart and identified an opportunity to provide the best care possible. An attempt was made to discuss or schedule overdue Preventive or Disease Management screening.     The Outcome was Contact was made, care gap was discussed - see further documentation. Care Gaps include Diabetes.      Pt agreed to have A1C checked within a week, and A1C lab order was placed in for pt to have it done at her own convenience.

## 2024-05-03 ENCOUNTER — OFFICE VISIT (OUTPATIENT)
Dept: OBSTETRICS AND GYNECOLOGY | Age: 34
End: 2024-05-03
Payer: COMMERCIAL

## 2024-05-03 VITALS
WEIGHT: 160 LBS | HEIGHT: 67 IN | SYSTOLIC BLOOD PRESSURE: 108 MMHG | BODY MASS INDEX: 25.11 KG/M2 | DIASTOLIC BLOOD PRESSURE: 76 MMHG

## 2024-05-03 DIAGNOSIS — Z78.9 NON-SMOKER: ICD-10-CM

## 2024-05-03 DIAGNOSIS — Z12.31 ENCOUNTER FOR MAMMOGRAM TO ESTABLISH BASELINE MAMMOGRAM: ICD-10-CM

## 2024-05-03 DIAGNOSIS — Z01.419 ENCOUNTER FOR GYNECOLOGICAL EXAMINATION WITHOUT ABNORMAL FINDING: Primary | ICD-10-CM

## 2024-05-03 PROCEDURE — 87591 N.GONORRHOEAE DNA AMP PROB: CPT | Performed by: NURSE PRACTITIONER

## 2024-05-03 PROCEDURE — 87661 TRICHOMONAS VAGINALIS AMPLIF: CPT | Performed by: NURSE PRACTITIONER

## 2024-05-03 PROCEDURE — 87491 CHLMYD TRACH DNA AMP PROBE: CPT | Performed by: NURSE PRACTITIONER

## 2024-05-03 PROCEDURE — G0123 SCREEN CERV/VAG THIN LAYER: HCPCS | Performed by: NURSE PRACTITIONER

## 2024-05-03 PROCEDURE — 87624 HPV HI-RISK TYP POOLED RSLT: CPT | Performed by: NURSE PRACTITIONER

## 2024-05-03 RX ORDER — MOMETASONE FUROATE 1 MG/ML
SOLUTION TOPICAL
COMMUNITY
Start: 2024-01-31

## 2024-05-03 RX ORDER — ALBUTEROL SULFATE 90 UG/1
AEROSOL, METERED RESPIRATORY (INHALATION)
COMMUNITY

## 2024-05-03 RX ORDER — NORETHINDRONE ACETATE AND ETHINYL ESTRADIOL 1MG-20(21)
1 KIT ORAL DAILY
Qty: 84 TABLET | Refills: 3 | Status: SHIPPED | OUTPATIENT
Start: 2024-05-03

## 2024-05-03 NOTE — PATIENT INSTRUCTIONS

## 2024-05-03 NOTE — PROGRESS NOTES
Chief Complaint  Annual Exam (Pt is here for an annual exam/Last pap 1/17/22 WNL /Pt has no complaints today )    Subjective          Corrine Conte presents to [facility]  History of Present Illness  The patient is a 34-year-old female who presents for evaluation of multiple medical concerns.    The patient gave birth to her last child in 03/2023.  She continues to experience lactation, having ceased breastfeeding approximately 8 weeks ago.   This lactation did not occur post-weaning her eldest child.     She continues her contraceptive regimen, having initiated it 2 months post-prescription.   Following the initiation, she experienced two monthly menstrual cycles for approximately three months, which ceased for a month before resuming.   Currently, she experiences one menstrual cycle per month, typically starting slightly prior to the finishing of the pill pack.   Prior to this, her menstrual cycles were regular, lasting five days, and without intermenstrual bleeding.    She expresses a desire to undergo STD testing as a precautionary measure.   She had an abnormal Pap smear in 2010, which led to a LEEP procedure.   She was undergoing annual Pap smears until her pregnancy.   She experiences constipation, a symptom she has always had.   She denies experiencing dysuria or urinary incontinence.   She denies experiencing breast pain.    Review of Systems   Constitutional:  Negative for activity change, appetite change, fatigue and fever.        Continues to lactate    HENT:  Negative for congestion, sore throat and trouble swallowing.    Eyes:  Negative for pain, discharge and visual disturbance.   Respiratory:  Negative for apnea, shortness of breath and wheezing.    Cardiovascular:  Negative for chest pain, palpitations and leg swelling.   Gastrointestinal:  Negative for abdominal pain, constipation and diarrhea.   Genitourinary:  Negative for frequency, pelvic pain, urgency and vaginal  "discharge. Menstrual problem: heavy with cramping.  Musculoskeletal:  Negative for back pain and gait problem.   Skin:  Negative for color change and rash.   Neurological:  Negative for dizziness, weakness and numbness.   Psychiatric/Behavioral:  Negative for confusion and sleep disturbance.         Objective   Vital Signs:   /76   Ht 170.2 cm (67\")   Wt 72.6 kg (160 lb)   BMI 25.06 kg/m²     Physical Exam  Vitals and nursing note reviewed. Exam conducted with a chaperone present.   Constitutional:       General: She is not in acute distress.     Appearance: She is well-developed. She is not diaphoretic.   HENT:      Head: Normocephalic.      Right Ear: External ear normal.      Left Ear: External ear normal.      Nose: Nose normal.   Eyes:      General: No scleral icterus.        Right eye: No discharge.         Left eye: No discharge.      Conjunctiva/sclera: Conjunctivae normal.      Pupils: Pupils are equal, round, and reactive to light.   Neck:      Thyroid: No thyromegaly.      Vascular: No carotid bruit.      Trachea: No tracheal deviation.   Cardiovascular:      Rate and Rhythm: Normal rate and regular rhythm.      Heart sounds: Normal heart sounds. No murmur heard.  Pulmonary:      Effort: Pulmonary effort is normal. No respiratory distress.      Breath sounds: Normal breath sounds. No wheezing.   Chest:   Breasts:     Breasts are symmetrical.      Right: Normal. No swelling, bleeding, inverted nipple, mass, nipple discharge, skin change or tenderness.      Left: Normal. No swelling, bleeding, inverted nipple, mass, nipple discharge, skin change or tenderness.   Abdominal:      General: There is no distension.      Palpations: Abdomen is soft. There is no mass.      Tenderness: There is no abdominal tenderness. There is no right CVA tenderness, left CVA tenderness or guarding.      Hernia: No hernia is present. There is no hernia in the left inguinal area or right inguinal area.   Genitourinary:    "  General: Normal vulva.      Exam position: Lithotomy position.      Labia:         Right: No rash, tenderness, lesion or injury.         Left: No rash, tenderness, lesion or injury.       Vagina: Normal. No signs of injury and foreign body. No vaginal discharge, erythema, tenderness or bleeding.      Cervix: Normal.      Uterus: Normal. Not enlarged, not fixed and not tender.       Adnexa: Right adnexa normal and left adnexa normal.        Right: No mass, tenderness or fullness.          Left: No mass, tenderness or fullness.        Rectum: Normal. No mass.      Comments:   BSU normal  Urethral meatus  Normal  Perineum  Normal  Musculoskeletal:         General: No tenderness. Normal range of motion.      Cervical back: Normal range of motion and neck supple.   Lymphadenopathy:      Head:      Right side of head: No submental, submandibular, tonsillar, preauricular, posterior auricular or occipital adenopathy.      Left side of head: No submental, submandibular, tonsillar, preauricular, posterior auricular or occipital adenopathy.      Cervical: No cervical adenopathy.      Right cervical: No superficial, deep or posterior cervical adenopathy.     Left cervical: No superficial, deep or posterior cervical adenopathy.      Upper Body:      Right upper body: No supraclavicular, axillary or pectoral adenopathy.      Left upper body: No supraclavicular, axillary or pectoral adenopathy.      Lower Body: No right inguinal adenopathy. No left inguinal adenopathy.   Skin:     General: Skin is warm and dry.      Findings: No bruising, erythema or rash.   Neurological:      Mental Status: She is alert and oriented to person, place, and time.      Coordination: Coordination normal.   Psychiatric:         Mood and Affect: Mood normal.         Behavior: Behavior normal.         Thought Content: Thought content normal.         Judgment: Judgment normal.         Result Review :   The following data was reviewed by: Gracie Lara  NAYAN Hernandez on 05/03/2024:             Current Outpatient Medications on File Prior to Visit   Medication Sig    albuterol sulfate  (90 Base) MCG/ACT inhaler INHALE 2 PUFFS BY MOUTH EVERY 4 HOURS    mometasone (ELOCON) 0.1 % solution APPLY TO EARS AND SCALP THREE TIMES DAILY AS NEEDED FOR FLARES     No current facility-administered medications on file prior to visit.          Assessment and Plan      Well woman GYN exam.   Pap smear collected r/t pt hx of LEEP.   Pelvic exam with chaperone present.     Will have lab work at PCP.     Discussed STD prevention and testing.   Chlamydia/Gonorrhea/Trichomonas ordered.   RPR, Hep panel and HIV declined.     Mammogram will be scheduled at [facility].     Discussed patient's bleeding with current OCP.  Discussed options moving forward related to OCP.  Patient opts to change OCP.  Microgestin 1/20 sent to pharmacy on file.  Instructed pt about beginning OCP, side effects and S&S to report. Pt voiced understanding.    Diagnoses and all orders for this visit:    1. Encounter for gynecological examination without abnormal finding (Primary)  -     Liquid-based Pap Smear, Screening  -     Trichomonas vaginalis, PCR - ThinPrep Vial, Cervix  -     HPV DNA Probe, Direct - ThinPrep Vial, Cervix  -     Chlamydia trachomatis, Neisseria gonorrhoeae, PCR - ThinPrep Vial, Cervix    2. Encounter for mammogram to establish baseline mammogram  -     Mammo Screening Digital Tomosynthesis Bilateral With CAD; Future    3. Non-smoker    Other orders  -     norethindrone-ethinyl estradiol FE (Microgestin FE 1/20) 1-20 MG-MCG per tablet; Take 1 tablet by mouth Daily.  Dispense: 84 tablet; Refill: 3          BMI is within normal parameters. No other follow-up for BMI required.       Follow Up   Return for Annual physical.    Patient was given instructions and counseling regarding her condition or for health maintenance advice. Please see specific information pulled into the AVS if appropriate.      Transcribed from ambient dictation for NAYAN Latham by Ronnell Castañeda.  05/03/24   11:22 CDT    Patient or patient representative verbalized consent to the visit recording.  I have personally performed the services described in this document as transcribed by the above individual, and it is both accurate and complete.  NAYAN Ltaham  5/15/2024  21:27 CDT

## 2024-05-07 LAB
C TRACH RRNA CVX QL NAA+PROBE: NOT DETECTED
GEN CATEG CVX/VAG CYTO-IMP: ABNORMAL
HPV I/H RISK 4 DNA CVX QL PROBE+SIG AMP: NOT DETECTED
LAB AP CASE REPORT: ABNORMAL
LAB AP GYN ADDITIONAL INFORMATION: ABNORMAL
LAB AP GYN OTHER FINDINGS: ABNORMAL
Lab: ABNORMAL
N GONORRHOEA RRNA SPEC QL NAA+PROBE: NOT DETECTED
PATH INTERP SPEC-IMP: ABNORMAL
STAT OF ADQ CVX/VAG CYTO-IMP: ABNORMAL
TRICHOMONAS VAGINALIS PCR: NOT DETECTED

## 2024-05-14 LAB
NCCN CRITERIA FLAG: NORMAL
TYRER CUZICK SCORE: 11.6

## 2024-05-24 ENCOUNTER — HOSPITAL ENCOUNTER (OUTPATIENT)
Dept: MAMMOGRAPHY | Facility: HOSPITAL | Age: 34
Discharge: HOME | End: 2024-05-24
Admitting: NURSE PRACTITIONER
Payer: COMMERCIAL

## 2024-05-24 DIAGNOSIS — Z12.31 ENCOUNTER FOR MAMMOGRAM TO ESTABLISH BASELINE MAMMOGRAM: ICD-10-CM

## 2024-05-24 PROCEDURE — 77067 SCR MAMMO BI INCL CAD: CPT

## 2024-05-24 PROCEDURE — 77063 BREAST TOMOSYNTHESIS BI: CPT

## 2025-05-05 ENCOUNTER — OFFICE VISIT (OUTPATIENT)
Age: 35
End: 2025-05-05
Payer: COMMERCIAL

## 2025-05-05 VITALS
HEIGHT: 67 IN | BODY MASS INDEX: 25.74 KG/M2 | SYSTOLIC BLOOD PRESSURE: 108 MMHG | WEIGHT: 164 LBS | DIASTOLIC BLOOD PRESSURE: 68 MMHG

## 2025-05-05 DIAGNOSIS — Z12.31 SCREENING MAMMOGRAM, ENCOUNTER FOR: ICD-10-CM

## 2025-05-05 DIAGNOSIS — Z30.40 ENCOUNTER FOR SURVEILLANCE OF CONTRACEPTIVES, UNSPECIFIED CONTRACEPTIVE: ICD-10-CM

## 2025-05-05 DIAGNOSIS — Z01.419 ENCOUNTER FOR GYNECOLOGICAL EXAMINATION: Primary | ICD-10-CM

## 2025-05-05 PROCEDURE — 87624 HPV HI-RISK TYP POOLED RSLT: CPT | Performed by: NURSE PRACTITIONER

## 2025-05-05 PROCEDURE — 87491 CHLMYD TRACH DNA AMP PROBE: CPT | Performed by: NURSE PRACTITIONER

## 2025-05-05 PROCEDURE — 87661 TRICHOMONAS VAGINALIS AMPLIF: CPT | Performed by: NURSE PRACTITIONER

## 2025-05-05 PROCEDURE — G0123 SCREEN CERV/VAG THIN LAYER: HCPCS | Performed by: NURSE PRACTITIONER

## 2025-05-05 PROCEDURE — 87591 N.GONORRHOEAE DNA AMP PROB: CPT | Performed by: NURSE PRACTITIONER

## 2025-05-05 RX ORDER — ETONOGESTREL AND ETHINYL ESTRADIOL VAGINAL RING .015; .12 MG/D; MG/D
1 RING VAGINAL
Qty: 3 EACH | Refills: 3 | Status: SHIPPED | OUTPATIENT
Start: 2025-05-05

## 2025-05-05 RX ORDER — KETOCONAZOLE 20 MG/ML
SHAMPOO, SUSPENSION TOPICAL
COMMUNITY
Start: 2025-02-04

## 2025-05-05 RX ORDER — PNV NO.95/FERROUS FUM/FOLIC AC 28MG-0.8MG
1 TABLET ORAL DAILY
COMMUNITY

## 2025-05-05 NOTE — PATIENT INSTRUCTIONS

## 2025-05-05 NOTE — PROGRESS NOTES
Chief Complaint  Annual Exam (Pt is here for an annual exam /Last pap 5/3/24 ASCUS/Last mammogram 5/24/24 Normal /Pt has no complaints today )  History of Present Illness  The patient is a 35-year-old female who presents for an annual exam.     She has been utilizing oral contraceptives since age 15, with a brief interruption in 2017, and resumed their use approximately 2 years ago. She reports regular menstrual cycles and manageable periods while on the medication. However, she admits to occasional forgetfulness in taking the pills, which was exacerbated by a recent one-week lapse due to a delay in pharmacy refills. She and her  are considering a vasectomy as they have decided against having more children. She is also contemplating the use of NuvaRing as an alternative to oral contraceptives. She recalls experiencing 2 menstrual cycles per month for a duration of 5 to 6 months when she initially started birth control.    She reports intermittent constipation, which she manages with MiraLAX as needed. She does not experience any urinary symptoms or incontinence.    She expresses interest in STD testing but declines blood work for HIV, hepatitis, and syphilis.       Subjective          Corrine Conte presents to [facility]  History of Present Illness    Review of Systems   Constitutional:  Negative for activity change, appetite change, fatigue and fever.   HENT:  Negative for congestion, sore throat and trouble swallowing.    Eyes:  Negative for pain, discharge and visual disturbance.   Respiratory:  Negative for apnea, shortness of breath and wheezing.    Cardiovascular:  Negative for chest pain, palpitations and leg swelling.   Gastrointestinal:  Positive for constipation (Miralax used prn). Negative for abdominal pain and diarrhea.   Genitourinary:  Negative for frequency, pelvic pain, urgency and vaginal discharge.        OCP but forgetting at times.    Musculoskeletal:  Negative  "for back pain and gait problem.   Skin:  Negative for color change and rash.   Neurological:  Negative for dizziness, weakness and numbness.   Psychiatric/Behavioral:  Negative for confusion and sleep disturbance.         Objective   Vital Signs:   /68   Ht 170.2 cm (67\")   Wt 74.4 kg (164 lb)   BMI 25.69 kg/m²     Physical Exam  Vitals and nursing note reviewed. Exam conducted with a chaperone present.   Constitutional:       General: She is not in acute distress.     Appearance: She is well-developed. She is not diaphoretic.   HENT:      Head: Normocephalic.      Right Ear: External ear normal.      Left Ear: External ear normal.      Nose: Nose normal.   Eyes:      General: No scleral icterus.        Right eye: No discharge.         Left eye: No discharge.      Conjunctiva/sclera: Conjunctivae normal.      Pupils: Pupils are equal, round, and reactive to light.   Neck:      Thyroid: No thyromegaly.      Vascular: No carotid bruit.      Trachea: No tracheal deviation.   Cardiovascular:      Rate and Rhythm: Normal rate and regular rhythm.      Heart sounds: Normal heart sounds. No murmur heard.  Pulmonary:      Effort: Pulmonary effort is normal. No respiratory distress.      Breath sounds: Normal breath sounds. No wheezing.   Chest:   Breasts:     Breasts are symmetrical.      Right: Normal. No swelling, bleeding, inverted nipple, mass, nipple discharge, skin change or tenderness.      Left: Normal. No swelling, bleeding, inverted nipple, mass, nipple discharge, skin change or tenderness.   Abdominal:      General: There is no distension.      Palpations: Abdomen is soft. There is no mass.      Tenderness: There is no abdominal tenderness. There is no right CVA tenderness, left CVA tenderness or guarding.      Hernia: No hernia is present. There is no hernia in the left inguinal area or right inguinal area.   Genitourinary:     General: Normal vulva.      Exam position: Lithotomy position.      Labia:    "      Right: No rash, tenderness, lesion or injury.         Left: No rash, tenderness, lesion or injury.       Vagina: Normal. No signs of injury and foreign body. No vaginal discharge, erythema, tenderness or bleeding.      Cervix: Normal.      Uterus: Normal. Not enlarged, not fixed and not tender.       Adnexa: Right adnexa normal and left adnexa normal.        Right: No mass, tenderness or fullness.          Left: No mass, tenderness or fullness.        Rectum: Normal. No mass.      Comments:   BSU normal  Urethral meatus  Normal  Perineum  Normal  Musculoskeletal:         General: No tenderness. Normal range of motion.      Cervical back: Normal range of motion and neck supple.   Lymphadenopathy:      Head:      Right side of head: No submental, submandibular, tonsillar, preauricular, posterior auricular or occipital adenopathy.      Left side of head: No submental, submandibular, tonsillar, preauricular, posterior auricular or occipital adenopathy.      Cervical: No cervical adenopathy.      Right cervical: No superficial, deep or posterior cervical adenopathy.     Left cervical: No superficial, deep or posterior cervical adenopathy.      Upper Body:      Right upper body: No supraclavicular, axillary or pectoral adenopathy.      Left upper body: No supraclavicular, axillary or pectoral adenopathy.      Lower Body: No right inguinal adenopathy. No left inguinal adenopathy.   Skin:     General: Skin is warm and dry.      Findings: No bruising, erythema or rash.   Neurological:      Mental Status: She is alert and oriented to person, place, and time.      Coordination: Coordination normal.   Psychiatric:         Mood and Affect: Mood normal.         Behavior: Behavior normal.         Thought Content: Thought content normal.         Judgment: Judgment normal.       Result Review :   The following data was reviewed by: NAYAN Latham on 05/05/2025:    Data reviewed : Radiologic studies mammogram           Current Outpatient Medications on File Prior to Visit   Medication Sig    ketoconazole (NIZORAL) 2 % shampoo APPLY TO SCALP WHEN WASHING HAIR 2-3 TIMES A WEEK    mometasone (ELOCON) 0.1 % solution APPLY TO EARS AND SCALP THREE TIMES DAILY AS NEEDED FOR FLARES    Prenatal Vit-Fe Fumarate-FA (Prenatal Vitamin) 27-0.8 MG tablet 1 tablet Daily.    [DISCONTINUED] norethindrone-ethinyl estradiol FE (Microgestin FE 1/20) 1-20 MG-MCG per tablet Take 1 tablet by mouth Daily.    albuterol sulfate  (90 Base) MCG/ACT inhaler INHALE 2 PUFFS BY MOUTH EVERY 4 HOURS (Patient not taking: Reported on 5/5/2025)     No current facility-administered medications on file prior to visit.          Assessment and Plan      Well woman GYN exam.   Pap smear done per ASCCP guidelines.   Pelvic exam with chaperone present.     Will have lab work at PCP.     Discussed STD prevention and testing.   Chlamydia/Gonorrhea/Trichomonas ordered.   RPR, Hep panel and HIV declined.     Mammogram will be scheduled at [facility].     Assessment & Plan  1. Contraceptive management.  Various contraceptive options were discussed, including birth control pills, patches, rings, and IUDs. The NuvaRing was recommended due to its ease of use and ability to maintain hormonal balance.  - Prescription for NuvaRing sent to pharmacy  - Advised to start using the NuvaRing on the Sunday after the next period begins to help synchronize the cycle  - If  undergoes a vasectomy and it is proven effective, may discontinue the NuvaRing    2. Constipation.  Experiences intermittent constipation and uses MiraLAX as needed.  - Advised to ensure adequate water intake when using MiraLAX to enhance its effectiveness        Diagnoses and all orders for this visit:    1. Encounter for gynecological examination (Primary)  -     Liquid-based Pap Smear, Screening    2. Screening mammogram, encounter for  -     Mammo Screening Digital Tomosynthesis Bilateral With CAD; Future    3.  Encounter for surveillance of contraceptives, unspecified contraceptive    Other orders  -     etonogestrel-ethinyl estradiol (NuvaRing) 0.12-0.015 MG/24HR vaginal ring; Insert 1 each into the vagina Every 28 (Twenty-Eight) Days. Insert vaginally and leave in place for 3 consecutive weeks, then remove for 1 week.  Dispense: 3 each; Refill: 3          BMI is >= 25 and <30. (Overweight) The following options were offered after discussion;: information on healthy weight added to patient's after visit summary        Follow Up   Return for Annual physical.    Patient was given instructions and counseling regarding her condition or for health maintenance advice. Please see specific information pulled into the AVS if appropriate.     Patient or patient representative verbalized consent for the use of Ambient Listening during the visit with  NAYAN Latham for chart documentation. 5/5/2025  09:06 CDT

## 2025-05-06 LAB
C TRACH RRNA CVX QL NAA+PROBE: NOT DETECTED
GEN CATEG CVX/VAG CYTO-IMP: NORMAL
HPV I/H RISK 4 DNA CVX QL PROBE+SIG AMP: NOT DETECTED
LAB AP CASE REPORT: NORMAL
LAB AP GYN ADDITIONAL INFORMATION: NORMAL
LAB AP GYN OTHER FINDINGS: NORMAL
Lab: NORMAL
N GONORRHOEA RRNA SPEC QL NAA+PROBE: NOT DETECTED
PATH INTERP SPEC-IMP: NORMAL
STAT OF ADQ CVX/VAG CYTO-IMP: NORMAL
TRICHOMONAS VAGINALIS PCR: NOT DETECTED

## 2025-05-07 ENCOUNTER — RESULTS FOLLOW-UP (OUTPATIENT)
Age: 35
End: 2025-05-07

## 2025-05-07 NOTE — LETTER
Corrine Conte  [address] [facility] [id number]          June 9, 2025           Dear Ms. Conte:            This letter is to inform you that your most recent mammogram was normal. Please make sure you continue annual mammograms.  If you have any questions please do not hesitate to call our office. Thank you for choosing [facility] to be a part of your medical care team.            Sincerely,        NAYAN Latham

## 2025-05-22 LAB
NCCN CRITERIA FLAG: NORMAL
TYRER CUZICK SCORE: 11.5

## 2025-06-06 ENCOUNTER — HOSPITAL ENCOUNTER (OUTPATIENT)
Dept: MAMMOGRAPHY | Facility: HOSPITAL | Age: 35
Discharge: HOME | End: 2025-06-06
Admitting: NURSE PRACTITIONER
Payer: COMMERCIAL

## 2025-06-06 DIAGNOSIS — Z12.31 SCREENING MAMMOGRAM, ENCOUNTER FOR: ICD-10-CM

## 2025-06-06 PROCEDURE — 77063 BREAST TOMOSYNTHESIS BI: CPT

## 2025-06-06 PROCEDURE — 77067 SCR MAMMO BI INCL CAD: CPT

## (undated) DEVICE — SENSR O2 OXIMAX FNGR A/ 18IN NONSTR

## (undated) DEVICE — ENDOGATOR AUXILIARY WATER JET CONNECTOR: Brand: ENDOGATOR

## (undated) DEVICE — MSK O2 MD CONCENTR A/ LF 7FT 1P/U

## (undated) DEVICE — THE CHANNEL CLEANING BRUSH IS A NYLON FLEXI BRUSH ATTACHED TO A FLEXIBLE PLASTIC SHEATH DESIGNED TO SAFELY REMOVE DEBRIS FROM FLEXIBLE ENDOSCOPES.

## (undated) DEVICE — TBG SMPL FLTR LINE NASL 02/C02 A/ BX/100

## (undated) DEVICE — Device: Brand: DEFENDO AIR/WATER/SUCTION AND BIOPSY VALVE